# Patient Record
Sex: FEMALE | Race: WHITE | NOT HISPANIC OR LATINO | URBAN - METROPOLITAN AREA
[De-identification: names, ages, dates, MRNs, and addresses within clinical notes are randomized per-mention and may not be internally consistent; named-entity substitution may affect disease eponyms.]

---

## 2017-10-30 ENCOUNTER — EMERGENCY (EMERGENCY)
Facility: HOSPITAL | Age: 27
LOS: 1 days | Discharge: HOME | End: 2017-10-30
Attending: EMERGENCY MEDICINE
Payer: COMMERCIAL

## 2017-10-30 VITALS
SYSTOLIC BLOOD PRESSURE: 126 MMHG | WEIGHT: 179.9 LBS | RESPIRATION RATE: 18 BRPM | HEIGHT: 68 IN | DIASTOLIC BLOOD PRESSURE: 78 MMHG | OXYGEN SATURATION: 99 % | TEMPERATURE: 98 F | HEART RATE: 93 BPM

## 2017-10-30 PROCEDURE — 73590 X-RAY EXAM OF LOWER LEG: CPT | Mod: 26,RT

## 2017-10-30 PROCEDURE — 99283 EMERGENCY DEPT VISIT LOW MDM: CPT

## 2017-10-30 PROCEDURE — 99284 EMERGENCY DEPT VISIT MOD MDM: CPT | Mod: 25

## 2017-10-30 PROCEDURE — 73564 X-RAY EXAM KNEE 4 OR MORE: CPT

## 2017-10-30 PROCEDURE — 73590 X-RAY EXAM OF LOWER LEG: CPT

## 2017-10-30 PROCEDURE — 73564 X-RAY EXAM KNEE 4 OR MORE: CPT | Mod: 26,RT

## 2017-10-30 RX ORDER — IBUPROFEN 200 MG
600 TABLET ORAL ONCE
Qty: 0 | Refills: 0 | Status: COMPLETED | OUTPATIENT
Start: 2017-10-30 | End: 2017-10-30

## 2017-10-30 RX ADMIN — Medication 600 MILLIGRAM(S): at 14:37

## 2017-10-30 NOTE — ED STATDOCS - PROGRESS NOTE DETAILS
Pt is a 28 y/o female c/o of right knee pain for the past week. Agreed with HPI/ROS/PE/Orders from intake  General: Well appearing, in no distress, sitting comfortably Cardio: Regular rate and rhythm, S1/S2, no murmurs Resp: Clear to auscultation b/l, vesicular breath sounds, no wheezes, rales or rhonchi MSK: Tenderness over right knee (medial), crepitus with flexion, FROM in all extremities, able to straight leg raise, neurovascularly intact, muscle strength 5/5, ambulating without difficulty   Follow up with plan from intake X-ray of right knee: No evidence of fracture, MRI suggested for patient to follow up with, will give patient referral for ortho to follow up, pt is explained results and discharge instructions, pt verbalizes understanding results and discharge instructions

## 2017-10-30 NOTE — ED STATDOCS - OBJECTIVE STATEMENT
28 y/o F presents to ED c/o R knee pain x1 week. Pt states the pain has been worsening over the last week and she hears a "cracking" sound. She notes she is no longer able to bear weight on the RLE which prompted her visit to the ED today. She reports she has not seen any provider for her current sx. Denies recent trauma/injury, numbness, tingling, weakness or any other complaints at this time.

## 2017-10-30 NOTE — ED STATDOCS - MUSCULOSKELETAL, MLM
L knee: FROM of the ankle, knee and hip. No TTP of tibia, patella and femur. Full extension of the knee against gravity. No ligamentous laxity. R knee: Medial joint line tenderness. TTP of the proximal tibia. Positive crepitus over the R patellar with flexion. No spinal TTP. L knee: FROM of the ankle, knee and hip. No TTP of tibia, patella and femur. Full extension of the knee against gravity. No ligamentous laxity. R knee: Medial joint line tenderness. TTP of the proximal tibia. No ligamentous laxity. Positive crepitus over the R patellar with flexion. No spinal TTP.

## 2017-10-30 NOTE — ED STATDOCS - ATTENDING CONTRIBUTION TO CARE
I, Diana Raphael, performed the initial face to face bedside interview with this patient regarding history of present illness, review of symptoms and relevant past medical, social and family history.  I completed an independent physical examination.  I was the initial provider who evaluated this patient. I have signed out the follow up of any pending tests (i.e. labs, radiological studies) to the ACP.  I have communicated the patient’s plan of care and disposition with the ACP.  The history, relevant review of systems, past medical and surgical history, medical decision making, and physical examination was documented by the scribe in my presence and I attest to the accuracy of the documentation.

## 2017-10-30 NOTE — ED STATDOCS - CARDIAC, MLM
Heart is regular. No murmurs, rubs or gallops. Heart is regular. No murmurs, rubs or gallops. 2+ DP pulses to RLE

## 2017-10-30 NOTE — ED ADULT TRIAGE NOTE - CHIEF COMPLAINT QUOTE
patient arrived ambulatory to ED, awake, alert, and oriented times 3 ,breathing unlabored.  Patient states " my right knee has been cracking for 1 month"  Patient states today pain and " cracking" have been worse.

## 2017-11-01 ENCOUNTER — APPOINTMENT (OUTPATIENT)
Dept: ORTHOPEDIC SURGERY | Facility: CLINIC | Age: 27
End: 2017-11-01
Payer: MEDICAID

## 2017-11-01 VITALS
DIASTOLIC BLOOD PRESSURE: 84 MMHG | BODY MASS INDEX: 27.28 KG/M2 | WEIGHT: 180 LBS | HEIGHT: 68 IN | SYSTOLIC BLOOD PRESSURE: 129 MMHG | HEART RATE: 120 BPM

## 2017-11-01 DIAGNOSIS — Z78.9 OTHER SPECIFIED HEALTH STATUS: ICD-10-CM

## 2017-11-01 PROCEDURE — 99203 OFFICE O/P NEW LOW 30 MIN: CPT

## 2017-11-04 ENCOUNTER — FORM ENCOUNTER (OUTPATIENT)
Age: 27
End: 2017-11-04

## 2017-11-05 ENCOUNTER — APPOINTMENT (OUTPATIENT)
Dept: MRI IMAGING | Facility: CLINIC | Age: 27
End: 2017-11-05
Payer: MEDICAID

## 2017-11-05 ENCOUNTER — OUTPATIENT (OUTPATIENT)
Dept: OUTPATIENT SERVICES | Facility: HOSPITAL | Age: 27
LOS: 1 days | End: 2017-11-05
Payer: MEDICAID

## 2017-11-05 DIAGNOSIS — M23.91 UNSPECIFIED INTERNAL DERANGEMENT OF RIGHT KNEE: ICD-10-CM

## 2017-11-05 PROCEDURE — 73721 MRI JNT OF LWR EXTRE W/O DYE: CPT

## 2017-11-05 PROCEDURE — 73721 MRI JNT OF LWR EXTRE W/O DYE: CPT | Mod: 26,RT

## 2017-11-09 ENCOUNTER — APPOINTMENT (OUTPATIENT)
Dept: ORTHOPEDIC SURGERY | Facility: CLINIC | Age: 27
End: 2017-11-09
Payer: MEDICAID

## 2017-11-09 VITALS
HEIGHT: 68 IN | DIASTOLIC BLOOD PRESSURE: 71 MMHG | SYSTOLIC BLOOD PRESSURE: 113 MMHG | BODY MASS INDEX: 27.28 KG/M2 | HEART RATE: 84 BPM | WEIGHT: 180 LBS

## 2017-11-09 PROCEDURE — 99213 OFFICE O/P EST LOW 20 MIN: CPT

## 2018-02-01 ENCOUNTER — OUTPATIENT (OUTPATIENT)
Dept: OUTPATIENT SERVICES | Facility: HOSPITAL | Age: 28
LOS: 1 days | End: 2018-02-01
Payer: MEDICAID

## 2018-02-01 PROCEDURE — G9001: CPT

## 2018-02-21 ENCOUNTER — EMERGENCY (EMERGENCY)
Facility: HOSPITAL | Age: 28
LOS: 1 days | Discharge: HOME | End: 2018-02-21
Attending: EMERGENCY MEDICINE | Admitting: EMERGENCY MEDICINE
Payer: COMMERCIAL

## 2018-02-21 VITALS — WEIGHT: 179.9 LBS | HEIGHT: 68 IN

## 2018-02-21 VITALS
HEART RATE: 100 BPM | RESPIRATION RATE: 20 BRPM | DIASTOLIC BLOOD PRESSURE: 66 MMHG | TEMPERATURE: 99 F | SYSTOLIC BLOOD PRESSURE: 132 MMHG | OXYGEN SATURATION: 99 %

## 2018-02-21 PROCEDURE — 99283 EMERGENCY DEPT VISIT LOW MDM: CPT

## 2018-02-21 NOTE — ED STATDOCS - ATTENDING CONTRIBUTION TO CARE
seen with acp  c/o aches pains weakness postive fever  PE is unremarkble  will start on tamiflu  Agree with acps assessment hx and physical

## 2018-02-21 NOTE — ED STATDOCS - PHYSICAL EXAMINATION
PE: General: NAD. Eyes: PERRLA, EOM intact.  CV: RRR, no m/r/g. Lungs: CTA b/l, no use of accessory muscles, no wheezes, rales, rhonchi. Skin: warm, dry, no rashes. Psych: normal mood/affect. Abdomen: Normal BS, soft, NT/ND. . Musculoskeletal: good strength b/l UE/LE, normal Watson.  Neuro: no focal deficits

## 2018-02-21 NOTE — ED STATDOCS - OBJECTIVE STATEMENT
27 y.o F with no PMH on no medications presents to ED complaining of fever, fatigue, muscle aches, cough x 1 day. Pt admits to being around sick contacts, younger sister with positive flu. Pt did not get flu vaccine for this year. Pt denies other medical illnesses.. denies headaches, SOB, chest pain, abdomina pain. Pt able to tolerate PO.

## 2018-02-21 NOTE — ED STATDOCS - MEDICAL DECISION MAKING DETAILS
Given symptoms, recent sick contacts, pt likely with flu. Pt tolerating PO. Will DC home with tamiflu. Supportive tx.

## 2018-02-21 NOTE — ED ADULT NURSE NOTE - OBJECTIVE STATEMENT
pt presents to ED with nasal congestion, body aches, cough and fever since yesterday. pt states she vomited one time. denies diarrhea. a&ox3. breahitng si even and unlabored

## 2018-02-23 DIAGNOSIS — R69 ILLNESS, UNSPECIFIED: ICD-10-CM

## 2018-07-23 ENCOUNTER — EMERGENCY (EMERGENCY)
Facility: HOSPITAL | Age: 28
LOS: 1 days | Discharge: HOME | End: 2018-07-23
Attending: EMERGENCY MEDICINE
Payer: SELF-PAY

## 2018-07-23 VITALS
RESPIRATION RATE: 18 BRPM | DIASTOLIC BLOOD PRESSURE: 79 MMHG | HEIGHT: 67 IN | HEART RATE: 83 BPM | WEIGHT: 179.9 LBS | OXYGEN SATURATION: 99 % | SYSTOLIC BLOOD PRESSURE: 125 MMHG | TEMPERATURE: 99 F

## 2018-07-23 LAB
ALBUMIN SERPL ELPH-MCNC: 3.3 G/DL — SIGNIFICANT CHANGE UP (ref 3.3–5.2)
ALP SERPL-CCNC: 81 U/L — SIGNIFICANT CHANGE UP (ref 40–120)
ALT FLD-CCNC: 21 U/L — SIGNIFICANT CHANGE UP
ANION GAP SERPL CALC-SCNC: 12 MMOL/L — SIGNIFICANT CHANGE UP (ref 5–17)
APPEARANCE UR: CLEAR — SIGNIFICANT CHANGE UP
APTT BLD: 30.3 SEC — SIGNIFICANT CHANGE UP (ref 27.5–37.4)
AST SERPL-CCNC: 16 U/L — SIGNIFICANT CHANGE UP
BACTERIA # UR AUTO: ABNORMAL
BASOPHILS # BLD AUTO: 0 K/UL — SIGNIFICANT CHANGE UP (ref 0–0.2)
BASOPHILS NFR BLD AUTO: 0.2 % — SIGNIFICANT CHANGE UP (ref 0–2)
BILIRUB SERPL-MCNC: 0.3 MG/DL — LOW (ref 0.4–2)
BILIRUB UR-MCNC: NEGATIVE — SIGNIFICANT CHANGE UP
BLD GP AB SCN SERPL QL: SIGNIFICANT CHANGE UP
BUN SERPL-MCNC: 10 MG/DL — SIGNIFICANT CHANGE UP (ref 8–20)
CALCIUM SERPL-MCNC: 9.4 MG/DL — SIGNIFICANT CHANGE UP (ref 8.6–10.2)
CHLORIDE SERPL-SCNC: 104 MMOL/L — SIGNIFICANT CHANGE UP (ref 98–107)
CO2 SERPL-SCNC: 24 MMOL/L — SIGNIFICANT CHANGE UP (ref 22–29)
COLOR SPEC: YELLOW — SIGNIFICANT CHANGE UP
CREAT SERPL-MCNC: 0.25 MG/DL — LOW (ref 0.5–1.3)
DIFF PNL FLD: ABNORMAL
EOSINOPHIL # BLD AUTO: 0.2 K/UL — SIGNIFICANT CHANGE UP (ref 0–0.5)
EOSINOPHIL NFR BLD AUTO: 3.9 % — SIGNIFICANT CHANGE UP (ref 0–6)
EPI CELLS # UR: SIGNIFICANT CHANGE UP
GLUCOSE SERPL-MCNC: 91 MG/DL — SIGNIFICANT CHANGE UP (ref 70–115)
GLUCOSE UR QL: NEGATIVE MG/DL — SIGNIFICANT CHANGE UP
HCG UR QL: NEGATIVE — SIGNIFICANT CHANGE UP
HCT VFR BLD CALC: 35.9 % — LOW (ref 37–47)
HGB BLD-MCNC: 11.7 G/DL — LOW (ref 12–16)
INR BLD: 1.09 RATIO — SIGNIFICANT CHANGE UP (ref 0.88–1.16)
KETONES UR-MCNC: NEGATIVE — SIGNIFICANT CHANGE UP
LEUKOCYTE ESTERASE UR-ACNC: NEGATIVE — SIGNIFICANT CHANGE UP
LYMPHOCYTES # BLD AUTO: 2.6 K/UL — SIGNIFICANT CHANGE UP (ref 1–4.8)
LYMPHOCYTES # BLD AUTO: 45 % — SIGNIFICANT CHANGE UP (ref 20–55)
MCHC RBC-ENTMCNC: 26.7 PG — LOW (ref 27–31)
MCHC RBC-ENTMCNC: 32.6 G/DL — SIGNIFICANT CHANGE UP (ref 32–36)
MCV RBC AUTO: 81.8 FL — SIGNIFICANT CHANGE UP (ref 81–99)
MONOCYTES # BLD AUTO: 0.5 K/UL — SIGNIFICANT CHANGE UP (ref 0–0.8)
MONOCYTES NFR BLD AUTO: 9.5 % — SIGNIFICANT CHANGE UP (ref 3–10)
NEUTROPHILS # BLD AUTO: 2.3 K/UL — SIGNIFICANT CHANGE UP (ref 1.8–8)
NEUTROPHILS NFR BLD AUTO: 41.2 % — SIGNIFICANT CHANGE UP (ref 37–73)
NITRITE UR-MCNC: NEGATIVE — SIGNIFICANT CHANGE UP
PH UR: 5 — SIGNIFICANT CHANGE UP (ref 5–8)
PLATELET # BLD AUTO: 237 K/UL — SIGNIFICANT CHANGE UP (ref 150–400)
POTASSIUM SERPL-MCNC: 4.3 MMOL/L — SIGNIFICANT CHANGE UP (ref 3.5–5.3)
POTASSIUM SERPL-SCNC: 4.3 MMOL/L — SIGNIFICANT CHANGE UP (ref 3.5–5.3)
PROT SERPL-MCNC: 6.6 G/DL — SIGNIFICANT CHANGE UP (ref 6.6–8.7)
PROT UR-MCNC: NEGATIVE MG/DL — SIGNIFICANT CHANGE UP
PROTHROM AB SERPL-ACNC: 12 SEC — SIGNIFICANT CHANGE UP (ref 9.8–12.7)
RBC # BLD: 4.39 M/UL — LOW (ref 4.4–5.2)
RBC # FLD: 14.4 % — SIGNIFICANT CHANGE UP (ref 11–15.6)
RBC CASTS # UR COMP ASSIST: ABNORMAL /HPF (ref 0–4)
SODIUM SERPL-SCNC: 140 MMOL/L — SIGNIFICANT CHANGE UP (ref 135–145)
SP GR SPEC: 1.01 — SIGNIFICANT CHANGE UP (ref 1.01–1.02)
TYPE + AB SCN PNL BLD: SIGNIFICANT CHANGE UP
UROBILINOGEN FLD QL: NEGATIVE MG/DL — SIGNIFICANT CHANGE UP
WBC # BLD: 5.7 K/UL — SIGNIFICANT CHANGE UP (ref 4.8–10.8)
WBC # FLD AUTO: 5.7 K/UL — SIGNIFICANT CHANGE UP (ref 4.8–10.8)
WBC UR QL: SIGNIFICANT CHANGE UP

## 2018-07-23 PROCEDURE — 80053 COMPREHEN METABOLIC PANEL: CPT

## 2018-07-23 PROCEDURE — 76856 US EXAM PELVIC COMPLETE: CPT

## 2018-07-23 PROCEDURE — 84702 CHORIONIC GONADOTROPIN TEST: CPT

## 2018-07-23 PROCEDURE — 76830 TRANSVAGINAL US NON-OB: CPT

## 2018-07-23 PROCEDURE — 85027 COMPLETE CBC AUTOMATED: CPT

## 2018-07-23 PROCEDURE — 85610 PROTHROMBIN TIME: CPT

## 2018-07-23 PROCEDURE — 76830 TRANSVAGINAL US NON-OB: CPT | Mod: 26

## 2018-07-23 PROCEDURE — 86850 RBC ANTIBODY SCREEN: CPT

## 2018-07-23 PROCEDURE — 81001 URINALYSIS AUTO W/SCOPE: CPT

## 2018-07-23 PROCEDURE — 99284 EMERGENCY DEPT VISIT MOD MDM: CPT

## 2018-07-23 PROCEDURE — 36415 COLL VENOUS BLD VENIPUNCTURE: CPT

## 2018-07-23 PROCEDURE — 86900 BLOOD TYPING SEROLOGIC ABO: CPT

## 2018-07-23 PROCEDURE — 85730 THROMBOPLASTIN TIME PARTIAL: CPT

## 2018-07-23 PROCEDURE — 87086 URINE CULTURE/COLONY COUNT: CPT

## 2018-07-23 PROCEDURE — 86901 BLOOD TYPING SEROLOGIC RH(D): CPT

## 2018-07-23 PROCEDURE — 81025 URINE PREGNANCY TEST: CPT

## 2018-07-23 PROCEDURE — 76856 US EXAM PELVIC COMPLETE: CPT | Mod: 26

## 2018-07-23 PROCEDURE — 99284 EMERGENCY DEPT VISIT MOD MDM: CPT | Mod: 25

## 2018-07-23 NOTE — ED STATDOCS - OBJECTIVE STATEMENT
This is a 27 year old female with c/o vaginal bleeding during pregnancy x 1 day.  She notes h/o 4 times being pregnant.  1 Living, 2 miscarriages medically done, and 1 .  She notes took home pregnancy test friday and was positive.  She notes bleeding intensified today.  She notes has not had any prenatal care thus far, due to lack of insurance, in past used [facility].

## 2018-07-23 NOTE — ED STATDOCS - CARE PLAN
Principal Discharge DX:	Threatened  Principal Discharge DX:	DUB (dysfunctional uterine bleeding) Principal Discharge DX:	DUB (dysfunctional uterine bleeding)  Secondary Diagnosis:	Ovarian cyst

## 2018-07-23 NOTE — ED ADULT TRIAGE NOTE - CHIEF COMPLAINT QUOTE
"I took a pregnancy test and it was positive now I am bleeding and I have lower abdominal cramping. " Pt states LMP was 6/14/2018

## 2018-07-23 NOTE — ED STATDOCS - PROGRESS NOTE DETAILS
labs and UA reviewed, patient is not pregnant, pending US, patient reports h/o ovarian cyst patient wanted to leave prior to obtaining US results, US shows cyst, needs f/u with GYN, LM with home phone number labs and UA reviewed, patient is not pregnant, pending US, patient reports h/o ovarian cyst, patient deferred pelvic exam spoke to patient given results of US to f/u with GYN, patient understands and agrees to proceed

## 2018-07-23 NOTE — ED ADULT NURSE NOTE - OBJECTIVE STATEMENT
Patient arrived to ED with c/o vaginal bleeding, cramping to lower abdomen.  Patient states she is about 1 month pregnant.

## 2018-07-23 NOTE — ED STATDOCS - ATTENDING CONTRIBUTION TO CARE
case d/w acp:  postive hpt with light vaginal bleeding since yesterday which has increased in intensity.  mild lower abd cramps reported.  nontoxic appaeirn,with no abd tendnerss reported on exam.  labs and sono resutls reviewed; suggested of dysfunctional uterine bleeding.

## 2018-07-24 LAB
CULTURE RESULTS: NO GROWTH — SIGNIFICANT CHANGE UP
SPECIMEN SOURCE: SIGNIFICANT CHANGE UP

## 2018-09-01 ENCOUNTER — OUTPATIENT (OUTPATIENT)
Dept: OUTPATIENT SERVICES | Facility: HOSPITAL | Age: 28
LOS: 1 days | End: 2018-09-01
Payer: MEDICAID

## 2018-09-01 PROCEDURE — G9001: CPT

## 2018-09-09 ENCOUNTER — EMERGENCY (EMERGENCY)
Facility: HOSPITAL | Age: 28
LOS: 1 days | Discharge: HOME | End: 2018-09-09
Attending: EMERGENCY MEDICINE
Payer: MEDICAID

## 2018-09-09 VITALS — HEIGHT: 68 IN | WEIGHT: 190.04 LBS

## 2018-09-09 VITALS — HEART RATE: 103 BPM | TEMPERATURE: 100 F | OXYGEN SATURATION: 99 % | RESPIRATION RATE: 18 BRPM

## 2018-09-09 LAB
APPEARANCE UR: CLEAR — SIGNIFICANT CHANGE UP
BILIRUB UR-MCNC: NEGATIVE — SIGNIFICANT CHANGE UP
COLOR SPEC: YELLOW — SIGNIFICANT CHANGE UP
DIFF PNL FLD: ABNORMAL
EPI CELLS # UR: SIGNIFICANT CHANGE UP
GLUCOSE UR QL: NEGATIVE MG/DL — SIGNIFICANT CHANGE UP
HCG UR QL: NEGATIVE — SIGNIFICANT CHANGE UP
KETONES UR-MCNC: NEGATIVE — SIGNIFICANT CHANGE UP
LEUKOCYTE ESTERASE UR-ACNC: ABNORMAL
NITRITE UR-MCNC: NEGATIVE — SIGNIFICANT CHANGE UP
PH UR: 6 — SIGNIFICANT CHANGE UP (ref 5–8)
PROT UR-MCNC: 30 MG/DL
RBC CASTS # UR COMP ASSIST: SIGNIFICANT CHANGE UP /HPF (ref 0–4)
SP GR SPEC: 1.02 — SIGNIFICANT CHANGE UP (ref 1.01–1.02)
UROBILINOGEN FLD QL: 1 MG/DL
WBC UR QL: SIGNIFICANT CHANGE UP

## 2018-09-09 PROCEDURE — 81025 URINE PREGNANCY TEST: CPT

## 2018-09-09 PROCEDURE — 99283 EMERGENCY DEPT VISIT LOW MDM: CPT | Mod: 25

## 2018-09-09 PROCEDURE — 96372 THER/PROPH/DIAG INJ SC/IM: CPT

## 2018-09-09 PROCEDURE — 99283 EMERGENCY DEPT VISIT LOW MDM: CPT

## 2018-09-09 PROCEDURE — 87070 CULTURE OTHR SPECIMN AEROBIC: CPT

## 2018-09-09 PROCEDURE — 81001 URINALYSIS AUTO W/SCOPE: CPT

## 2018-09-09 RX ORDER — AMOXICILLIN 250 MG/5ML
1 SUSPENSION, RECONSTITUTED, ORAL (ML) ORAL
Qty: 30 | Refills: 0 | OUTPATIENT
Start: 2018-09-09 | End: 2018-09-18

## 2018-09-09 RX ORDER — KETOROLAC TROMETHAMINE 30 MG/ML
60 SYRINGE (ML) INJECTION ONCE
Qty: 0 | Refills: 0 | Status: DISCONTINUED | OUTPATIENT
Start: 2018-09-09 | End: 2018-09-09

## 2018-09-09 RX ORDER — AMOXICILLIN 250 MG/5ML
500 SUSPENSION, RECONSTITUTED, ORAL (ML) ORAL ONCE
Qty: 0 | Refills: 0 | Status: COMPLETED | OUTPATIENT
Start: 2018-09-09 | End: 2018-09-09

## 2018-09-09 RX ORDER — ACETAMINOPHEN 500 MG
650 TABLET ORAL ONCE
Qty: 0 | Refills: 0 | Status: COMPLETED | OUTPATIENT
Start: 2018-09-09 | End: 2018-09-09

## 2018-09-09 RX ADMIN — Medication 650 MILLIGRAM(S): at 18:01

## 2018-09-09 RX ADMIN — Medication 500 MILLIGRAM(S): at 20:18

## 2018-09-09 RX ADMIN — Medication 60 MILLIGRAM(S): at 20:18

## 2018-09-09 NOTE — ED STATDOCS - OBJECTIVE STATEMENT
This patient is a 29 y/o F presenting to the ED with fever (105), congestion, body aches, and sore throat which onset 3 days ago. Pt notes her son was also sick; he had a fever and abdominal pain. Pt has been taking alternating Tylenol and Motrin as her son did but has had minimal relief. She took Ampicillin today at 1600 but says she was not prescribed it. Pt also says she has pain with swallowing at times. She traveled to [place] recently but does not recall having any tick bites. She is a smoker and denies cough or ear pain. This patient is a 29 y/o woman presenting to the ED with fever (105), congestion, body aches, and sore throat which onset 3 days ago. Pt notes her son was also sick with fever and abdominal pain. Pt has been taking alternating Tylenol and Motrin with minimal relief. She took Ampicillin today at 1600 but it was a left over pill from another family members prescription.  She denies recent travel, tick bites, abdominal pain, vomiting, dysuria, and hematuria.

## 2018-09-09 NOTE — ED ADULT NURSE NOTE - NSIMPLEMENTINTERV_GEN_ALL_ED
Implemented All Universal Safety Interventions:  [place] to call system. Call bell, personal items and telephone within reach. Instruct patient to call for assistance. Room bathroom lighting operational. Non-slip footwear when patient is off stretcher. Physically safe environment: no spills, clutter or unnecessary equipment. Stretcher in lowest position, wheels locked, appropriate side rails in place.

## 2018-09-09 NOTE — ED ADULT TRIAGE NOTE - CHIEF COMPLAINT QUOTE
Patient is awake and oriented times 3, complains of a fever and sore throat, patient states that her son has similar symptoms

## 2018-09-09 NOTE — ED STATDOCS - MEDICAL DECISION MAKING DETAILS
27 y/o F presents with sore throat and nasal congestion - erythematous tonsils on exam - reporting high temp: Will treat for strep pharyngitis. Pt encouraged to stop smoking and stay hydrate. Will instruct pt to take Motrin & Tylenol for pain and f/u with PMD.

## 2018-09-09 NOTE — ED STATDOCS - ENMT, MLM
Effusion to right ear, erythematous. Swollen and erythematous tonsils, no exudates. Post nasal drip on exam

## 2018-09-11 ENCOUNTER — INPATIENT (INPATIENT)
Facility: HOSPITAL | Age: 28
LOS: 1 days | Discharge: HOME | DRG: 75 | End: 2018-09-13
Attending: HOSPITALIST | Admitting: HOSPITALIST
Payer: MEDICAID

## 2018-09-11 VITALS — HEIGHT: 68 IN | WEIGHT: 190.04 LBS

## 2018-09-11 LAB
ALBUMIN SERPL ELPH-MCNC: 3.4 G/DL — SIGNIFICANT CHANGE UP (ref 3.3–5.2)
ALP SERPL-CCNC: 96 U/L — SIGNIFICANT CHANGE UP (ref 40–120)
ALT FLD-CCNC: 32 U/L — SIGNIFICANT CHANGE UP
AMPHET UR-MCNC: NEGATIVE — SIGNIFICANT CHANGE UP
ANION GAP SERPL CALC-SCNC: 14 MMOL/L — SIGNIFICANT CHANGE UP (ref 5–17)
APPEARANCE CSF: CLEAR — SIGNIFICANT CHANGE UP
APPEARANCE UR: CLEAR — SIGNIFICANT CHANGE UP
APTT BLD: 30.5 SEC — SIGNIFICANT CHANGE UP (ref 27.5–37.4)
AST SERPL-CCNC: 33 U/L — HIGH
BACTERIA # UR AUTO: ABNORMAL
BARBITURATES UR SCN-MCNC: NEGATIVE — SIGNIFICANT CHANGE UP
BENZODIAZ UR-MCNC: NEGATIVE — SIGNIFICANT CHANGE UP
BILIRUB SERPL-MCNC: 0.2 MG/DL — LOW (ref 0.4–2)
BILIRUB UR-MCNC: NEGATIVE — SIGNIFICANT CHANGE UP
BLD GP AB SCN SERPL QL: SIGNIFICANT CHANGE UP
BUN SERPL-MCNC: 9 MG/DL — SIGNIFICANT CHANGE UP (ref 8–20)
CALCIUM SERPL-MCNC: 9 MG/DL — SIGNIFICANT CHANGE UP (ref 8.6–10.2)
CHLORIDE SERPL-SCNC: 103 MMOL/L — SIGNIFICANT CHANGE UP (ref 98–107)
CO2 SERPL-SCNC: 21 MMOL/L — LOW (ref 22–29)
COCAINE METAB.OTHER UR-MCNC: NEGATIVE — SIGNIFICANT CHANGE UP
COLOR CSF: SIGNIFICANT CHANGE UP
COLOR SPEC: YELLOW — SIGNIFICANT CHANGE UP
CREAT SERPL-MCNC: 0.28 MG/DL — LOW (ref 0.5–1.3)
CULTURE RESULTS: SIGNIFICANT CHANGE UP
DIFF PNL FLD: ABNORMAL
EOSINOPHIL # BLD AUTO: 0 K/UL — SIGNIFICANT CHANGE UP (ref 0–0.5)
EOSINOPHIL NFR BLD AUTO: 0.1 % — SIGNIFICANT CHANGE UP (ref 0–6)
EPI CELLS # UR: SIGNIFICANT CHANGE UP
GLUCOSE CSF-MCNC: 52 MG/DL — SIGNIFICANT CHANGE UP (ref 40–70)
GLUCOSE SERPL-MCNC: 93 MG/DL — SIGNIFICANT CHANGE UP (ref 70–115)
GLUCOSE UR QL: NEGATIVE MG/DL — SIGNIFICANT CHANGE UP
GRAM STN FLD: SIGNIFICANT CHANGE UP
HCG UR QL: NEGATIVE — SIGNIFICANT CHANGE UP
HCT VFR BLD CALC: 36.6 % — LOW (ref 37–47)
HETEROPH AB TITR SER AGGL: NEGATIVE — SIGNIFICANT CHANGE UP
HGB BLD-MCNC: 12.2 G/DL — SIGNIFICANT CHANGE UP (ref 12–16)
INR BLD: 1.22 RATIO — HIGH (ref 0.88–1.16)
KETONES UR-MCNC: ABNORMAL
LEUKOCYTE ESTERASE UR-ACNC: NEGATIVE — SIGNIFICANT CHANGE UP
LYMPHOCYTES # BLD AUTO: 1.6 K/UL — SIGNIFICANT CHANGE UP (ref 1–4.8)
LYMPHOCYTES # BLD AUTO: 18.5 % — LOW (ref 20–55)
LYMPHOCYTES # CSF: 6 % — LOW (ref 40–80)
MCHC RBC-ENTMCNC: 26.8 PG — LOW (ref 27–31)
MCHC RBC-ENTMCNC: 33.3 G/DL — SIGNIFICANT CHANGE UP (ref 32–36)
MCV RBC AUTO: 80.3 FL — LOW (ref 81–99)
METHADONE UR-MCNC: NEGATIVE — SIGNIFICANT CHANGE UP
MONOCYTES # BLD AUTO: 0.4 K/UL — SIGNIFICANT CHANGE UP (ref 0–0.8)
MONOCYTES NFR BLD AUTO: 4.3 % — SIGNIFICANT CHANGE UP (ref 3–10)
MONOS+MACROS NFR CSF: 4 % — SIGNIFICANT CHANGE UP
NEUTROPHILS # BLD AUTO: 6.6 K/UL — SIGNIFICANT CHANGE UP (ref 1.8–8)
NEUTROPHILS # CSF: 90 % — SIGNIFICANT CHANGE UP
NEUTROPHILS NFR BLD AUTO: 77 % — HIGH (ref 37–73)
NITRITE UR-MCNC: NEGATIVE — SIGNIFICANT CHANGE UP
NRBC NFR CSF: 484 /UL — HIGH (ref 0–5)
OPIATES UR-MCNC: NEGATIVE — SIGNIFICANT CHANGE UP
PCP SPEC-MCNC: SIGNIFICANT CHANGE UP
PCP UR-MCNC: NEGATIVE — SIGNIFICANT CHANGE UP
PH UR: 5 — SIGNIFICANT CHANGE UP (ref 5–8)
PLATELET # BLD AUTO: 204 K/UL — SIGNIFICANT CHANGE UP (ref 150–400)
POTASSIUM SERPL-MCNC: 4 MMOL/L — SIGNIFICANT CHANGE UP (ref 3.5–5.3)
POTASSIUM SERPL-SCNC: 4 MMOL/L — SIGNIFICANT CHANGE UP (ref 3.5–5.3)
PROT CSF-MCNC: 30 MG/DL — SIGNIFICANT CHANGE UP (ref 15–45)
PROT SERPL-MCNC: 7 G/DL — SIGNIFICANT CHANGE UP (ref 6.6–8.7)
PROT UR-MCNC: NEGATIVE MG/DL — SIGNIFICANT CHANGE UP
PROTHROM AB SERPL-ACNC: 13.5 SEC — HIGH (ref 9.8–12.7)
RBC # BLD: 4.56 M/UL — SIGNIFICANT CHANGE UP (ref 4.4–5.2)
RBC # CSF: 4 /CMM — HIGH (ref 0–1)
RBC # FLD: 12.7 % — SIGNIFICANT CHANGE UP (ref 11–15.6)
RBC CASTS # UR COMP ASSIST: SIGNIFICANT CHANGE UP /HPF (ref 0–4)
SODIUM SERPL-SCNC: 138 MMOL/L — SIGNIFICANT CHANGE UP (ref 135–145)
SP GR SPEC: 1.01 — SIGNIFICANT CHANGE UP (ref 1.01–1.02)
SPECIMEN SOURCE: SIGNIFICANT CHANGE UP
SPECIMEN SOURCE: SIGNIFICANT CHANGE UP
THC UR QL: POSITIVE
TUBE TYPE: SIGNIFICANT CHANGE UP
TYPE + AB SCN PNL BLD: SIGNIFICANT CHANGE UP
UROBILINOGEN FLD QL: NEGATIVE MG/DL — SIGNIFICANT CHANGE UP
WBC # BLD: 8.5 K/UL — SIGNIFICANT CHANGE UP (ref 4.8–10.8)
WBC # FLD AUTO: 8.5 K/UL — SIGNIFICANT CHANGE UP (ref 4.8–10.8)
WBC UR QL: SIGNIFICANT CHANGE UP

## 2018-09-11 PROCEDURE — 99285 EMERGENCY DEPT VISIT HI MDM: CPT | Mod: 25

## 2018-09-11 PROCEDURE — 62270 DX LMBR SPI PNXR: CPT

## 2018-09-11 PROCEDURE — 71045 X-RAY EXAM CHEST 1 VIEW: CPT | Mod: 26

## 2018-09-11 PROCEDURE — 70450 CT HEAD/BRAIN W/O DYE: CPT | Mod: 26

## 2018-09-11 PROCEDURE — 74177 CT ABD & PELVIS W/CONTRAST: CPT | Mod: 26

## 2018-09-11 RX ORDER — ACYCLOVIR SODIUM 500 MG
850 VIAL (EA) INTRAVENOUS EVERY 8 HOURS
Qty: 0 | Refills: 0 | Status: DISCONTINUED | OUTPATIENT
Start: 2018-09-11 | End: 2018-09-12

## 2018-09-11 RX ORDER — KETOROLAC TROMETHAMINE 30 MG/ML
30 SYRINGE (ML) INJECTION ONCE
Qty: 0 | Refills: 0 | Status: DISCONTINUED | OUTPATIENT
Start: 2018-09-11 | End: 2018-09-11

## 2018-09-11 RX ORDER — ACETAMINOPHEN 500 MG
1000 TABLET ORAL ONCE
Qty: 0 | Refills: 0 | Status: COMPLETED | OUTPATIENT
Start: 2018-09-11 | End: 2018-09-11

## 2018-09-11 RX ORDER — METOCLOPRAMIDE HCL 10 MG
10 TABLET ORAL ONCE
Qty: 0 | Refills: 0 | Status: COMPLETED | OUTPATIENT
Start: 2018-09-11 | End: 2018-09-11

## 2018-09-11 RX ORDER — SODIUM CHLORIDE 9 MG/ML
1000 INJECTION INTRAMUSCULAR; INTRAVENOUS; SUBCUTANEOUS ONCE
Qty: 0 | Refills: 0 | Status: COMPLETED | OUTPATIENT
Start: 2018-09-11 | End: 2018-09-11

## 2018-09-11 RX ADMIN — Medication 1000 MILLIGRAM(S): at 16:55

## 2018-09-11 RX ADMIN — Medication 10 MILLIGRAM(S): at 16:27

## 2018-09-11 RX ADMIN — SODIUM CHLORIDE 1000 MILLILITER(S): 9 INJECTION INTRAMUSCULAR; INTRAVENOUS; SUBCUTANEOUS at 16:27

## 2018-09-11 RX ADMIN — Medication 2 TABLET(S): at 22:23

## 2018-09-11 RX ADMIN — Medication 10 MILLIGRAM(S): at 14:49

## 2018-09-11 RX ADMIN — Medication 267 MILLIGRAM(S): at 23:43

## 2018-09-11 RX ADMIN — SODIUM CHLORIDE 1000 MILLILITER(S): 9 INJECTION INTRAMUSCULAR; INTRAVENOUS; SUBCUTANEOUS at 23:36

## 2018-09-11 RX ADMIN — Medication 2 TABLET(S): at 23:16

## 2018-09-11 RX ADMIN — SODIUM CHLORIDE 1000 MILLILITER(S): 9 INJECTION INTRAMUSCULAR; INTRAVENOUS; SUBCUTANEOUS at 22:23

## 2018-09-11 RX ADMIN — SODIUM CHLORIDE 1000 MILLILITER(S): 9 INJECTION INTRAMUSCULAR; INTRAVENOUS; SUBCUTANEOUS at 14:49

## 2018-09-11 RX ADMIN — Medication 30 MILLIGRAM(S): at 23:40

## 2018-09-11 RX ADMIN — Medication 400 MILLIGRAM(S): at 16:38

## 2018-09-11 NOTE — ED ADULT TRIAGE NOTE - CHIEF COMPLAINT QUOTE
"I have rash all over my body and I have a headache, I was diagnosed with pharyngitis. " Pt A & Ox. 4

## 2018-09-11 NOTE — ED STATDOCS - OBJECTIVE STATEMENT
Patient is a 28 year old F with a PMHx of anxiety and depression who presents to the ED complaining of headache x4-5 days and rash that started today.  States that she is having sensitivity to light and notes that the rash is on the chest and stomach.  Notes she has had an on/off fever for the past few days.  Denies any recent travel, or other medical complaints at this time.

## 2018-09-11 NOTE — ED STATDOCS - ATTENDING CONTRIBUTION TO CARE
I, Antolin Becker, performed the initial face to face bedside interview with this patient regarding history of present illness, review of symptoms and relevant past medical, social and family history.  I completed an independent physical examination.  I was the initial provider who evaluated this patient. I have signed out the follow up of any pending tests (i.e. labs, radiological studies) to the ACP.  I have communicated the patient’s plan of care and disposition with the ACP.

## 2018-09-11 NOTE — ED STATDOCS - CARE PLAN
Principal Discharge DX:	Headache  Secondary Diagnosis:	Rash  Secondary Diagnosis:	Fever Principal Discharge DX:	Viral meningitis  Secondary Diagnosis:	Rash  Secondary Diagnosis:	Fever

## 2018-09-12 DIAGNOSIS — Z71.89 OTHER SPECIFIED COUNSELING: ICD-10-CM

## 2018-09-12 DIAGNOSIS — A87.9 VIRAL MENINGITIS, UNSPECIFIED: ICD-10-CM

## 2018-09-12 LAB
ANION GAP SERPL CALC-SCNC: 11 MMOL/L — SIGNIFICANT CHANGE UP (ref 5–17)
ANISOCYTOSIS BLD QL: SLIGHT — SIGNIFICANT CHANGE UP
BASOPHILS # BLD AUTO: 0 K/UL — SIGNIFICANT CHANGE UP (ref 0–0.2)
BUN SERPL-MCNC: 8 MG/DL — SIGNIFICANT CHANGE UP (ref 8–20)
CALCIUM SERPL-MCNC: 8.6 MG/DL — SIGNIFICANT CHANGE UP (ref 8.6–10.2)
CHLORIDE SERPL-SCNC: 107 MMOL/L — SIGNIFICANT CHANGE UP (ref 98–107)
CO2 SERPL-SCNC: 21 MMOL/L — LOW (ref 22–29)
CREAT SERPL-MCNC: 0.23 MG/DL — LOW (ref 0.5–1.3)
CSF PCR RESULT: DETECTED
CULTURE RESULTS: NO GROWTH — SIGNIFICANT CHANGE UP
EOSINOPHIL # BLD AUTO: 0 K/UL — SIGNIFICANT CHANGE UP (ref 0–0.5)
EOSINOPHIL NFR BLD AUTO: 1 % — SIGNIFICANT CHANGE UP (ref 0–5)
EV RNA CSF QL NAA+PROBE: DETECTED
GLUCOSE SERPL-MCNC: 86 MG/DL — SIGNIFICANT CHANGE UP (ref 70–115)
HCT VFR BLD CALC: 32.1 % — LOW (ref 37–47)
HGB BLD-MCNC: 10.5 G/DL — LOW (ref 12–16)
HIV 1 & 2 AB SERPL IA.RAPID: SIGNIFICANT CHANGE UP
LACTATE BLDV-MCNC: 0.7 MMOL/L — SIGNIFICANT CHANGE UP (ref 0.5–2)
LYMPHOCYTES # BLD AUTO: 3 K/UL — SIGNIFICANT CHANGE UP (ref 1–4.8)
LYMPHOCYTES # BLD AUTO: 57 % — HIGH (ref 20–55)
MAGNESIUM SERPL-MCNC: 2 MG/DL — SIGNIFICANT CHANGE UP (ref 1.6–2.6)
MCHC RBC-ENTMCNC: 26.4 PG — LOW (ref 27–31)
MCHC RBC-ENTMCNC: 32.7 G/DL — SIGNIFICANT CHANGE UP (ref 32–36)
MCV RBC AUTO: 80.7 FL — LOW (ref 81–99)
MICROCYTES BLD QL: SLIGHT — SIGNIFICANT CHANGE UP
MONOCYTES # BLD AUTO: 0.5 K/UL — SIGNIFICANT CHANGE UP (ref 0–0.8)
MONOCYTES NFR BLD AUTO: 10 % — SIGNIFICANT CHANGE UP (ref 3–10)
NEUTROPHILS # BLD AUTO: 1.5 K/UL — LOW (ref 1.8–8)
NEUTROPHILS NFR BLD AUTO: 30 % — LOW (ref 37–73)
NEUTS BAND # BLD: 1 % — SIGNIFICANT CHANGE UP (ref 0–8)
PHOSPHATE SERPL-MCNC: 4.2 MG/DL — SIGNIFICANT CHANGE UP (ref 2.4–4.7)
PLAT MORPH BLD: NORMAL — SIGNIFICANT CHANGE UP
PLATELET # BLD AUTO: 151 K/UL — SIGNIFICANT CHANGE UP (ref 150–400)
POIKILOCYTOSIS BLD QL AUTO: SLIGHT — SIGNIFICANT CHANGE UP
POLYCHROMASIA BLD QL SMEAR: SLIGHT — SIGNIFICANT CHANGE UP
POTASSIUM SERPL-MCNC: 3.7 MMOL/L — SIGNIFICANT CHANGE UP (ref 3.5–5.3)
POTASSIUM SERPL-SCNC: 3.7 MMOL/L — SIGNIFICANT CHANGE UP (ref 3.5–5.3)
RBC # BLD: 3.98 M/UL — LOW (ref 4.4–5.2)
RBC # FLD: 12.6 % — SIGNIFICANT CHANGE UP (ref 11–15.6)
RBC BLD AUTO: ABNORMAL
SODIUM SERPL-SCNC: 139 MMOL/L — SIGNIFICANT CHANGE UP (ref 135–145)
SPECIMEN SOURCE: SIGNIFICANT CHANGE UP
T3 SERPL-MCNC: 238 NG/DL — HIGH (ref 80–200)
T4 AB SER-ACNC: 12.3 UG/DL — HIGH (ref 4.5–12)
T4 AB SER-ACNC: 12.6 UG/DL — HIGH (ref 4.5–12)
TSH SERPL-MCNC: <0.1 UIU/ML — LOW (ref 0.27–4.2)
TSH SERPL-MCNC: <0.1 UIU/ML — LOW (ref 0.27–4.2)
VARIANT LYMPHS # BLD: 1 % — SIGNIFICANT CHANGE UP (ref 0–6)
WBC # BLD: 5 K/UL — SIGNIFICANT CHANGE UP (ref 4.8–10.8)
WBC # FLD AUTO: 5 K/UL — SIGNIFICANT CHANGE UP (ref 4.8–10.8)

## 2018-09-12 PROCEDURE — 99223 1ST HOSP IP/OBS HIGH 75: CPT

## 2018-09-12 PROCEDURE — 76536 US EXAM OF HEAD AND NECK: CPT | Mod: 26

## 2018-09-12 PROCEDURE — 12345: CPT | Mod: NC,GC

## 2018-09-12 RX ORDER — KETOROLAC TROMETHAMINE 30 MG/ML
30 SYRINGE (ML) INJECTION EVERY 8 HOURS
Qty: 0 | Refills: 0 | Status: DISCONTINUED | OUTPATIENT
Start: 2018-09-12 | End: 2018-09-13

## 2018-09-12 RX ORDER — CEFTRIAXONE 500 MG/1
2 INJECTION, POWDER, FOR SOLUTION INTRAMUSCULAR; INTRAVENOUS EVERY 12 HOURS
Qty: 0 | Refills: 0 | Status: DISCONTINUED | OUTPATIENT
Start: 2018-09-12 | End: 2018-09-12

## 2018-09-12 RX ORDER — ACETAMINOPHEN 500 MG
650 TABLET ORAL EVERY 6 HOURS
Qty: 0 | Refills: 0 | Status: DISCONTINUED | OUTPATIENT
Start: 2018-09-12 | End: 2018-09-13

## 2018-09-12 RX ORDER — SODIUM CHLORIDE 9 MG/ML
1000 INJECTION, SOLUTION INTRAVENOUS
Qty: 0 | Refills: 0 | Status: DISCONTINUED | OUTPATIENT
Start: 2018-09-12 | End: 2018-09-13

## 2018-09-12 RX ORDER — ACETAMINOPHEN 500 MG
650 TABLET ORAL EVERY 6 HOURS
Qty: 0 | Refills: 0 | Status: DISCONTINUED | OUTPATIENT
Start: 2018-09-12 | End: 2018-09-12

## 2018-09-12 RX ORDER — CEFTRIAXONE 500 MG/1
2 INJECTION, POWDER, FOR SOLUTION INTRAMUSCULAR; INTRAVENOUS ONCE
Qty: 0 | Refills: 0 | Status: COMPLETED | OUTPATIENT
Start: 2018-09-12 | End: 2018-09-12

## 2018-09-12 RX ORDER — ONDANSETRON 8 MG/1
4 TABLET, FILM COATED ORAL EVERY 8 HOURS
Qty: 0 | Refills: 0 | Status: DISCONTINUED | OUTPATIENT
Start: 2018-09-12 | End: 2018-09-13

## 2018-09-12 RX ORDER — CEFTRIAXONE 500 MG/1
INJECTION, POWDER, FOR SOLUTION INTRAMUSCULAR; INTRAVENOUS
Qty: 0 | Refills: 0 | Status: DISCONTINUED | OUTPATIENT
Start: 2018-09-12 | End: 2018-09-12

## 2018-09-12 RX ORDER — SACCHAROMYCES BOULARDII 250 MG
250 POWDER IN PACKET (EA) ORAL
Qty: 0 | Refills: 0 | Status: DISCONTINUED | OUTPATIENT
Start: 2018-09-12 | End: 2018-09-13

## 2018-09-12 RX ADMIN — CEFTRIAXONE 100 GRAM(S): 500 INJECTION, POWDER, FOR SOLUTION INTRAMUSCULAR; INTRAVENOUS at 02:23

## 2018-09-12 RX ADMIN — Medication 500 MILLIGRAM(S): at 04:25

## 2018-09-12 RX ADMIN — SODIUM CHLORIDE 125 MILLILITER(S): 9 INJECTION, SOLUTION INTRAVENOUS at 08:37

## 2018-09-12 RX ADMIN — Medication 30 MILLIGRAM(S): at 00:00

## 2018-09-12 RX ADMIN — Medication 500 MILLIGRAM(S): at 21:01

## 2018-09-12 RX ADMIN — Medication 650 MILLIGRAM(S): at 03:35

## 2018-09-12 RX ADMIN — SODIUM CHLORIDE 125 MILLILITER(S): 9 INJECTION, SOLUTION INTRAVENOUS at 02:24

## 2018-09-12 RX ADMIN — Medication 250 MILLIGRAM(S): at 06:21

## 2018-09-12 RX ADMIN — Medication 250 MILLIGRAM(S): at 20:16

## 2018-09-12 RX ADMIN — Medication 650 MILLIGRAM(S): at 21:01

## 2018-09-12 RX ADMIN — Medication 650 MILLIGRAM(S): at 04:25

## 2018-09-12 RX ADMIN — Medication 500 MILLIGRAM(S): at 03:35

## 2018-09-12 NOTE — H&P ADULT - NSHPSOCIALHISTORY_GEN_ALL_CORE
Current everyday smoker, 1ppd x 3 days.  Occasional alcohol use.   Occasional marijuana use.   Works at a [employer].

## 2018-09-12 NOTE — H&P ADULT - HISTORY OF PRESENT ILLNESS
27 y/o female with hx of anxiety and depression (currently stable and off meds), presents with headaches and fevers since friday. Pt's son was recently diagnosed with viral pharyngitis, and shortly after that pt developed sore throat, cough with brown phlegm, and fevers (max temp at home 105 F). Assoc with severe retroorbital headaches, photophobia and neck stiffness that worsened over the past couple of days. She has been taking alternating tylenol and motrin with some relief noted. No recent travel or tick bites. She also reports mild nausea and diarrhea with 6-8 watery BMs today ([place] type 7). Pt was given amoxicillin 2 days ago for pharyngitis, and last night she developed a mild non-pruritic macular rash on her upper torso, that has since resolved. She states that she has taken amoxicillin on several occasions in the past without any adverse reactions.

## 2018-09-12 NOTE — CONSULT NOTE ADULT - SUBJECTIVE AND OBJECTIVE BOX
[facility] ROGER GUAMAN MD FACP   GERARDO ATWOOD MD  Diplomates American Board of Internal Medicine and Infecctious Diseases  [phone]  [id number] JOEL [id number]      HPI:  29 y/o female with hx of anxiety and depression (currently stable and off meds), presents with headaches and fevers since friday. Pt's son was recently diagnosed with viral pharyngitis, and shortly after that pt developed sore throat, cough with brown phlegm, and fevers (max temp at home 105 F). Assoc with severe retroorbital headaches, photophobia and neck stiffness that worsened over the past couple of days. She has been taking alternating tylenol and motrin with some relief noted. No recent travel or tick bites. She also reports mild nausea and diarrhea with 6-8 watery BMs today ([place] type 7). Pt was given amoxicillin 2 days ago for pharyngitis,   developed a mild non-pruritic macular rash on her upper torso, that has since resolved.   PT WITH LUMBAR PUNCTURE DONE  CSF PCR WITH ENTEROVIRUS  PT FEELING BETTER THIS AM  ASKED TO EVALUATE FROM ID STANDPOINT       PAST MEDICAL & SURGICAL HISTORY:  Depressed  Anxiety  No significant past surgical history      ANTIBIOTICS       Allergies    No Known Allergies    Intolerances        SOCIAL HISTORY:       FAMILY HX   FAMILY HISTORY:  No pertinent family history in first degree relatives      Vital Signs Last 24 Hrs  T(C): 36.7 (12 Sep 2018 08:26), Max: 38.7 (11 Sep 2018 22:37)  T(F): 98.1 (12 Sep 2018 08:26), Max: 101.6 (11 Sep 2018 22:37)  HR: 88 (12 Sep 2018 08:01) (76 - 98)  BP: 133/62 (12 Sep 2018 08:01) (122/71 - 135/73)  BP(mean): --  RR: 16 (12 Sep 2018 08:01) (16 - 22)  SpO2: 98% (12 Sep 2018 08:01) (97% - 100%)  Drug Dosing Weight  Height (cm): 172.72 (11 Sep 2018 12:52)  Weight (kg): 86.2 (11 Sep 2018 12:52)  BMI (kg/m2): 28.9 (11 Sep 2018 12:52)  BSA (m2): 2 (11 Sep 2018 12:52)      REVIEW OF SYSTEMS:    CONSTITUTIONAL:  As per HPI.    HEENT:  Eyes:  WNL  . ENT:  No earache, sore throat or runny nose.    CARDIOVASCULAR:  No pressure, squeezing, strangling, tightness, heaviness or aching about the chest, neck, axilla or epigastrium.    RESPIRATORY:  No cough, shortness of breath, PND or orthopnea.    GASTROINTESTINAL:  No nausea, vomiting or diarrhea.    GENITOURINARY:  No dysuria, frequency or urgency.    MUSCULOSKELETAL:  As per HPI.    SKIN:  No change in skin, hair or nails.    NEUROLOGIC:  No paresthesias, fasciculations, seizures or weakness.                  PHYSICAL EXAMINATION:    GENERAL: The patient is a well-developed, well-nourished ___ IN NAD     VITAL SIGNS: T(C): 36.7 (18 @ 08:26), Max: 38.7 (18 @ 22:37)  HR: 88 (18 @ 08:01) (76 - 98)  BP: 133/62 (18 @ 08:01) (122/71 - 135/73)  RR: 16 (18 @ 08:01) (16 - 22)  SpO2: 98% (18 @ 08:01) (97% - 100%)  Wt(kg): --    HEENT: Head is normocephalic and atraumatic.  ANICTERIC  NECK: Supple. No carotid bruits.  No lymphadenopathy or thyromegaly.    LUNGS: COARSE BREATH SOUNDS    HEART: Regular rate and rhythm without murmur.    ABDOMEN: Soft, nontender, and nondistended.  Positive bowel sounds.  No hepatosplenomegaly was noted. NO REBOUND NO GUARDING    EXTREMITIES: NO EDEMA NO ERYTHEMA    NEUROLOGIC: NON FOCAL      SKIN: No ulceration or induration present. NO RASH           Culture Results:   No growth to date  Culture in progress ( @ 19:20)  Culture Results:   Moderate Corynebacterium species ( @ 19:32)       URINE CX          LABS:                        10.5   5.0   )-----------( 151      ( 12 Sep 2018 08:40 )             32.1         139  |  107  |  8.0  ----------------------------<  86  3.7   |  21.0<L>  |  0.23<L>    Ca    8.6      12 Sep 2018 08:40  Phos  4.2       Mg     2.0         TPro  7.0  /  Alb  3.4  /  TBili  0.2<L>  /  DBili  x   /  AST  33<H>  /  ALT  32  /  AlkPhos  96      PT/INR - ( 11 Sep 2018 16:42 )   PT: 13.5 sec;   INR: 1.22 ratio         PTT - ( 11 Sep 2018 16:42 )  PTT:30.5 sec  Urinalysis Basic - ( 11 Sep 2018 16:33 )    Color: Yellow / Appearance: Clear / S.015 / pH: x  Gluc: x / Ketone: Trace  / Bili: Negative / Urobili: Negative mg/dL   Blood: x / Protein: Negative mg/dL / Nitrite: Negative   Leuk Esterase: Negative / RBC: 0-2 /HPF / WBC 0-2   Sq Epi: x / Non Sq Epi: Occasional / Bacteria: Occasional        RADIOLOGY & ADDITIONAL STUDIES:      ASSESSMENT/PLAN    29 y/o female with hx of anxiety and depression (currently stable and off meds), presents with headaches and fevers since friday. Pt's son was recently diagnosed with viral pharyngitis, and shortly after that pt developed sore throat, cough with brown phlegm, and fevers (max temp at home 105 F). Assoc with severe retroorbital headaches, photophobia and neck stiffness that worsened over the past couple of days. She has been taking alternating tylenol and motrin with some relief noted. No recent travel or tick bites. She also reports mild nausea and diarrhea with 6-8 watery BMs today ([place] type 7). Pt was given amoxicillin 2 days ago for pharyngitis,   developed a mild non-pruritic macular rash on her upper torso, that has since resolved.   PT WITH LUMBAR PUNCTURE DONE  CSF PCR WITH ENTEROVIRUS  PT FEELING BETTER THIS AM  WILL D/C ROCEPHIN  D/C ISOLATION  WILL FOLLOW UP  BLOOD CX SO FAR NEGATIVE                GERARDO DUMAS MD

## 2018-09-12 NOTE — CHART NOTE - NSCHARTNOTEFT_GEN_A_CORE
Post midnight admission brief note. Patient was seen and examined by overnight hospitalist this am. HPI reviewed. Labs, vitals noted. I have personally seen and examined this patient today.     Vs noted   Exam:   Gen: young woman in nad  HEENT: eomi, perrla, thyromegaly noted   CVS: S1S2nl, no m/r/g RRR   Lungs: clear   Abd: soft, nt, bs +  Ext: no c/c/e   Skin: grossly intact     Labs noted     29 y/o female with hx of anxiety and depression (currently stable and off meds), presents with headaches and fevers, admitted for meningitis, possible early bacterial vs viral etiology.  Admitted for viral meningitis. Thought to be more so viral per ID   Will monitor overnight   d/c abx   panels noted   Due to age and screening yearly - will do hiv and hep c   For thyroid - will do US and f/up T3  Her tsh is low and T4 is only mildly elevated     Plan d/w patient and mother at bedside.

## 2018-09-12 NOTE — H&P ADULT - ASSESSMENT
29 y/o female with hx of anxiety and depression (currently stable and off meds), presents with headaches and fevers, admitted for viral meningitis and rule out bacterial etiology. 29 y/o female with hx of anxiety and depression (currently stable and off meds), presents with headaches and fevers, admitted for viral meningitis and rule out bacterial etiology.     Viral meningitis  -likely viral etiology  -empiric treatment with Rocephin for bacterial etiology until definitively ruled out  -f/u CSF culture  -droplet precautions      Acute gastroenteritis  -likely viral syndrome, +sick contact  -IV fluids    Marijuana use      Preventive measure  -encouraged ambulation 27 y/o female with hx of anxiety and depression (currently stable and off meds), presents with headaches and fevers, admitted for viral meningitis and rule out bacterial etiology.     Viral meningitis  -likely viral etiology  -empiric treatment with Rocephin for bacterial etiology until definitively ruled out  -f/u CSF culture  -droplet precautions      Acute gastroenteritis  -likely viral syndrome, +sick contact  -IV fluids    Marijuana use  -urine tox + for THC  -counseling provided    Preventive measure  -encouraged ambulation 29 y/o female with hx of anxiety and depression (currently stable and off meds), presents with headaches and fevers, admitted for viral meningitis and rule out bacterial etiology.     Viral meningitis  -likely viral etiology, CSF pcr positive for enterovirus  -noted to have neutrophilic dominance on csf studies  -empiric tx with Rocephin for bacterial meningitis until definitively ruled out  -s/p acyclovir x 1 dose, now discontinued as hsv pcr negative  -CT head negative  -infectious mono screen negative  -no leukocytosis, lactate wnl  -f/u CSF and blood cultures  -f/u tick-borne panel, RMSF, lyme and babesia studies  -droplet precautions  -ID consult    Acute gastroenteritis  -likely viral syndrome, +sick contacts  -IV fluids  -zofran prn nausea  -CT abd showing mild hepatosplenomegaly, but noted to have neg infectious mono screen  -consider c.diff studies if no improvement    Marijuana use  -urine tox + for THC  -counseling provided    Preventive measure  -encouraged ambulation 29 y/o female with hx of anxiety and depression (currently stable and off meds), presents with headaches and fevers, admitted for meningitis, likely viral etiology.    Viral meningitis  -likely viral etiology, CSF pcr positive for enterovirus  -normal glucose and protein, however noted to have neutrophilic dominance on csf studies  -empiric tx with Rocephin for bacterial meningitis until definitively ruled out  -s/p acyclovir x 1 dose, now discontinued as hsv pcr negative  -CT head negative  -infectious mono screen negative  -no leukocytosis, lactate wnl  -f/u CSF and blood cultures  -f/u tick-borne panel, RMSF, lyme and babesia studies  -droplet precautions  -ID consult    Acute gastroenteritis  -likely viral syndrome, +sick contacts  -diarrhea bristol scale 7  -IV fluids  -zofran prn nausea  -CT abd showing mild hepatosplenomegaly, but noted to have neg infectious mono screen  -consider c.diff studies if no improvement    Marijuana use  -urine tox + for THC  -counseling provided    Preventive measure  -encouraged ambulation 27 y/o female with hx of anxiety and depression (currently stable and off meds), presents with headaches and fevers, admitted for meningitis, possible early bacterial vs viral etiology.    Meningitis  -possible early bacterial vs viral etiology  -CSF pcr positive for enterovirus, but may also have superimposed bacterial infxn  -normal glucose and protein, however noted to have neutrophilic dominance on csf studies  -empiric tx with Rocephin for bacterial meningitis until definitively ruled out  -s/p acyclovir x 1 dose, now discontinued as hsv pcr negative  -CT head negative  -infectious mono screen negative  -no leukocytosis, lactate wnl  -f/u CSF and blood cultures  -f/u tick-borne panel, RMSF, lyme and babesia studies  -droplet precautions  -ID consult    Acute gastroenteritis  -likely viral syndrome, +sick contacts  -diarrhea bristol scale 7  -IV fluids  -zofran prn nausea  -clear liquids diet, advance as tolerated  -CT abd showing mild hepatosplenomegaly, but noted to have neg infectious mono screen  -consider c.diff studies if no improvement    Marijuana use  -urine tox + for THC  -counseling provided    Preventive measure  -encouraged ambulation

## 2018-09-12 NOTE — H&P ADULT - NSHPPHYSICALEXAM_GEN_ALL_CORE
Vital Signs Last 24 Hrs  T(C): 37.2 (12 Sep 2018 02:15), Max: 38.7 (11 Sep 2018 22:37)  T(F): 99 (12 Sep 2018 02:15), Max: 101.6 (11 Sep 2018 22:37)  HR: 76 (12 Sep 2018 02:15) (76 - 98)  BP: 135/73 (12 Sep 2018 02:15) (122/71 - 135/73)  RR: 18 (12 Sep 2018 02:15) (17 - 22)  SpO2: 99% (12 Sep 2018 02:15) (97% - 100%)    General: NAD, resting comfortably in bed  HEENT: NC/AT, PERRLA 2-3mm b/l, EOMI, throat mildly erythematous, tonsils enlarged, no exudates, MMM  Neck: supple, +right cervical LN, +neck stiffness reported with flexion  Resp: CTA bilaterally, no crackles or wheezes  Cardio: S1S2+, RRR, no murmurs  Abdomen: soft, BS+ normoactive, non-distended, non-tender  Vascular: no peripheral edema, DP pulses 2+ b/l  MSK: FROM in all extremities  Neuro: AAOx3, CN 2-12 grossly intact. Sensation grossly intact in all extremities, 5/5 strength in all extremities, negative Kernig's and Brudzinki's test  Skin: no rash

## 2018-09-13 ENCOUNTER — TRANSCRIPTION ENCOUNTER (OUTPATIENT)
Age: 28
End: 2018-09-13

## 2018-09-13 VITALS
DIASTOLIC BLOOD PRESSURE: 61 MMHG | HEART RATE: 61 BPM | OXYGEN SATURATION: 99 % | SYSTOLIC BLOOD PRESSURE: 119 MMHG | TEMPERATURE: 98 F | RESPIRATION RATE: 16 BRPM

## 2018-09-13 LAB
A PHAGOCYTOPH IGG TITR SER IF: SIGNIFICANT CHANGE UP TITER
B BURGDOR AB SER QL IA: NEGATIVE — SIGNIFICANT CHANGE UP
B MICROTI DNA BLD QL NAA+PROBE: NEGATIVE — SIGNIFICANT CHANGE UP
B MICROTI IGG TITR SER: SIGNIFICANT CHANGE UP
B MICROTI IGG TITR SER: SIGNIFICANT CHANGE UP TITER
B MICROTI IGM TITR SER: SIGNIFICANT CHANGE UP
BABESIA AB SER-ACNC: SIGNIFICANT CHANGE UP
BABESIA DNA SPEC QL NAA+PROBE: NEGATIVE — SIGNIFICANT CHANGE UP
BABESIA DNA SPEC QL NAA+PROBE: NEGATIVE — SIGNIFICANT CHANGE UP
E CHAFFEENSIS IGG TITR SER IF: SIGNIFICANT CHANGE UP TITER
EOSINOPHIL # BLD AUTO: 0.1 K/UL — SIGNIFICANT CHANGE UP (ref 0–0.5)
EOSINOPHIL NFR BLD AUTO: 1.9 % — SIGNIFICANT CHANGE UP (ref 0–6)
HCT VFR BLD CALC: 32 % — LOW (ref 37–47)
HCV AB S/CO SERPL IA: 0.17 S/CO — SIGNIFICANT CHANGE UP
HCV AB SERPL-IMP: SIGNIFICANT CHANGE UP
HGB BLD-MCNC: 10.7 G/DL — LOW (ref 12–16)
LYMPHOCYTES # BLD AUTO: 2.4 K/UL — SIGNIFICANT CHANGE UP (ref 1–4.8)
LYMPHOCYTES # BLD AUTO: 57.9 % — HIGH (ref 20–55)
MCHC RBC-ENTMCNC: 26.8 PG — LOW (ref 27–31)
MCHC RBC-ENTMCNC: 33.4 G/DL — SIGNIFICANT CHANGE UP (ref 32–36)
MCV RBC AUTO: 80 FL — LOW (ref 81–99)
MONOCYTES # BLD AUTO: 0.2 K/UL — SIGNIFICANT CHANGE UP (ref 0–0.8)
MONOCYTES NFR BLD AUTO: 5.9 % — SIGNIFICANT CHANGE UP (ref 3–10)
NEUTROPHILS # BLD AUTO: 1.4 K/UL — LOW (ref 1.8–8)
NEUTROPHILS NFR BLD AUTO: 34.3 % — LOW (ref 37–73)
PLATELET # BLD AUTO: 135 K/UL — LOW (ref 150–400)
RBC # BLD: 4 M/UL — LOW (ref 4.4–5.2)
RBC # FLD: 12.3 % — SIGNIFICANT CHANGE UP (ref 11–15.6)
T4 FREE SERPL-MCNC: 4.2 NG/DL — HIGH (ref 0.9–1.8)
THYROGLOB AB FLD IA-ACNC: 159 IU/ML — HIGH (ref 0–40)
THYROGLOB AB SERPL-ACNC: 159 IU/ML — HIGH (ref 0–40)
THYROGLOB SERPL-MCNC: 110 NG/ML — HIGH (ref 1.6–59.9)
THYROPEROXIDASE AB SERPL-ACNC: 3381 IU/ML — HIGH (ref 0–34.9)
WBC # BLD: 4.2 K/UL — LOW (ref 4.8–10.8)
WBC # FLD AUTO: 4.2 K/UL — LOW (ref 4.8–10.8)

## 2018-09-13 PROCEDURE — 36415 COLL VENOUS BLD VENIPUNCTURE: CPT

## 2018-09-13 PROCEDURE — 85730 THROMBOPLASTIN TIME PARTIAL: CPT

## 2018-09-13 PROCEDURE — 96365 THER/PROPH/DIAG IV INF INIT: CPT | Mod: XU

## 2018-09-13 PROCEDURE — 96375 TX/PRO/DX INJ NEW DRUG ADDON: CPT | Mod: XU

## 2018-09-13 PROCEDURE — 86850 RBC ANTIBODY SCREEN: CPT

## 2018-09-13 PROCEDURE — 83605 ASSAY OF LACTIC ACID: CPT

## 2018-09-13 PROCEDURE — 96376 TX/PRO/DX INJ SAME DRUG ADON: CPT | Mod: XU

## 2018-09-13 PROCEDURE — 85027 COMPLETE CBC AUTOMATED: CPT

## 2018-09-13 PROCEDURE — 84439 ASSAY OF FREE THYROXINE: CPT

## 2018-09-13 PROCEDURE — 84432 ASSAY OF THYROGLOBULIN: CPT

## 2018-09-13 PROCEDURE — 62270 DX LMBR SPI PNXR: CPT

## 2018-09-13 PROCEDURE — 81025 URINE PREGNANCY TEST: CPT

## 2018-09-13 PROCEDURE — 84157 ASSAY OF PROTEIN OTHER: CPT

## 2018-09-13 PROCEDURE — 99223 1ST HOSP IP/OBS HIGH 75: CPT

## 2018-09-13 PROCEDURE — 84436 ASSAY OF TOTAL THYROXINE: CPT

## 2018-09-13 PROCEDURE — 80048 BASIC METABOLIC PNL TOTAL CA: CPT

## 2018-09-13 PROCEDURE — 99232 SBSQ HOSP IP/OBS MODERATE 35: CPT

## 2018-09-13 PROCEDURE — 86308 HETEROPHILE ANTIBODY SCREEN: CPT

## 2018-09-13 PROCEDURE — 87483 CNS DNA AMP PROBE TYPE 12-25: CPT

## 2018-09-13 PROCEDURE — 86753 PROTOZOA ANTIBODY NOS: CPT

## 2018-09-13 PROCEDURE — 86901 BLOOD TYPING SEROLOGIC RH(D): CPT

## 2018-09-13 PROCEDURE — 84443 ASSAY THYROID STIM HORMONE: CPT

## 2018-09-13 PROCEDURE — 99239 HOSP IP/OBS DSCHRG MGMT >30: CPT

## 2018-09-13 PROCEDURE — 82945 GLUCOSE OTHER FLUID: CPT

## 2018-09-13 PROCEDURE — 76536 US EXAM OF HEAD AND NECK: CPT

## 2018-09-13 PROCEDURE — 87086 URINE CULTURE/COLONY COUNT: CPT

## 2018-09-13 PROCEDURE — 84445 ASSAY OF TSI GLOBULIN: CPT

## 2018-09-13 PROCEDURE — 71045 X-RAY EXAM CHEST 1 VIEW: CPT

## 2018-09-13 PROCEDURE — 80053 COMPREHEN METABOLIC PANEL: CPT

## 2018-09-13 PROCEDURE — 86757 RICKETTSIA ANTIBODY: CPT

## 2018-09-13 PROCEDURE — 87040 BLOOD CULTURE FOR BACTERIA: CPT

## 2018-09-13 PROCEDURE — 86800 THYROGLOBULIN ANTIBODY: CPT

## 2018-09-13 PROCEDURE — 86376 MICROSOMAL ANTIBODY EACH: CPT

## 2018-09-13 PROCEDURE — 87476 LYME DIS DNA AMP PROBE: CPT

## 2018-09-13 PROCEDURE — 85610 PROTHROMBIN TIME: CPT

## 2018-09-13 PROCEDURE — 81001 URINALYSIS AUTO W/SCOPE: CPT

## 2018-09-13 PROCEDURE — 80307 DRUG TEST PRSMV CHEM ANLYZR: CPT

## 2018-09-13 PROCEDURE — 86900 BLOOD TYPING SEROLOGIC ABO: CPT

## 2018-09-13 PROCEDURE — 96361 HYDRATE IV INFUSION ADD-ON: CPT

## 2018-09-13 PROCEDURE — 99285 EMERGENCY DEPT VISIT HI MDM: CPT | Mod: 25

## 2018-09-13 PROCEDURE — 84100 ASSAY OF PHOSPHORUS: CPT

## 2018-09-13 PROCEDURE — 84480 ASSAY TRIIODOTHYRONINE (T3): CPT

## 2018-09-13 PROCEDURE — 86666 EHRLICHIA ANTIBODY: CPT

## 2018-09-13 PROCEDURE — 86703 HIV-1/HIV-2 1 RESULT ANTBDY: CPT

## 2018-09-13 PROCEDURE — 74177 CT ABD & PELVIS W/CONTRAST: CPT

## 2018-09-13 PROCEDURE — 83735 ASSAY OF MAGNESIUM: CPT

## 2018-09-13 PROCEDURE — 86803 HEPATITIS C AB TEST: CPT

## 2018-09-13 PROCEDURE — 87070 CULTURE OTHR SPECIMN AEROBIC: CPT

## 2018-09-13 PROCEDURE — 89051 BODY FLUID CELL COUNT: CPT

## 2018-09-13 PROCEDURE — 70450 CT HEAD/BRAIN W/O DYE: CPT

## 2018-09-13 PROCEDURE — 87205 SMEAR GRAM STAIN: CPT

## 2018-09-13 RX ORDER — METHIMAZOLE 10 MG/1
1 TABLET ORAL
Qty: 30 | Refills: 0 | OUTPATIENT
Start: 2018-09-13 | End: 2018-10-12

## 2018-09-13 RX ORDER — ACETAMINOPHEN 500 MG
2 TABLET ORAL
Qty: 56 | Refills: 0 | OUTPATIENT
Start: 2018-09-13 | End: 2018-09-19

## 2018-09-13 RX ORDER — PROPRANOLOL HCL 160 MG
10 CAPSULE, EXTENDED RELEASE 24HR ORAL EVERY 8 HOURS
Qty: 0 | Refills: 0 | Status: DISCONTINUED | OUTPATIENT
Start: 2018-09-13 | End: 2018-09-13

## 2018-09-13 RX ADMIN — Medication 250 MILLIGRAM(S): at 06:05

## 2018-09-13 RX ADMIN — Medication 30 MILLIGRAM(S): at 00:10

## 2018-09-13 RX ADMIN — Medication 30 MILLIGRAM(S): at 14:05

## 2018-09-13 RX ADMIN — Medication 650 MILLIGRAM(S): at 03:00

## 2018-09-13 RX ADMIN — Medication 30 MILLIGRAM(S): at 10:51

## 2018-09-13 NOTE — DISCHARGE NOTE ADULT - ADDITIONAL INSTRUCTIONS
- Please follow up with Endocrinologist in 1-2 weeks.  - Please follow up with your primary care doctor within a week. - Please follow up with Endocrinologist in 1-2 weeks (Dr Puri)  - Please follow up with your primary care doctor within a week (if you do not have one you can go to either Dr SIMON Narayan or Dr Ely at the [facility] clinic)

## 2018-09-13 NOTE — DISCHARGE NOTE ADULT - MEDICATION SUMMARY - MEDICATIONS TO STOP TAKING
I will STOP taking the medications listed below when I get home from the hospital:    amoxicillin 500 mg oral tablet  -- 1 tab(s) by mouth every 8 hours   -- Finish all this medication unless otherwise directed by prescriber.

## 2018-09-13 NOTE — PROGRESS NOTE ADULT - ASSESSMENT
29 y/o female with hx of anxiety and depression (currently stable and off meds), presents with headaches and fevers since friday. Pt's son was recently diagnosed with viral pharyngitis, and shortly after that pt developed sore throat, cough with brown phlegm, and fevers (max temp at home 105 F). Assoc with severe retroorbital headaches, photophobia and neck stiffness that worsened over the past couple of days. She has been taking alternating tylenol and motrin with some relief noted. No recent travel or tick bites. She also reports mild nausea and diarrhea with 6-8 watery BMs today ([place] type 7). Pt was given amoxicillin 2 days ago for pharyngitis,   developed a mild non-pruritic macular rash on her upper torso, that has since resolved.   PT WITH LUMBAR PUNCTURE DONE  CSF PCR WITH ENTEROVIRUS  PT FEELING BETTER    BLOOD CX SO FAR NEGATIVE  OBSERVE OFF ABX WILL FOLLOW UP AS NEEDED  PLEASE CALL IF QUESTIONS

## 2018-09-13 NOTE — CONSULT NOTE ADULT - SUBJECTIVE AND OBJECTIVE BOX
HPI:  27 y/o female with hx of anxiety and depression (currently stable and off meds), presents with headaches and fevers since friday. Pt's son was recently diagnosed with viral pharyngitis, and shortly after that pt developed sore throat, cough with brown phlegm, and fevers (max temp at home 105 F). Assoc with severe retroorbital headaches, photophobia and neck stiffness that worsened over the past couple of days. She has been taking alternating tylenol and motrin with some relief noted. No recent travel or tick bites. She also reports mild nausea and diarrhea with 6-8 watery BMs today ([place] type 7). Pt was given amoxicillin 2 days ago for pharyngitis, and last night she developed a mild non-pruritic macular rash on her upper torso, that has since resolved. She states that she has taken amoxicillin on several occasions in the past without any adverse reactions.Pt found to be hyperthyroid as well with goiter  Pt reports to be feelign better   HAs noted goiter over the past several months         PAST MEDICAL & SURGICAL HISTORY:  Depressed  Anxiety  No significant past surgical history      FAMILY HISTORY:    Mom Hypothyroid   Brother  Duchenne Muscular dystrophy   son healthy     Soc Hx [place] expsure to chem or XRT  + smoker 1 ppd x 2-3 year   REVIEW OF SYSTEMS:    Constitutional: No fever, no chills, no change in weight.    Eyes: eye swelling is better no  blurry vision, no redness, no loss of vision.    Neck: No neck pain, no change in voice sore throat is better .    Lungs: occaisonal shortness of breath, no wheezing, no cough pt not sure if she has asthma     CV: No chest pain, no palpitations, no pain with walking.    GI: No nausea, no vomiting, no constipation, no diarrhea, no abdominal pain    : No urinary frequency, no blood in urine, no urinary burning, no difficulty voiding.    Musculoskeletal: No muscle pain, no joint pain, no swelling.    Skin: No rash now has subsided  no infections.    Neurologic: No headaches, no weakness, no burning or pain in feet, no tremor.    Endocrine: Sometimes hea tintoerance     Psych: No depression, no anxiety, no trouble concentrating    MEDICATIONS  (STANDING):  methimazole 10 milliGRAM(s) Oral daily    MEDICATIONS  (PRN):  acetaminophen   Tablet .. 650 milliGRAM(s) Oral every 6 hours PRN Temp greater or equal to 38C (100.4F), Mild Pain (1 - 3)  ketorolac   Injectable 30 milliGRAM(s) IV Push every 8 hours PRN Severe Pain (7 - 10)  naproxen 500 milliGRAM(s) Oral two times a day PRN Moderate Pain (4 - 6)  ondansetron Injectable 4 milliGRAM(s) IV Push every 8 hours PRN Nausea and/or Vomiting      Allergies    No Known Allergies    Intolerances      PHYSICAL EXAM:    Vital Signs Last 24 Hrs  T(C): 36.7 (13 Sep 2018 07:32), Max: 36.9 (13 Sep 2018 03:18)  T(F): 98 (13 Sep 2018 07:32), Max: 98.5 (13 Sep 2018 03:18)  HR: 61 (13 Sep 2018 07:32) (61 - 97)  BP: 119/61 (13 Sep 2018 07:32) (119/61 - 127/78)  BP(mean): --  RR: 16 (13 Sep 2018 07:32) (16 - 18)  SpO2: 99% (13 Sep 2018 07:32) (99% - 100%)    General appearance: Well developed, well nourished.    Eyes NO LR muscle hypertrophy  on left  ? on right - mild     Neck: Trachea midline. Bilateral thyroid enlargement rigth greater than left  firmgland  - mild thyrid bruit    Lungs: Normal respiratory excursion. Lungs clear.    CV: Regular cardiac rhythm. No murmur. Carotid and pedal pulses intact.    Musculoskeletal: No cyanosis, clubbing, or edema. No pedal lesions.    Skin: Warm and moist. No rash. No acanthosis.    Neuro: Cranial nerves intact. Normal motor and sensory function. DTR's normal  mild tremor.              LABS:                        10.7   4.2   )-----------( 135      ( 13 Sep 2018 05:21 )             32.0     09-12    139  |  107  |  8.0  ----------------------------<  86  3.7   |  21.0<L>  |  0.23<L>    Ca    8.6      12 Sep 2018 08:40  Phos  4.2     09-12  Mg     2.0     09-12          TSH <0.1  T4, Serum: 12.6 ug/dL ( @ 23:03)  T4, Serum: 12.3 ug/dL ( @ 08:40)          RADIOLOGY & ADDITIONAL STUDIES:    Thyroid Stimulating Hormone, Serum (18 @ 23:03)    Thyroid Stimulating Hormone, Serum: <0.10 uIU/mL    < from: US Thyroid + Parathyroid (18 @ 19:12) >   EXAM:  US THYROID PARATHYROID                          PROCEDURE DATE:  2018          INTERPRETATION:  Ultrasound of the thyroid gland         CLINICAL INFORMATION:       Hyperthyroidism, goiter.    TECHNIQUE:  Thyroid sonography was performed.    FINDINGS:   No comparison studies are available for review.    The thyroid gland is diffusely enlarged with heterogeneous increased   vascular parenchyma consistent with hyperthyroidism.    The right lobe measures 7.1 cm length by 3.0 cm AP by 4.1 cm transverse.    The left lobe measures 6.8 cm length by 2.6 cm AP by 2.3 cm transverse.      The thyroid isthmus measures 0.98 cm.  isthmus is enlarged heterogeneous   and vascular    No mass identified..    No parathyroid bed mass is seen.      IMPRESSION: Diffusely enlarged heterogeneous hypervascular thyroid gland   consistent with hyperthyroidism.  JAMIE SÁNCHEZ M.D., ATTENDING RADIOLOGIST  This document has been electronically signed. Sep 12 2018  9:07PM        <

## 2018-09-13 NOTE — DISCHARGE NOTE ADULT - CARE PROVIDER_API CALL
Kajal Alvarez); [facility] at [address],  [place] [address]  Phone: [phone]  Fax: [phone]    Tasha Parks), Endocrinology[facility]abDiabetes  [address], [place] [id number]  Phone: [phone]  Fax: [phone] Kajal Alvarez); [facility] at [address],  [place] [address]  Phone: [phone]  Fax: [phone]    Elizabeth Hernandes), Endocrinology[facility]abDiabetes; Internal Medicine  [address] [place] [address]  Phone: [phone]  Fax: [phone]    Juan Pablo Narayan), Internal Medicine  [address] [place] [address]  Phone: [phone]  Fax: [phone]

## 2018-09-13 NOTE — DISCHARGE NOTE ADULT - HOSPITAL COURSE
Patient is a 28 years old female with PMHx of anxiety and depression who presented with approximately 5 days of headache and fever (up to 105F measured at home) with associated severe retroorbital headaches, photophobia and neck stiffness that worsened during the 2 days prior to presentation in addition to mild nausea and watery diarrhea with 6-8 bowel movements on the day of admission. She self medicated with Acetaminophen and Ibuprofen with some relief. Patient was prescribed amoxicillin 2 days prior for pharyngitis and she developed a mild non-pruritic rash on her upper torso that at the time of her visit to the ED had already resolved. She reported one sick contact (son) who had viral pharyngitis after which she developed sore throat, cough with brown sputum and fever.  Upon examination, patient was found febrile, with neck stiffness, but with no meningeal signs.  A lumbar puncture was performed by the ED with findings as described below. She was initially started on empiric therapy with Acyclovir and Ceftriaxone. Patient was evaluated by ID consultant that recommended observing patient off antibiotics as the presentation was likely viral in etiology.  Of note, patient was also found to have a mild diffuse enlargement of her thyroid gland without palpable nodules with laboratory findings as detailed below and she was started on Methimazole. Patient and family were instructed to follow up closely with endocrinology as outpatient and they verbalized understanding.    All electrolyte abnormalities were monitored carefully and repleted as necessary during this hospitalization. At the time of discharge patient was hemodynamically stable and amenable to all terms of discharge. The patient has received verbal instructions from myself regarding discharge plans.     Length of Discharge: 45MIN

## 2018-09-13 NOTE — PROGRESS NOTE ADULT - SUBJECTIVE AND OBJECTIVE BOX
[facility]  INFECTIOUS DISEASES AND INTERNAL MEDICINE at [place]  =======================================================  Des Duncan MD  Diplomates American Board of Internal Medicine and Infectious Diseases  =======================================================    REBECCA AVITIA [id number]    Follow up: viral meningitis     Allergies:  No Known Allergies      Medications:  acetaminophen   Tablet .. 650 milliGRAM(s) Oral every 6 hours PRN  ketorolac   Injectable 30 milliGRAM(s) IV Push every 8 hours PRN  methimazole 10 milliGRAM(s) Oral <User Schedule>  naproxen 500 milliGRAM(s) Oral two times a day PRN  ondansetron Injectable 4 milliGRAM(s) IV Push every 8 hours PRN  saccharomyces boulardii 250 milliGRAM(s) Oral two times a day    SOCIAL       FAMILY   FAMILY HISTORY:  No pertinent family history in first degree relatives    REVIEW OF SYSTEMS:  CONSTITUTIONAL:  No Fever or chills  HEENT:   No diplopia or blurred vision.  No earache, sore throat or runny nose.  CARDIOVASCULAR:  No pressure, squeezing, strangling, tightness, heaviness or aching about the chest, neck, axilla or epigastrium.  RESPIRATORY:  No cough, shortness of breath, PND or orthopnea.  GASTROINTESTINAL:  No nausea, vomiting or diarrhea.  GENITOURINARY:  No dysuria, frequency or urgency. No Blood in urine  MUSCULOSKELETAL:   AS PER HPI  SKIN:  No change in skin, hair or nails.  NEUROLOGIC:  No paresthesias, fasciculations, seizures or weakness.  PSYCHIATRIC:  No disorder of thought or mood.  ENDOCRINE:  No heat or cold intolerance, polyuria or polydipsia.  HEMATOLOGICAL:  No easy bruising or bleeding.            Physical Exam:  ICU Vital Signs Last 24 Hrs  T(C): 36.7 (13 Sep 2018 07:32), Max: 37.6 (12 Sep 2018 18:27)  T(F): 98 (13 Sep 2018 07:32), Max: 99.7 (12 Sep 2018 18:27)  HR: 61 (13 Sep 2018 07:32) (61 - 97)  BP: 119/61 (13 Sep 2018 07:32) (119/61 - 148/80)  BP(mean): --  ABP: --  ABP(mean): --  RR: 16 (13 Sep 2018 07:32) (16 - 18)  SpO2: 99% (13 Sep 2018 07:32) (99% - 100%)    GEN: NAD, pleasant  HEENT: normocephalic and atraumatic. EOMI. MARIA T.    NECK: Supple. No carotid bruits.  No lymphadenopathy or thyromegaly.  LUNGS: Clear to auscultation.  HEART: Regular rate and rhythm without murmur.  ABDOMEN: Soft, nontender, and nondistended.  Positive bowel sounds.    : No CVA tenderness  EXTREMITIES: Without any cyanosis, clubbing, rash, lesions or edema.  MSK: no joint swelling  NEUROLOGIC: Cranial nerves II through XII are grossly intact.  PSYCHIATRIC: Appropriate affect .  SKIN: No ulceration or induration present.        Labs:      139  |  107  |  8.0  ----------------------------<  86  3.7   |  21.0<L>  |  0.23<L>    Ca    8.6      12 Sep 2018 08:40  Phos  4.2       Mg     2.0         TPro  7.0  /  Alb  3.4  /  TBili  0.2<L>  /  DBili  x   /  AST  33<H>  /  ALT  32  /  AlkPhos  96  -11                          10.7   4.2   )-----------( 135      ( 13 Sep 2018 05:21 )             32.0       PT/INR - ( 11 Sep 2018 16:42 )   PT: 13.5 sec;   INR: 1.22 ratio         PTT - ( 11 Sep 2018 16:42 )  PTT:30.5 sec  Urinalysis Basic - ( 11 Sep 2018 16:33 )    Color: Yellow / Appearance: Clear / S.015 / pH: x  Gluc: x / Ketone: Trace  / Bili: Negative / Urobili: Negative mg/dL   Blood: x / Protein: Negative mg/dL / Nitrite: Negative   Leuk Esterase: Negative / RBC: 0-2 /HPF / WBC 0-2   Sq Epi: x / Non Sq Epi: Occasional / Bacteria: Occasional      LIVER FUNCTIONS - ( 11 Sep 2018 16:03 )  Alb: 3.4 g/dL / Pro: 7.0 g/dL / ALK PHOS: 96 U/L / ALT: 32 U/L / AST: 33 U/L / GGT: x               CAPILLARY BLOOD GLUCOSE            RECENT CULTURES:   @ 19:20 .CSF CSF     No growth at 1 day.  Culture in progress    Moderate White blood cells  No organisms seen       @ 16:33 .Urine Clean Catch (Midstream)     No growth         @ 19:32 Skin throat     Moderate Corynebacterium species

## 2018-09-13 NOTE — PROGRESS NOTE ADULT - ATTENDING COMMENTS
Note addended where needed. Plan discussed with patient and grandmother. D/c planning today by 5-6pm despite whether endo comes or not.

## 2018-09-13 NOTE — DISCHARGE NOTE ADULT - OTHER SIGNIFICANT FINDINGS
10.7   4.2   )-----------( 135      ( 13 Sep 2018 05:21 )             32.0   09-12    139  |  107  |  8.0  ----------------------------<  86  3.7   |  21.0<L>  |  0.23<L>    Ca    8.6      12 Sep 2018 08:40  Phos  4.2     09-12  Mg     2.0     09-12    TPro  7.0  /  Alb  3.4  /  TBili  0.2<L>  /  DBili  x   /  AST  33<H>  /  ALT  32  /  AlkPhos  96  09-11    Thyroid Stimulating Hormone, Serum: <0.10 uIU/mL (09.12.18 @ 23:03)  T4, Serum: 12.6 ug/dL (09.12.18 @ 23:03)  Triiodothyronine, Total (T3 Total): 238 ng/dL (09.12.18 @ 23:04)    < from: US Thyroid + Parathyroid (09.12.18 @ 19:12) >  FINDINGS:  No comparison studies are available for review.    The thyroid gland is diffusely enlarged with heterogeneous increased vascular parenchyma consistent with hyperthyroidism.    The right lobe measures 7.1 cm length by 3.0 cm AP by 4.1 cm transverse.    The left lobe measures 6.8 cm length by 2.6 cm AP by 2.3 cm transverse.     The thyroid isthmus measures 0.98 cm.  Isthmus is enlarged heterogeneous and vascular  No mass identified..  No parathyroid bed mass is seen.    IMPRESSION: Diffusely enlarged heterogeneous hypervascular thyroid gland consistent with hyperthyroidism.  < end of copied text >    CSF PCR Panel (09.11.18 @ 21:29)    CSF PCR Result: Detected    Enterovirus: Detected    Culture - CSF with Gram Stain . (09.11.18 @ 19:20)    Gram Stain:   Moderate White blood cells  No organisms seen    Specimen Source: CSF    Culture Results: No growth at 1 day.Culture in progress    Protein, CSF: 30 mg/dL (09.11.18 @ 19:19)  Glucose, CSF: 52 mg/dL (09.11.18 @ 19:19)  Cerebrospinal Fluid Cell Count-1 (09.11.18 @ 19:19)    Total Nucleated Cell Count, CSF: 484 /uL    CSF Color: No Color    CSF Appearance: Clear    CSF Lymphocytes: 6 %    CSF Monocytes/Macrophages: 4 %    CSF Segmented Neutrophils: 90 %

## 2018-09-13 NOTE — DISCHARGE NOTE ADULT - PROVIDER TOKENS
FREE:[LAST:[Kim],FIRST:[Kajal ROYAL); Family Medicine],PHONE:[phone]],FAX:[phone]],ADDRESS:[[facility] [place] [address]]],TOKEN:'[id number]' FREE:[LAST:[Kim],FIRST:[Kajal ROYAL); Family Medicine],PHONE:[phone]],FAX:[phone]],ADDRESS:[[facility] [place] [address]]],TOKEN:'[id number]:'[id number]'

## 2018-09-13 NOTE — PROGRESS NOTE ADULT - ASSESSMENT
27 y/o female with hx of anxiety and depression (currently stable and off meds), presents with headaches and fevers, admitted for meningitis, possible early bacterial vs viral etiology.    Meningitis  -Likely viral etiology with CSF pcr positive for enterovirus.  -normal glucose and protein, however noted to have neutrophilic dominance on csf studies  -s/p acyclovir x 1 dose, now discontinued as hsv pcr negative  -CT head negative  -infectious mono screen negative  -no leukocytosis, lactate wnl  -f/u CSF and blood cultures  -f/u tick-borne panel, RMSF, lyme and babesia studies  -ID recommendations appreciated.    Hyperthyroidism  -Thyroid studies resulted as above.  -Patient started on methimazole 10mg BID, will hold from beta blockers at the moment.  -Patient and mother advised to set up outpatient follow up with endocrinology  -Follow up Thyroid stimulating antibody (Graves' disease) and Thyroglobulin antibody (Autoimmune thyroid disorder at large) results.    Acute gastroenteritis  -likely viral syndrome, +sick contacts  -Tolerating PO intake, has not needed Zofran, will discontinue IV fluids.  -CT abd showing mild hepatosplenomegaly, but noted to have neg infectious mono screen    Marijuana use  -urine tox + for THC  -counseling provided    Preventive measure  -encouraged ambulation 27 y/o female with hx of anxiety and depression (currently stable and off meds), presents with headaches and fevers, admitted for meningitis, possible early bacterial vs viral etiology. Incidentally found to have an enlarged thyroid with tfts reflective of hyperthyroidism     1. Meningitis with pancytopenia  -Likely viral etiology with CSF pcr positive for enterovirus  -normal glucose and protein, however noted to have neutrophilic dominance on csf studies  -s/p acyclovir x 1 dose, now discontinued as hsv pcr negative  -CT head negative  -infectious mono screen negative  -f/u CSF and blood cultures pending and negative thus far   -f/u tick-borne panel, RMSF, lyme and babesia studies  -ID recommendations appreciated and stated off isolation and likely viral   -HIV negative    2.Hyperthyroidism - graves vs thyroiditis (however non tender) with no tachycardia noted   -Thyroid studies resulted as above. 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  -for d/c will make tapazole daily as fairly stable otherwise   -Patient and mother advised to set up outpatient follow up with endocrinology  -Follow up Thyroid stimulating antibody (Graves' disease) and Thyroglobulin antibody (Autoimmune thyroid disorder at large) results.  -Endo called, f/up as OP as well    3.Acute gastroenteritis  -likely viral syndrome, +sick contacts  -Tolerating PO intake, has not needed Zofran, will discontinue IV fluids.  -CT abd showing mild hepatosplenomegaly, but noted to have neg infectious mono screen    4.Marijuana use  -urine tox + for THC  -counseling provided    5.Preventive measure  -encouraged ambulation    6. Dispo - d/c to home today. Please see discharge papers for details. D/w patient and grandmother

## 2018-09-13 NOTE — DISCHARGE NOTE ADULT - PLAN OF CARE
Resolution Viral meningitis requires only symptomatic treatment of your fever and headache with Acetaminophen and/or Ibuprofen.  However, you need to seek immediate medical care if you don't notice improvement or if your experience worsening of your symptoms.  If you develop intractable nausea/vomiting, seizures, continue having high fevers or you or your family notice worsening mental status (such as confusion, or lethargy) You have hyperthyroidism. For this condition, it is important to continue taking medication as prescribed. Be sure to have your Endocronologist repeat blood work to check your Thyroid function to make sure that your current dose does not need to be changed to reach the best possible level for you.   Call 911 if you have the following symptoms that can be caused by dangerously high levels of thyroid hormone:  - Fever: Typically anything over 100.5F  - Nervousness and/or confusion  - Fainting/Unconsciousness. You have a known diagnosis or Depression and/ or Anxiety. If you are on medication for this condition, it is very important that you continue to take the medication, EVEN if you feel well! These medications are a very delicate balance and should be taken EXACTLY as prescribed, never more or less than what has been advised to you. If you ever feel overwhelmed like your symptoms are becoming too much to handle, or if you feel like hurting yourself or others, PLEASE contact your health care provider IMMEDIATELY or come directly to the ER. If you accidentally overdose on your medications, come immediately to the ER. Do not take your medications with alcohol or mix with illegal or narcotic medications. We started you on tapazole once daily   Please follow up levels in 4 weeks of your thyroid   You have hyperthyroidism. For this condition, it is important to continue taking medication as prescribed. Be sure to have your Endocrinologist repeat blood work to check your Thyroid function to make sure that your current dose does not need to be changed to reach the best possible level for you.   Call 911 if you have the following symptoms that can be caused by dangerously high levels of thyroid hormone:  - Fever: Typically anything over 100.5F  - Nervousness and/or confusion  - Fainting/Unconsciousness.

## 2018-09-13 NOTE — DISCHARGE NOTE ADULT - MEDICATION SUMMARY - MEDICATIONS TO TAKE
I will START or STAY ON the medications listed below when I get home from the hospital:    acetaminophen 325 mg oral tablet  -- 2 tab(s) by mouth every 6 hours, As Needed -for fever   -- This product contains acetaminophen.  Do not use  with any other product containing acetaminophen to prevent possible liver damage.    -- Indication: For Fever    naproxen 500 mg oral tablet  -- 1 tab(s) by mouth 2 times a day, As Needed -for moderate pain   -- Check with your doctor before becoming pregnant.  May cause drowsiness or dizziness.  Obtain medical advice before taking any non-prescription drugs as some may affect the action of this medication.  Take with food or milk.    -- Indication: For Headache/Pain    methIMAzole 10 mg oral tablet  -- 1 tab(s) by mouth once a day  -- Indication: For Hyperthyroidism

## 2018-09-13 NOTE — DISCHARGE NOTE ADULT - CARE PLAN
Principal Discharge DX:	Viral meningitis  Goal:	Resolution  Assessment and plan of treatment:	Viral meningitis requires only symptomatic treatment of your fever and headache with Acetaminophen and/or Ibuprofen.  However, you need to seek immediate medical care if you don't notice improvement or if your experience worsening of your symptoms.  If you develop intractable nausea/vomiting, seizures, continue having high fevers or you or your family notice worsening mental status (such as confusion, or lethargy)  Secondary Diagnosis:	Hyperthyroidism  Assessment and plan of treatment:	You have hyperthyroidism. For this condition, it is important to continue taking medication as prescribed. Be sure to have your Endocronologist repeat blood work to check your Thyroid function to make sure that your current dose does not need to be changed to reach the best possible level for you.   Call 911 if you have the following symptoms that can be caused by dangerously high levels of thyroid hormone:  - Fever: Typically anything over 100.5F  - Nervousness and/or confusion  - Fainting/Unconsciousness.  Secondary Diagnosis:	Anxiety with depression  Assessment and plan of treatment:	You have a known diagnosis or Depression and/ or Anxiety. If you are on medication for this condition, it is very important that you continue to take the medication, EVEN if you feel well! These medications are a very delicate balance and should be taken EXACTLY as prescribed, never more or less than what has been advised to you. If you ever feel overwhelmed like your symptoms are becoming too much to handle, or if you feel like hurting yourself or others, PLEASE contact your health care provider IMMEDIATELY or come directly to the ER. If you accidentally overdose on your medications, come immediately to the ER. Do not take your medications with alcohol or mix with illegal or narcotic medications. Principal Discharge DX:	Viral meningitis  Goal:	Resolution  Assessment and plan of treatment:	Viral meningitis requires only symptomatic treatment of your fever and headache with Acetaminophen and/or Ibuprofen.  However, you need to seek immediate medical care if you don't notice improvement or if your experience worsening of your symptoms.  If you develop intractable nausea/vomiting, seizures, continue having high fevers or you or your family notice worsening mental status (such as confusion, or lethargy)  Secondary Diagnosis:	Hyperthyroidism  Assessment and plan of treatment:	We started you on tapazole once daily   Please follow up levels in 4 weeks of your thyroid   You have hyperthyroidism. For this condition, it is important to continue taking medication as prescribed. Be sure to have your Endocrinologist repeat blood work to check your Thyroid function to make sure that your current dose does not need to be changed to reach the best possible level for you.   Call 911 if you have the following symptoms that can be caused by dangerously high levels of thyroid hormone:  - Fever: Typically anything over 100.5F  - Nervousness and/or confusion  - Fainting/Unconsciousness.  Secondary Diagnosis:	Anxiety with depression  Assessment and plan of treatment:	You have a known diagnosis or Depression and/ or Anxiety. If you are on medication for this condition, it is very important that you continue to take the medication, EVEN if you feel well! These medications are a very delicate balance and should be taken EXACTLY as prescribed, never more or less than what has been advised to you. If you ever feel overwhelmed like your symptoms are becoming too much to handle, or if you feel like hurting yourself or others, PLEASE contact your health care provider IMMEDIATELY or come directly to the ER. If you accidentally overdose on your medications, come immediately to the ER. Do not take your medications with alcohol or mix with illegal or narcotic medications.

## 2018-09-13 NOTE — CONSULT NOTE ADULT - ASSESSMENT
Graves disease likely -await thryoid AB  for TSI  Pt will continue MMI 10 mg qd  dw pt- not likely pregnant - had 2 neg HCG test   but due for period tomorrow   If does not get  period- will repeat and let me know   as would have to change to PTU during first trimester  -     dw pt importance of planned pregnancy  as need to have thyroid levels controlled   Pt not plannign preganancy now and has been using protection   dw pt etiology of Graves- autoimmune in nature, effects on eyes,   Will cont MMI for now  10 mg qd   Sonogram cw GD -   Smoking- advised pt to stop smoking as this can worsen eye condition in pt with GD- pt motivated to quit smoking

## 2018-09-13 NOTE — ED ADULT NURSE REASSESSMENT NOTE - NS ED NURSE REASSESS COMMENT FT1
Droplet precaution initiated at this time due to bacterial Meningitis not being ruled out, pending culture.
Report given at change of shift to receiving RN Ashley Lombardi and pt endorsed to same for follow up and continuity of care.
Report received from outgoing RN's Awad and Acosta at change of shift and assumed care of pt at that time. Pt received on stretcher in no acute distress, awake, alert and oriented x 4, vss, no complaints, await bed placement. Will continue to monitor.
assumed care of pt  pt resting comfortable  walking around room A2  family at bedside   DROPLET ISO conts.   bedside commode in room,   IVF Plasmalyte infusing as ordered via 20g RAC site clean/dry  vitals WNL    afebrile no complaints presently
droplet ISO discontinued as ordered  pt mom aware   pt sitting up eating drinking   'I feel the fever coming back"   99.7 orally will cont to monitor
medicated per md's order,+ fever - md aware,  mono spot drawn and sent, much emotional support required, questions asked and answered by family. IV NS infusing without incident. pt emelina it well.
pt and mom aware US toward the end of shift    linens and gown changed at times 'I was just sweating through'   no documented temps....IVF cont pt emelina po food/fliuds
pt resting in bed with eyes closed. no pain no distress. AM medications given.
pt sleeping at times family at bedside  Dr Tran at bedside   viral meningitis     pt comfortable, needs anticipated  IVF cont via pump RAC
pt to [facility]
report received from RN, pt resting in supine position on stretcher in a dark room  s/p lp by md, continues to c/o " everything hurts, I don't feel good" + headache and generalized body aches,
isolation d/c. pt explained plan of care. pt provided with comfort care. pt afebrile at this time. will continue to monitor.

## 2018-09-13 NOTE — DISCHARGE NOTE ADULT - PATIENT PORTAL LINK FT
You can access the [facility] Patient Portal, offered by [facility], by registering with the following website: [email or web address]

## 2018-09-13 NOTE — PROGRESS NOTE ADULT - SUBJECTIVE AND OBJECTIVE BOX
Patient is a 28y old  Female who presents with a chief complaint of headaches and fevers (12 Sep 2018 09:52)    INTERVAL/OVERNIGHT EVENTS  Patient denies any complaints at the moment.  She is re-interviewed and she reports insomnia up to 4 days prior to presentation to the ED, decreased concentration and forgetfulness.  She reports that she is sometimes short of breath and in the past she had to quit sports because she would get short of breath with physical activity.    ROS: No chest pain, palpitations, shortness of breath, nausea/vomiting, diarrhea, abdominal pain or other symptoms.    Vital Signs Last 24 Hrs  T(C): 36.7 (13 Sep 2018 07:32), Max: 37.6 (12 Sep 2018 18:27)  T(F): 98 (13 Sep 2018 07:32), Max: 99.7 (12 Sep 2018 18:27)  HR: 61 (13 Sep 2018 07:32) (61 - 97)  BP: 119/61 (13 Sep 2018 07:32) (119/61 - 148/80)  BP(mean): --  RR: 16 (13 Sep 2018 07:32) (16 - 18)  SpO2: 99% (13 Sep 2018 07:32) (99% - 100%)    General: NAD, resting comfortably in bed  HEENT: Normocephalic, atraumatic, PERRLA 2-3mm b/l, EOMI, moist mucous membranes.  Neck: no cervical lymphadenopathy, mildly diffuse enlarged thyroid without palpable nodules.  Respiratory: Normal respiratory rate and effort, CTA bilaterally, no crackles or wheezes  Cardio: Soft S1S2+, RRR, no murmurs  GI: abdomen is soft, BS+ normoactive, non-distended, non-tender  MSK: FROM in all extremities  Neuro: AAOx3, CN 2-12 grossly intact. Sensation grossly intact in all extremities, 5/5 strength in all extremities.  Skin: no rash  Psych: Normal speech and affect, good eye contact    Thyroid Stimulating Hormone, Serum: <0.10 uIU/mL (09.12.18 @ 23:03)  T4, Serum: 12.6 ug/dL (09.12.18 @ 23:03)  Triiodothyronine, Total (T3 Total): 238 ng/dL (09.12.18 @ 23:04)    < from: US Thyroid + Parathyroid (09.12.18 @ 19:12) >  FINDINGS:  No comparison studies are available for review.  The thyroid gland is diffusely enlarged with heterogeneous increased vascular parenchyma consistent with hyperthyroidism.    The right lobe measures 7.1 cm length by 3.0 cm AP by 4.1 cm transverse.    The left lobe measures 6.8 cm length by 2.6 cm AP by 2.3 cm transverse.     The thyroid isthmus measures 0.98 cm. Isthmus is enlarged heterogeneous and vascular  No mass identified..  No parathyroid bed mass is seen.    IMPRESSION: Diffusely enlarged heterogeneous hypervascular thyroid gland consistent with hyperthyroidism.  < end of copied text >    MEDICATIONS  (STANDING):  methimazole 10 milliGRAM(s) Oral <User Schedule>  multiple electrolytes Injection Type 1 1000 milliLiter(s) (125 mL/Hr) IV Continuous <Continuous>  saccharomyces boulardii 250 milliGRAM(s) Oral two times a day    MEDICATIONS  (PRN):  acetaminophen   Tablet .. 650 milliGRAM(s) Oral every 6 hours PRN Temp greater or equal to 38C (100.4F), Mild Pain (1 - 3)  ketorolac   Injectable 30 milliGRAM(s) IV Push every 8 hours PRN Severe Pain (7 - 10)  naproxen 500 milliGRAM(s) Oral two times a day PRN Moderate Pain (4 - 6)  ondansetron Injectable 4 milliGRAM(s) IV Push every 8 hours PRN Nausea and/or Vomiting Patient is a 28y old  Female who presents with a chief complaint of headaches and fevers (12 Sep 2018 09:52)    INTERVAL/OVERNIGHT EVENTS  Patient denies any complaints at the moment.  She is re-interviewed and she reports insomnia up to 4 days prior to presentation to the ED, decreased concentration and forgetfulness.  She reports that she is sometimes short of breath and in the past she had to quit sports because she would get short of breath with physical activity.  She states fatigue today and mild headache     ROS: No chest pain, palpitations, shortness of breath, nausea/vomiting, diarrhea, abdominal pain or other symptoms.   All other ros negative     Vital Signs Last 24 Hrs  T(C): 36.7 (13 Sep 2018 07:32), Max: 37.6 (12 Sep 2018 18:27)  T(F): 98 (13 Sep 2018 07:32), Max: 99.7 (12 Sep 2018 18:27)  HR: 61 (13 Sep 2018 07:32) (61 - 97)  BP: 119/61 (13 Sep 2018 07:32) (119/61 - 148/80)  RR: 16 (13 Sep 2018 07:32) (16 - 18)  SpO2: 99% (13 Sep 2018 07:32) (99% - 100%)    General: NAD, resting comfortably in bed  HEENT: Normocephalic, atraumatic, PERRLA 2-3mm b/l, EOMI, moist mucous membranes.  Neck: no cervical lymphadenopathy, mildly diffuse enlarged thyroid without palpable nodules.  Respiratory: Normal respiratory rate and effort, CTA bilaterally, no crackles or wheezes  Cardio: Soft S1S2+, RRR, no murmurs  GI: abdomen is soft, BS+ normoactive, non-distended, non-tender  MSK: FROM in all extremities  Neuro: AAOx3, CN 2-12 grossly intact. Sensation grossly intact in all extremities, 5/5 strength in all extremities.  Skin: no rash  Psych: Normal speech and affect, good eye contact    Thyroid Stimulating Hormone, Serum: <0.10 uIU/mL (09.12.18 @ 23:03)  T4, Serum: 12.6 ug/dL (09.12.18 @ 23:03)  Triiodothyronine, Total (T3 Total): 238 ng/dL (09.12.18 @ 23:04)      LABS:                         10.7   4.2   )-----------( 135      ( 13 Sep 2018 05:21 )             32.0   09-12    139  |  107  |  8.0  ----------------------------<  86  3.7   |  21.0<L>  |  0.23<L>    Ca    8.6      12 Sep 2018 08:40  Phos  4.2     09-12  Mg     2.0     09-12    TPro  7.0  /  Alb  3.4  /  TBili  0.2<L>  /  DBili  x   /  AST  33<H>  /  ALT  32  /  AlkPhos  96  09-11    < from: US Thyroid + Parathyroid (09.12.18 @ 19:12) >  FINDINGS:  No comparison studies are available for review.  The thyroid gland is diffusely enlarged with heterogeneous increased vascular parenchyma consistent with hyperthyroidism.    The right lobe measures 7.1 cm length by 3.0 cm AP by 4.1 cm transverse.    The left lobe measures 6.8 cm length by 2.6 cm AP by 2.3 cm transverse.     The thyroid isthmus measures 0.98 cm. Isthmus is enlarged heterogeneous and vascular  No mass identified..  No parathyroid bed mass is seen.    IMPRESSION: Diffusely enlarged heterogeneous hypervascular thyroid gland consistent with hyperthyroidism.  < end of copied text >    MEDICATIONS  (STANDING):  methimazole 10 milliGRAM(s) Oral <User Schedule>  multiple electrolytes Injection Type 1 1000 milliLiter(s) (125 mL/Hr) IV Continuous <Continuous>  saccharomyces boulardii 250 milliGRAM(s) Oral two times a day    MEDICATIONS  (PRN):  acetaminophen   Tablet .. 650 milliGRAM(s) Oral every 6 hours PRN Temp greater or equal to 38C (100.4F), Mild Pain (1 - 3)  ketorolac   Injectable 30 milliGRAM(s) IV Push every 8 hours PRN Severe Pain (7 - 10)  naproxen 500 milliGRAM(s) Oral two times a day PRN Moderate Pain (4 - 6)  ondansetron Injectable 4 milliGRAM(s) IV Push every 8 hours PRN Nausea and/or Vomiting

## 2018-09-14 LAB
R RICKETTSI AB SER-ACNC: NEGATIVE — SIGNIFICANT CHANGE UP
R RICKETTSI IGM SER-ACNC: 0.41 INDEX — SIGNIFICANT CHANGE UP (ref 0–0.89)
RICK SF IGG TITR SER IF: NEGATIVE — SIGNIFICANT CHANGE UP
RICK SF IGM TITR SER IF: 0.41 INDEX — SIGNIFICANT CHANGE UP (ref 0–0.89)
TSI ACT/NOR SER: 1.98 IU/L — HIGH (ref 0–0.55)

## 2018-09-15 LAB
CULTURE RESULTS: SIGNIFICANT CHANGE UP
SPECIMEN SOURCE: SIGNIFICANT CHANGE UP

## 2018-09-17 LAB
B BURGDOR DNA SPEC QL NAA+PROBE: NEGATIVE — SIGNIFICANT CHANGE UP
CULTURE RESULTS: SIGNIFICANT CHANGE UP
CULTURE RESULTS: SIGNIFICANT CHANGE UP
SPECIMEN SOURCE: SIGNIFICANT CHANGE UP
SPECIMEN SOURCE: SIGNIFICANT CHANGE UP

## 2018-10-02 ENCOUNTER — RX CHANGE (OUTPATIENT)
Age: 28
End: 2018-10-02

## 2018-10-22 ENCOUNTER — LABORATORY RESULT (OUTPATIENT)
Age: 28
End: 2018-10-22

## 2018-10-22 ENCOUNTER — APPOINTMENT (OUTPATIENT)
Dept: ENDOCRINOLOGY | Facility: CLINIC | Age: 28
End: 2018-10-22
Payer: MEDICAID

## 2018-10-22 VITALS
HEART RATE: 80 BPM | WEIGHT: 190 LBS | HEIGHT: 68 IN | BODY MASS INDEX: 28.79 KG/M2 | SYSTOLIC BLOOD PRESSURE: 110 MMHG | DIASTOLIC BLOOD PRESSURE: 70 MMHG

## 2018-10-22 DIAGNOSIS — F17.200 NICOTINE DEPENDENCE, UNSPECIFIED, UNCOMPLICATED: ICD-10-CM

## 2018-10-22 DIAGNOSIS — Z83.49 FAMILY HISTORY OF OTHER ENDOCRINE, NUTRITIONAL AND METABOLIC DISEASES: ICD-10-CM

## 2018-10-22 PROCEDURE — 99214 OFFICE O/P EST MOD 30 MIN: CPT

## 2018-10-26 ENCOUNTER — OTHER (OUTPATIENT)
Age: 28
End: 2018-10-26

## 2018-10-29 LAB
BASOPHILS # BLD AUTO: 0.01 K/UL
BASOPHILS NFR BLD AUTO: 0.1 %
EOSINOPHIL # BLD AUTO: 0.2 K/UL
EOSINOPHIL NFR BLD AUTO: 2.6 %
HCT VFR BLD CALC: 41.3 %
HGB BLD-MCNC: 14 G/DL
IMM GRANULOCYTES NFR BLD AUTO: 0.1 %
LYMPHOCYTES # BLD AUTO: 2.83 K/UL
LYMPHOCYTES NFR BLD AUTO: 36.6 %
MAN DIFF?: NORMAL
MCHC RBC-ENTMCNC: 28.2 PG
MCHC RBC-ENTMCNC: 33.9 GM/DL
MCV RBC AUTO: 83.3 FL
MONOCYTES # BLD AUTO: 0.46 K/UL
MONOCYTES NFR BLD AUTO: 5.9 %
NEUTROPHILS # BLD AUTO: 4.23 K/UL
NEUTROPHILS NFR BLD AUTO: 54.7 %
PLATELET # BLD AUTO: 289 K/UL
RBC # BLD: 4.96 M/UL
RBC # FLD: 13.7 %
WBC # FLD AUTO: 7.74 K/UL

## 2018-11-19 ENCOUNTER — APPOINTMENT (OUTPATIENT)
Dept: ENDOCRINOLOGY | Facility: CLINIC | Age: 28
End: 2018-11-19
Payer: MEDICAID

## 2018-11-19 VITALS
HEIGHT: 68 IN | WEIGHT: 189 LBS | SYSTOLIC BLOOD PRESSURE: 120 MMHG | DIASTOLIC BLOOD PRESSURE: 74 MMHG | BODY MASS INDEX: 28.64 KG/M2 | HEART RATE: 100 BPM

## 2018-11-19 PROCEDURE — 99214 OFFICE O/P EST MOD 30 MIN: CPT

## 2018-11-20 ENCOUNTER — RESULT REVIEW (OUTPATIENT)
Age: 28
End: 2018-11-20

## 2018-11-20 LAB
T3 SERPL-MCNC: 251 NG/DL
T3FREE SERPL-MCNC: 9.15 PG/ML
T4 FREE SERPL-MCNC: 3.2 NG/DL
T4 SERPL-MCNC: 11.9 UG/DL
TSH SERPL-ACNC: <0.01 UIU/ML

## 2018-11-27 ENCOUNTER — RESULT REVIEW (OUTPATIENT)
Age: 28
End: 2018-11-27

## 2018-12-02 ENCOUNTER — MOBILE ON CALL (OUTPATIENT)
Age: 28
End: 2018-12-02

## 2018-12-03 ENCOUNTER — CLINICAL ADVICE (OUTPATIENT)
Age: 28
End: 2018-12-03

## 2018-12-10 ENCOUNTER — LABORATORY RESULT (OUTPATIENT)
Age: 28
End: 2018-12-10

## 2018-12-11 ENCOUNTER — RESULT REVIEW (OUTPATIENT)
Age: 28
End: 2018-12-11

## 2018-12-12 ENCOUNTER — OTHER (OUTPATIENT)
Age: 28
End: 2018-12-12

## 2018-12-17 ENCOUNTER — APPOINTMENT (OUTPATIENT)
Dept: ENDOCRINOLOGY | Facility: CLINIC | Age: 28
End: 2018-12-17
Payer: MEDICAID

## 2018-12-17 VITALS
BODY MASS INDEX: 28.79 KG/M2 | HEART RATE: 98 BPM | HEIGHT: 68 IN | DIASTOLIC BLOOD PRESSURE: 80 MMHG | WEIGHT: 190 LBS | SYSTOLIC BLOOD PRESSURE: 130 MMHG

## 2018-12-17 PROCEDURE — 99213 OFFICE O/P EST LOW 20 MIN: CPT

## 2019-01-01 NOTE — ED ADULT NURSE NOTE - ISOLATION TYPE:
-- Message is from the [facility]--    Reason for Call: Patients mother called in returning missed call please contact.    Caller Information       Type Contact Phone    2019 02:22 PM Phone (Incoming) SARAHI COHN (Mother) [phone] (M)          Alternative phone number: na    Turnaround time given to caller:   \"This message will be sent to [state Provider's name]. The clinical team will fulfill your request as soon as they review your message.\"     None

## 2019-01-07 ENCOUNTER — APPOINTMENT (OUTPATIENT)
Dept: ENDOCRINOLOGY | Facility: CLINIC | Age: 29
End: 2019-01-07
Payer: MEDICAID

## 2019-01-07 VITALS
WEIGHT: 190 LBS | HEART RATE: 90 BPM | BODY MASS INDEX: 28.79 KG/M2 | DIASTOLIC BLOOD PRESSURE: 76 MMHG | HEIGHT: 68 IN | SYSTOLIC BLOOD PRESSURE: 140 MMHG

## 2019-01-07 PROCEDURE — 99214 OFFICE O/P EST MOD 30 MIN: CPT

## 2019-01-07 NOTE — DATA REVIEWED
[FreeTextEntry1] : Thyroid ultrasound 9/12/2018\par Right lobe: 7.1 x 3.0 x 4.1 cm\par Left lobe: 6.8 x 2.6 x 2.3 cm\par Isthmus 0.98 cm\par Impression: Diffusely enlarged heterogenous hypervascular thyroid gland consistent with hyperthyroidism

## 2019-01-07 NOTE — HISTORY OF PRESENT ILLNESS
[FreeTextEntry1] : Hospitalized in September at [facility] for meningitis. On day of discharge, noted to have goiter with suppressed TSH and + TSI.\par Patient reports neck had been large for years, but she just thought it was her normal neck.\par \par Current smoker- 1/4 to 1/2 ppd. Cut back from >1 ppd prior to hospitalization. Still trying to quit.\par No history of exposure to radiation.\par Always lived on [address]par Mother with hypothyroid- patient thinks mother may have been treated with CORDERO in the past.\par \par Current regimen:\par Methimazole 20 mg in AM and 10 mg in PM\par Propranolol 10 mg BID prn, but has not needed\par Denies dysphagia, anterior neck pain. Reports hoarseness/raspy voice has improved.\par States she saw opthalmology and was told everything is ok with her eyes.\par LMP one week ago.\par MMI was held in the past due to urticaria and rash. Rash initially resolved, but then recurred while off methimazole so therapy was restarted. Rash only appears while at work and never at night or on her days off. May be allergic to something at the [employer] where she works?\par Now with cystic acne, right side of the nose and below the eye. Reports lesion drains green pus. Also with cystic acne above left eyebrow. See dermatology today.

## 2019-01-07 NOTE — PAST MEDICAL HISTORY
[Definite ___ (Date)] : the last menstrual period was [unfilled] [Total Preg ___] : G[unfilled] [Live Births ___] : P[unfilled]

## 2019-01-07 NOTE — ASSESSMENT
[FreeTextEntry1] : 28 year old female with Grave's disease. \par -Continue Methimazole 20 mg in AM and 10 mg in PM, as rash is likely unrelated to medication.\par -Continue propranolol 10 mg BID. Advised she take dose now given tachycardia, tremor, and elevated BP.\par -Check TSH, free and total T4 and T3 now.\par -Reinforced importance of smoking cessation in setting of Grave's disease and risk of Grave's orbitopathy.\par -Reinforced importance of avoiding pregnancy while on methimazole.\par -Will need definitive treatment in the future. Patient prefers surgery (small children at home), but may need SSKI prior to procedure given vascularity of gland.\par -Will check free/total testosterone given cystic acne.

## 2019-01-07 NOTE — REVIEW OF SYSTEMS
[Fatigue] : fatigue [Blurry Vision] : blurred vision [Palpitations] : palpitations [Muscle Cramps] : muscle cramps [Acne] : acne [Hair Loss] : hair loss [Tremors] : tremors [Depression] : depression [Anxiety] : anxiety [Stress] : stress [Cold Intolerance] : cold intolerant [Recent Weight Gain (___ Lbs)] : no recent weight gain [Recent Weight Loss (___ Lbs)] : no recent weight loss [Eye Pain] : no eye pain [Dysphagia] : no dysphagia [Neck Pain] : no neck pain [Nasal Congestion] : no nasal congestion [Chest Pain] : no chest pain [Constipation] : no constipation [Irregular Menses] : regular menses [Muscle Weakness] : no muscle weakness [Heat Intolerance] : heat tolerant [FreeTextEntry3] : difficulty seeing at night [FreeTextEntry9] : cramps in B/L LE, right worse than left [de-identified] : urticaria on arms and back when at work; a little more hair loss than normal [de-identified] : was on medication in the past, but stopped when pregnant and never resumed

## 2019-01-07 NOTE — PHYSICAL EXAM
[Alert] : alert [No Acute Distress] : no acute distress [Well Nourished] : well nourished [Well Developed] : well developed [Normal Sclera/Conjunctiva] : normal sclera/conjunctiva [EOMI] : extra ocular movement intact [No Proptosis] : no proptosis [Normal Oropharynx] : the oropharynx was normal [No Thyroid Nodules] : there were no palpable thyroid nodules [No Respiratory Distress] : no respiratory distress [No Accessory Muscle Use] : no accessory muscle use [Clear to Auscultation] : lungs were clear to auscultation bilaterally [Normal Rate] : heart rate was normal  [Normal S1, S2] : normal S1 and S2 [Regular Rhythm] : with a regular rhythm [No Edema] : there was no peripheral edema [No Stigmata of Cushings Syndrome] : no stigmata of cushings syndrome [Normal Gait] : normal gait [Normal Reflexes] : deep tendon reflexes were 2+ and symmetric [Oriented x3] : oriented to person, place, and time [Normal Insight/Judgement] : insight and judgment were intact [Normal Affect] : the affect was normal [Normal Mood] : the mood was normal [de-identified] : Thyroid enlarged 2-3x normal size, no palpable nodules, no tenderness.  [de-identified] : scratches to B/L forearms [de-identified] : mild tremor

## 2019-01-09 LAB
ALBUMIN SERPL ELPH-MCNC: 4 G/DL
ALP BLD-CCNC: 96 U/L
ALT SERPL-CCNC: 11 U/L
ANION GAP SERPL CALC-SCNC: 10 MMOL/L
AST SERPL-CCNC: 16 U/L
BASOPHILS # BLD AUTO: 0.02 K/UL
BASOPHILS NFR BLD AUTO: 0.2 %
BILIRUB SERPL-MCNC: 0.4 MG/DL
BUN SERPL-MCNC: 7 MG/DL
CALCIUM SERPL-MCNC: 9.4 MG/DL
CHLORIDE SERPL-SCNC: 102 MMOL/L
CO2 SERPL-SCNC: 26 MMOL/L
CREAT SERPL-MCNC: 0.51 MG/DL
EOSINOPHIL # BLD AUTO: 0.23 K/UL
EOSINOPHIL NFR BLD AUTO: 2.6 %
GLUCOSE SERPL-MCNC: 89 MG/DL
HCT VFR BLD CALC: 40.5 %
HGB BLD-MCNC: 13.2 G/DL
IMM GRANULOCYTES NFR BLD AUTO: 0.3 %
LYMPHOCYTES # BLD AUTO: 2.53 K/UL
LYMPHOCYTES NFR BLD AUTO: 28.5 %
MAN DIFF?: NORMAL
MCHC RBC-ENTMCNC: 27.3 PG
MCHC RBC-ENTMCNC: 32.6 GM/DL
MCV RBC AUTO: 83.7 FL
MONOCYTES # BLD AUTO: 0.68 K/UL
MONOCYTES NFR BLD AUTO: 7.7 %
NEUTROPHILS # BLD AUTO: 5.38 K/UL
NEUTROPHILS NFR BLD AUTO: 60.7 %
PLATELET # BLD AUTO: 336 K/UL
POTASSIUM SERPL-SCNC: 4.4 MMOL/L
PROT SERPL-MCNC: 7.3 G/DL
RBC # BLD: 4.84 M/UL
RBC # FLD: 13.2 %
SODIUM SERPL-SCNC: 138 MMOL/L
T3 SERPL-MCNC: 172 NG/DL
T3FREE SERPL-MCNC: 4.81 PG/ML
T4 FREE SERPL-MCNC: 1.9 NG/DL
T4 SERPL-MCNC: 8.9 UG/DL
TSH SERPL-ACNC: <0.01 UIU/ML
WBC # FLD AUTO: 8.87 K/UL

## 2019-01-10 ENCOUNTER — RESULT REVIEW (OUTPATIENT)
Age: 29
End: 2019-01-10

## 2019-01-10 LAB
TESTOST BND SERPL-MCNC: 2 PG/ML
TESTOST SERPL-MCNC: 31.4 NG/DL

## 2019-01-15 ENCOUNTER — RECORD ABSTRACTING (OUTPATIENT)
Age: 29
End: 2019-01-15

## 2019-01-23 ENCOUNTER — APPOINTMENT (OUTPATIENT)
Dept: ENDOCRINOLOGY | Facility: CLINIC | Age: 29
End: 2019-01-23
Payer: MEDICAID

## 2019-01-23 VITALS
SYSTOLIC BLOOD PRESSURE: 122 MMHG | BODY MASS INDEX: 28.34 KG/M2 | HEART RATE: 110 BPM | HEIGHT: 68 IN | WEIGHT: 187 LBS | DIASTOLIC BLOOD PRESSURE: 70 MMHG

## 2019-01-23 PROCEDURE — 99214 OFFICE O/P EST MOD 30 MIN: CPT

## 2019-01-23 NOTE — REVIEW OF SYSTEMS
[Fatigue] : fatigue [Blurry Vision] : blurred vision [Nasal Congestion] : nasal congestion [Palpitations] : palpitations [Negative] : Heme/Lymph [FreeTextEntry3] : some blurry vision  has reduced smoking to a few cigarettes per day - working ion decreaing  [FreeTextEntry5] : pt odes report palpitaiton -- has only used propranolo 2 x sine inhospital  was told to use prn  [FreeTextEntry8] : has period now

## 2019-01-23 NOTE — ASSESSMENT
[FreeTextEntry1] : Graves disaese -improivng \par cont MMI 20 mgin Am and 15 mg in PM\par check labs in 2 weeks \par cosnidering surgery vs [facility]- will favor surgery as pt is smoker an d osme ophtalmopathy \par seeingophtalmology q2 month \par but need to have [facility]bale TFT \par \par   URI - will go to [facility] to get flu testing=- mild sore throat\par \par Palp - resume propranolo 10 mg bid  get EKG at [facility] in [facility]ionic \par \par \par

## 2019-01-23 NOTE — PHYSICAL EXAM
[Alert] : alert [No Acute Distress] : no acute distress [Well Nourished] : well nourished [Well Developed] : well developed [Normal Sclera/Conjunctiva] : normal sclera/conjunctiva [EOMI] : extra ocular movement intact [No Proptosis] : no proptosis [Normal Oropharynx] : the oropharynx was normal [No Respiratory Distress] : no respiratory distress [No Accessory Muscle Use] : no accessory muscle use [Clear to Auscultation] : lungs were clear to auscultation bilaterally [Normal Rate] : heart rate was normal  [Normal S1, S2] : normal S1 and S2 [Regular Rhythm] : with a regular rhythm [No Edema] : there was no peripheral edema [Post Cervical Nodes] : posterior cervical nodes [Anterior Cervical Nodes] : anterior cervical nodes [Normal] : normal and non tender [Spine Straight] : spine straight [No Stigmata of Cushings Syndrome] : no stigmata of cushings syndrome [No Rash] : no rash [Acanthosis Nigricans] : no acanthosis nigricans [Normal Reflexes] : deep tendon reflexes were 2+ and symmetric [No Tremors] : no tremors [Oriented x3] : oriented to person, place, and time [de-identified] : Bilateral increased thyrod gland  right greater than left  [de-identified] : occasional skipped beat

## 2019-01-23 NOTE — PHYSICAL EXAM
[Alert] : alert [No Acute Distress] : no acute distress [Well Nourished] : well nourished [Well Developed] : well developed [Normal Sclera/Conjunctiva] : normal sclera/conjunctiva [EOMI] : extra ocular movement intact [No Proptosis] : no proptosis [Normal Oropharynx] : the oropharynx was normal [No Respiratory Distress] : no respiratory distress [No Accessory Muscle Use] : no accessory muscle use [Clear to Auscultation] : lungs were clear to auscultation bilaterally [Normal Rate] : heart rate was normal  [Normal S1, S2] : normal S1 and S2 [Regular Rhythm] : with a regular rhythm [No Edema] : there was no peripheral edema [Post Cervical Nodes] : posterior cervical nodes [Anterior Cervical Nodes] : anterior cervical nodes [Normal] : normal and non tender [Spine Straight] : spine straight [No Stigmata of Cushings Syndrome] : no stigmata of cushings syndrome [No Rash] : no rash [Acanthosis Nigricans] : no acanthosis nigricans [Normal Reflexes] : deep tendon reflexes were 2+ and symmetric [No Tremors] : no tremors [Oriented x3] : oriented to person, place, and time [de-identified] : Bilateral increased thyrod gland  right greater than left  [de-identified] : occasional skipped beat

## 2019-01-23 NOTE — HISTORY OF PRESENT ILLNESS
[FreeTextEntry1] : Pt here follow up hyperthryoidism due to Graves disease\par Pt has been doing overall ok \par \par some plapitiaon at times \par  only taken Proparanolol 2 x \par  tired bc sick with son- 104 temp x 2-3 sdays \par  went to ER earlier today \par pt with fatigue some URI symptoms herself /nasal congesiton\par  sister recently had flu \par  feel feverish  \par \par Pt temp 98.2  but took Ibuprofen about 1 hour ago for HA \par some  lower back discomfort \par has period now \par  not trying to get preganant \par  uses barrier method \par

## 2019-01-24 ENCOUNTER — TRANSCRIPTION ENCOUNTER (OUTPATIENT)
Age: 29
End: 2019-01-24

## 2019-01-28 ENCOUNTER — NON-APPOINTMENT (OUTPATIENT)
Age: 29
End: 2019-01-28

## 2019-01-28 ENCOUNTER — APPOINTMENT (OUTPATIENT)
Dept: INTERNAL MEDICINE | Facility: CLINIC | Age: 29
End: 2019-01-28
Payer: MEDICAID

## 2019-01-28 VITALS
HEIGHT: 68 IN | SYSTOLIC BLOOD PRESSURE: 125 MMHG | WEIGHT: 188 LBS | BODY MASS INDEX: 28.49 KG/M2 | DIASTOLIC BLOOD PRESSURE: 80 MMHG | HEART RATE: 89 BPM | RESPIRATION RATE: 13 BRPM

## 2019-01-28 DIAGNOSIS — Z87.09 PERSONAL HISTORY OF OTHER DISEASES OF THE RESPIRATORY SYSTEM: ICD-10-CM

## 2019-01-28 DIAGNOSIS — Z82.0 FAMILY HISTORY OF EPILEPSY AND OTHER DISEASES OF THE NERVOUS SYSTEM: ICD-10-CM

## 2019-01-28 DIAGNOSIS — Z86.19 PERSONAL HISTORY OF OTHER INFECTIOUS AND PARASITIC DISEASES: ICD-10-CM

## 2019-01-28 DIAGNOSIS — Z86.61 PERSONAL HISTORY OF INFECTIONS OF THE CENTRAL NERVOUS SYSTEM: ICD-10-CM

## 2019-01-28 DIAGNOSIS — Z82.49 FAMILY HISTORY OF ISCHEMIC HEART DISEASE AND OTHER DISEASES OF THE CIRCULATORY SYSTEM: ICD-10-CM

## 2019-01-28 DIAGNOSIS — Z86.59 PERSONAL HISTORY OF OTHER MENTAL AND BEHAVIORAL DISORDERS: ICD-10-CM

## 2019-01-28 PROCEDURE — 36415 COLL VENOUS BLD VENIPUNCTURE: CPT

## 2019-01-28 PROCEDURE — 99406 BEHAV CHNG SMOKING 3-10 MIN: CPT

## 2019-01-28 PROCEDURE — 99385 PREV VISIT NEW AGE 18-39: CPT | Mod: 25

## 2019-01-28 RX ORDER — NAPROXEN 500 MG/1
500 TABLET ORAL
Qty: 14 | Refills: 0 | Status: DISCONTINUED | COMMUNITY
Start: 2018-09-13

## 2019-01-28 RX ORDER — ACETAMINOPHEN 325 MG/1
325 TABLET ORAL
Qty: 56 | Refills: 0 | Status: DISCONTINUED | COMMUNITY
Start: 2018-09-13

## 2019-01-28 NOTE — HEALTH RISK ASSESSMENT
[Fair] :  ~his/her~ mood as fair [] : Yes [With Significant Other] : lives with significant other [With Family] : lives with family [# of Members in Household ___] :  household currently consist of [unfilled] member(s) [Employed] : employed [College] : College [Sexually Active] : sexually active [Feels Safe at Home] : Feels safe at home [Fully functional (bathing, dressing, toileting, transferring, walking, feeding)] : Fully functional (bathing, dressing, toileting, transferring, walking, feeding) [Fully functional (using the telephone, shopping, preparing meals, housekeeping, doing laundry, using] : Fully functional and needs no help or supervision to perform IADLs (using the telephone, shopping, preparing meals, housekeeping, doing laundry, using transportation, managing medications and managing finances) [Smoke Detector] : smoke detector [Carbon Monoxide Detector] : carbon monoxide detector [Seat Belt] :  uses seat belt [Sunscreen] : uses sunscreen [Discussed at today's visit] : Advance Directives Discussed at today's visit [de-identified] : occ [Reports changes in hearing] : Reports no changes in hearing [Reports changes in vision] : Reports no changes in vision [Reports changes in dental health] : Reports no changes in dental health

## 2019-01-28 NOTE — COUNSELING
[Healthy eating counseling provided] : healthy eating [Smoking cessation counseling provided] : smoking cessation [Low Fat Diet] : Low fat diet [Decrease Portions] : Decrease food portions [Walking] : Walking [None] : None [Needs reinforcement, provided] : Patient needs reinforcement on understanding lifestyle changes and  the steps needed to achieve self management goals and reinforcement was provided [Tobacco Use Cessation Intermediate Greater Than 3 Minutes Up to 10 Minutes] : Tobacco Use Cessation Intermediate Greater Than 3 Minutes Up to 10 Minutes

## 2019-01-28 NOTE — REVIEW OF SYSTEMS
[Fatigue] : fatigue [Recent Change In Weight] : ~T recent weight change [Palpitations] : palpitations [Negative] : Heme/Lymph [Chest Pain] : no chest pain [Lower Ext Edema] : no lower extremity edema [de-identified] : Acne

## 2019-01-28 NOTE — PHYSICAL EXAM

## 2019-01-28 NOTE — ASSESSMENT
[FreeTextEntry1] : 28-year-old female relatively recently diagnosed with hyperthyroidism currently on treatment with blood work showing improvement.\par Patient is to followup with endocrinology as scheduled.\par \par Patient is a smoker at one pack a day and discussed the need for her to quit and mows discussed.\par These included tapering her own cigarettes or using nicotine replacement.\par All should be done with setting a day.\par \par Patient needs lifestyle changes with diet, exercise and weight loss.\par \par Tdap due but patient currently declines\par Influenza vaccine discussed but the patient declines feeling she doesn't want to do it right now\par \par Followup with specialists as scheduled\par \par Followup yearly and as
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
## 2019-01-29 ENCOUNTER — RESULT REVIEW (OUTPATIENT)
Age: 29
End: 2019-01-29

## 2019-01-29 LAB
APPEARANCE: CLEAR
BACTERIA: NEGATIVE
BILIRUBIN URINE: NEGATIVE
BLOOD URINE: NEGATIVE
CHOLEST SERPL-MCNC: 149 MG/DL
CHOLEST/HDLC SERPL: 2.9 RATIO
COLOR: YELLOW
GLUCOSE QUALITATIVE U: NEGATIVE MG/DL
HDLC SERPL-MCNC: 52 MG/DL
HYALINE CASTS: 5 /LPF
KETONES URINE: NEGATIVE
LDLC SERPL CALC-MCNC: 80 MG/DL
LEUKOCYTE ESTERASE URINE: NEGATIVE
MAGNESIUM SERPL-MCNC: 1.9 MG/DL
MICROSCOPIC-UA: NORMAL
NITRITE URINE: NEGATIVE
PH URINE: 5.5
PROTEIN URINE: NEGATIVE MG/DL
RED BLOOD CELLS URINE: 2 /HPF
SPECIFIC GRAVITY URINE: 1.02
SQUAMOUS EPITHELIAL CELLS: 3 /HPF
TRIGL SERPL-MCNC: 84 MG/DL
UROBILINOGEN URINE: NEGATIVE MG/DL
WHITE BLOOD CELLS URINE: 0 /HPF

## 2019-02-04 ENCOUNTER — APPOINTMENT (OUTPATIENT)
Dept: FAMILY MEDICINE | Facility: CLINIC | Age: 29
End: 2019-02-04

## 2019-02-07 ENCOUNTER — APPOINTMENT (OUTPATIENT)
Dept: FAMILY MEDICINE | Facility: CLINIC | Age: 29
End: 2019-02-07

## 2019-02-13 ENCOUNTER — EMERGENCY (EMERGENCY)
Facility: HOSPITAL | Age: 29
LOS: 1 days | Discharge: HOME | End: 2019-02-13
Attending: STUDENT IN AN ORGANIZED HEALTH CARE EDUCATION/TRAINING PROGRAM
Payer: COMMERCIAL

## 2019-02-13 VITALS
RESPIRATION RATE: 18 BRPM | HEART RATE: 85 BPM | DIASTOLIC BLOOD PRESSURE: 90 MMHG | OXYGEN SATURATION: 100 % | WEIGHT: 188.05 LBS | TEMPERATURE: 98 F | SYSTOLIC BLOOD PRESSURE: 148 MMHG | HEIGHT: 68 IN

## 2019-02-13 LAB
ALBUMIN SERPL ELPH-MCNC: 3.9 G/DL — SIGNIFICANT CHANGE UP (ref 3.3–5.2)
ALP SERPL-CCNC: 107 U/L — SIGNIFICANT CHANGE UP (ref 40–120)
ALT FLD-CCNC: 11 U/L — SIGNIFICANT CHANGE UP
ANION GAP SERPL CALC-SCNC: 10 MMOL/L — SIGNIFICANT CHANGE UP (ref 5–17)
AST SERPL-CCNC: 12 U/L — SIGNIFICANT CHANGE UP
BASOPHILS # BLD AUTO: 0 K/UL — SIGNIFICANT CHANGE UP (ref 0–0.2)
BASOPHILS NFR BLD AUTO: 0.3 % — SIGNIFICANT CHANGE UP (ref 0–2)
BILIRUB SERPL-MCNC: 0.2 MG/DL — LOW (ref 0.4–2)
BUN SERPL-MCNC: 12 MG/DL — SIGNIFICANT CHANGE UP (ref 8–20)
CALCIUM SERPL-MCNC: 8.6 MG/DL — SIGNIFICANT CHANGE UP (ref 8.6–10.2)
CHLORIDE SERPL-SCNC: 105 MMOL/L — SIGNIFICANT CHANGE UP (ref 98–107)
CO2 SERPL-SCNC: 25 MMOL/L — SIGNIFICANT CHANGE UP (ref 22–29)
CREAT SERPL-MCNC: 0.53 MG/DL — SIGNIFICANT CHANGE UP (ref 0.5–1.3)
EOSINOPHIL # BLD AUTO: 0.3 K/UL — SIGNIFICANT CHANGE UP (ref 0–0.5)
EOSINOPHIL NFR BLD AUTO: 4.2 % — SIGNIFICANT CHANGE UP (ref 0–6)
GLUCOSE SERPL-MCNC: 99 MG/DL — SIGNIFICANT CHANGE UP (ref 70–115)
HCT VFR BLD CALC: 41.2 % — SIGNIFICANT CHANGE UP (ref 37–47)
HGB BLD-MCNC: 13.7 G/DL — SIGNIFICANT CHANGE UP (ref 12–16)
LYMPHOCYTES # BLD AUTO: 2.2 K/UL — SIGNIFICANT CHANGE UP (ref 1–4.8)
LYMPHOCYTES # BLD AUTO: 34.6 % — SIGNIFICANT CHANGE UP (ref 20–55)
MCHC RBC-ENTMCNC: 28.3 PG — SIGNIFICANT CHANGE UP (ref 27–31)
MCHC RBC-ENTMCNC: 33.3 G/DL — SIGNIFICANT CHANGE UP (ref 32–36)
MCV RBC AUTO: 85.1 FL — SIGNIFICANT CHANGE UP (ref 81–99)
MONOCYTES # BLD AUTO: 0.4 K/UL — SIGNIFICANT CHANGE UP (ref 0–0.8)
MONOCYTES NFR BLD AUTO: 6.1 % — SIGNIFICANT CHANGE UP (ref 3–10)
NEUTROPHILS # BLD AUTO: 3.5 K/UL — SIGNIFICANT CHANGE UP (ref 1.8–8)
NEUTROPHILS NFR BLD AUTO: 54.6 % — SIGNIFICANT CHANGE UP (ref 37–73)
PLATELET # BLD AUTO: 295 K/UL — SIGNIFICANT CHANGE UP (ref 150–400)
POTASSIUM SERPL-MCNC: 4.8 MMOL/L — SIGNIFICANT CHANGE UP (ref 3.5–5.3)
POTASSIUM SERPL-SCNC: 4.8 MMOL/L — SIGNIFICANT CHANGE UP (ref 3.5–5.3)
PROT SERPL-MCNC: 7.1 G/DL — SIGNIFICANT CHANGE UP (ref 6.6–8.7)
RBC # BLD: 4.84 M/UL — SIGNIFICANT CHANGE UP (ref 4.4–5.2)
RBC # FLD: 14.3 % — SIGNIFICANT CHANGE UP (ref 11–15.6)
SODIUM SERPL-SCNC: 140 MMOL/L — SIGNIFICANT CHANGE UP (ref 135–145)
WBC # BLD: 6.4 K/UL — SIGNIFICANT CHANGE UP (ref 4.8–10.8)
WBC # FLD AUTO: 6.4 K/UL — SIGNIFICANT CHANGE UP (ref 4.8–10.8)

## 2019-02-13 PROCEDURE — 36415 COLL VENOUS BLD VENIPUNCTURE: CPT

## 2019-02-13 PROCEDURE — 85027 COMPLETE CBC AUTOMATED: CPT

## 2019-02-13 PROCEDURE — 80053 COMPREHEN METABOLIC PANEL: CPT

## 2019-02-13 PROCEDURE — 99284 EMERGENCY DEPT VISIT MOD MDM: CPT

## 2019-02-13 PROCEDURE — 99283 EMERGENCY DEPT VISIT LOW MDM: CPT

## 2019-02-13 RX ORDER — DIPHENHYDRAMINE HCL 50 MG
25 CAPSULE ORAL ONCE
Qty: 0 | Refills: 0 | Status: COMPLETED | OUTPATIENT
Start: 2019-02-13 | End: 2019-02-13

## 2019-02-13 RX ADMIN — Medication 25 MILLIGRAM(S): at 09:22

## 2019-02-13 NOTE — ED STATDOCS - NS ED ROS FT
No fever/chills, No photophobia/eye pain/changes in vision, No ear pain/sore throat/dysphagia, No chest pain/palpitations, no SOB/cough/wheeze/stridor, No abdominal pain, No N/V/D, no dysuria/frequency/discharge, No neck/back pain, no rash, no changes in neurological status/function. (+) allergic reaction

## 2019-02-13 NOTE — ED STATDOCS - PROGRESS NOTE DETAILS
NP NOTE:  HPI, ROS, PE of intake doctor reviewed.  Patient with outbreak of cystic acne on face, worsening over weekend with swelling under right eye.  No fevers.  On doxycycline, under the care of dermatologist.  Once labs resulted will call dermatology to get patient seen today. NP NOTE:  Labs reviewed, no evidence of systemic infection.  I made appointment with Dr. Miranda, Dr. Lindsay's partner at 1:15.

## 2019-02-13 NOTE — ED STATDOCS - OBJECTIVE STATEMENT
29 y/o F with PMHx of graves disease presents to the ED c/o eye swelling, onset this morning. pt reports acne for a month and seeing dermatologist; reports it may be a side effect of medications she is prescribed. Pt states it started with an abscess on her face. Denies SOB, difficulty swallowing, fever, change in urine. No further complaints at this time.

## 2019-02-13 NOTE — ED ADULT TRIAGE NOTE - CHIEF COMPLAINT QUOTE
pt c/o redness and swelling to face.  pt states she had injections a month ago at dermatologist office for acne.  swelling has been getting worse.  pt denies any difficulty breathing.  no angioedema noted.

## 2019-02-13 NOTE — ED STATDOCS - PHYSICAL EXAMINATION
Constitutional - well-developed; well nourished. Head - NCAT, significant outbreak of acne on the face. Mild swell inferior eye lids, otherwise normal. Airway patent. Eyes - PERRL. CV - RRR. no murmur. no edema. Pulm - CTAB. Abd - soft, nt. no rebound. no guarding. Neuro - A&Ox3. strength 5/5 x4. sensation intact x4. normal gait. Skin - No rash. MSK - normal ROM.

## 2019-02-25 ENCOUNTER — RX RENEWAL (OUTPATIENT)
Age: 29
End: 2019-02-25

## 2019-02-25 ENCOUNTER — MEDICATION RENEWAL (OUTPATIENT)
Age: 29
End: 2019-02-25

## 2019-03-05 ENCOUNTER — LABORATORY RESULT (OUTPATIENT)
Age: 29
End: 2019-03-05

## 2019-03-06 ENCOUNTER — APPOINTMENT (OUTPATIENT)
Dept: ENDOCRINOLOGY | Facility: CLINIC | Age: 29
End: 2019-03-06
Payer: MEDICAID

## 2019-03-06 VITALS
SYSTOLIC BLOOD PRESSURE: 140 MMHG | WEIGHT: 208 LBS | BODY MASS INDEX: 31.63 KG/M2 | DIASTOLIC BLOOD PRESSURE: 76 MMHG | HEIGHT: 68 IN | HEART RATE: 80 BPM

## 2019-03-06 PROCEDURE — 99214 OFFICE O/P EST MOD 30 MIN: CPT

## 2019-03-06 RX ORDER — DOXYCLYCLINE HYCLATE 150 MG/1
150 TABLET, COATED ORAL DAILY
Refills: 0 | Status: DISCONTINUED | COMMUNITY
Start: 2019-01-28 | End: 2019-03-06

## 2019-03-06 RX ORDER — DOXYCYCLINE HYCLATE 100 MG/1
100 CAPSULE ORAL
Qty: 60 | Refills: 0 | Status: DISCONTINUED | COMMUNITY
Start: 2019-01-07 | End: 2019-03-06

## 2019-03-13 PROBLEM — E05.90 THYROTOXICOSIS, UNSPECIFIED WITHOUT THYROTOXIC CRISIS OR STORM: Chronic | Status: ACTIVE | Noted: 2019-02-13

## 2019-03-13 PROBLEM — L70.9 ACNE, UNSPECIFIED: Chronic | Status: ACTIVE | Noted: 2019-02-13

## 2019-03-17 NOTE — ASSESSMENT
[FreeTextEntry1] : Graves disaese -improivng \par cont MMI take 1 tab bid 10 mg \par check labs in 2 weeks \par considerign surgery but will need tomake sure that TFT are ok \par \par seeingophtalmology q2 month \par \par low Vit D - add 50 K per week\par Palp -   better - try to taper propranolol over th e next 3-4 weeks  - willsee how she does \par  Acne- will dw derm about accutane  - may exacerbate autoimmune condition but pt with severe acne \par \par \par

## 2019-03-17 NOTE — PHYSICAL EXAM
[Alert] : alert [No Acute Distress] : no acute distress [Well Nourished] : well nourished [Well Developed] : well developed [Normal Sclera/Conjunctiva] : normal sclera/conjunctiva [EOMI] : extra ocular movement intact [No Proptosis] : no proptosis [No Respiratory Distress] : no respiratory distress [No Accessory Muscle Use] : no accessory muscle use [Clear to Auscultation] : lungs were clear to auscultation bilaterally [Normal Rate] : heart rate was normal  [Normal S1, S2] : normal S1 and S2 [Regular Rhythm] : with a regular rhythm [No Edema] : there was no peripheral edema [Post Cervical Nodes] : posterior cervical nodes [Anterior Cervical Nodes] : anterior cervical nodes [Normal] : normal and non tender [Spine Straight] : spine straight [No Stigmata of Cushings Syndrome] : no stigmata of cushings syndrome [Acne] : acne [Normal Reflexes] : deep tendon reflexes were 2+ and symmetric [No Tremors] : no tremors [Oriented x3] : oriented to person, place, and time [Acanthosis Nigricans] : no acanthosis nigricans [de-identified] : Bilateral increased thyrod gland  right greater than left  [de-identified] : occasional skipped beat  [de-identified] : severe pustular acne

## 2019-03-17 NOTE — REVIEW OF SYSTEMS
[Fatigue] : fatigue [Blurry Vision] : blurred vision [Nasal Congestion] : nasal congestion [Palpitations] : palpitations [Acne] : acne [Negative] : Heme/Lymph [FreeTextEntry3] : some blurry vision  has reduced smoking to a few cigarettes per day - working ion decreaing  [FreeTextEntry5] : pt report palpitaiton1-2 times per week  [FreeTextEntry8] : has period now  [de-identified] : severe acne

## 2019-03-17 NOTE — HISTORY OF PRESENT ILLNESS
[FreeTextEntry1] : Pt here follow up hyperthryoidism due to Graves disease\par Pt has been doing overall ok  was considerign accutane- acnes is severre and pustular \par \par  wants ot have surgery to remove thyroid gand \par wants to see Dr Webber \par \par  not trying to get preganant \par  uses barrier method \par gettingg plp 1-2 times per week

## 2019-04-02 ENCOUNTER — FORM ENCOUNTER (OUTPATIENT)
Age: 29
End: 2019-04-02

## 2019-04-03 ENCOUNTER — OUTPATIENT (OUTPATIENT)
Dept: OUTPATIENT SERVICES | Facility: HOSPITAL | Age: 29
LOS: 1 days | End: 2019-04-03
Payer: MEDICAID

## 2019-04-03 ENCOUNTER — LABORATORY RESULT (OUTPATIENT)
Age: 29
End: 2019-04-03

## 2019-04-03 ENCOUNTER — APPOINTMENT (OUTPATIENT)
Dept: ULTRASOUND IMAGING | Facility: CLINIC | Age: 29
End: 2019-04-03
Payer: MEDICAID

## 2019-04-03 ENCOUNTER — APPOINTMENT (OUTPATIENT)
Dept: ENDOCRINOLOGY | Facility: CLINIC | Age: 29
End: 2019-04-03
Payer: MEDICAID

## 2019-04-03 VITALS
HEART RATE: 72 BPM | SYSTOLIC BLOOD PRESSURE: 120 MMHG | HEIGHT: 68 IN | BODY MASS INDEX: 32.13 KG/M2 | DIASTOLIC BLOOD PRESSURE: 70 MMHG | WEIGHT: 212 LBS

## 2019-04-03 DIAGNOSIS — E05.90 THYROTOXICOSIS, UNSPECIFIED WITHOUT THYROTOXIC CRISIS OR STORM: ICD-10-CM

## 2019-04-03 PROCEDURE — 76536 US EXAM OF HEAD AND NECK: CPT

## 2019-04-03 PROCEDURE — 99214 OFFICE O/P EST MOD 30 MIN: CPT

## 2019-04-03 PROCEDURE — 76536 US EXAM OF HEAD AND NECK: CPT | Mod: 26

## 2019-04-03 RX ORDER — DAPSONE 50 MG/G
5 GEL TOPICAL
Refills: 0 | Status: DISCONTINUED | COMMUNITY
Start: 2019-01-28 | End: 2019-04-03

## 2019-04-03 NOTE — REVIEW OF SYSTEMS
[Fatigue] : fatigue [Blurry Vision] : blurred vision [Acne] : acne [Negative] : Heme/Lymph [FreeTextEntry3] : some blurry vision  has reduced smoking to a few cigarettes per day - working on decreasing  [FreeTextEntry5] : no palpitations [de-identified] : severe acne pustular

## 2019-04-03 NOTE — PHYSICAL EXAM
[Alert] : alert [No Acute Distress] : no acute distress [Well Nourished] : well nourished [Well Developed] : well developed [Normal Sclera/Conjunctiva] : normal sclera/conjunctiva [EOMI] : extra ocular movement intact [No Proptosis] : no proptosis [No Respiratory Distress] : no respiratory distress [No Accessory Muscle Use] : no accessory muscle use [Clear to Auscultation] : lungs were clear to auscultation bilaterally [Normal Rate] : heart rate was normal  [Normal S1, S2] : normal S1 and S2 [Regular Rhythm] : with a regular rhythm [No Edema] : there was no peripheral edema [Post Cervical Nodes] : posterior cervical nodes [Anterior Cervical Nodes] : anterior cervical nodes [Normal] : normal and non tender [Spine Straight] : spine straight [No Stigmata of Cushings Syndrome] : no stigmata of cushings syndrome [Acne] : acne [Acanthosis Nigricans] : no acanthosis nigricans [Normal Reflexes] : deep tendon reflexes were 2+ and symmetric [No Tremors] : no tremors [Oriented x3] : oriented to person, place, and time [de-identified] : Bilateral increased thyroid gland-  right greater than left  [de-identified] : occasional skipped beat  [de-identified] : severe pustular acne

## 2019-04-03 NOTE — HISTORY OF PRESENT ILLNESS
[FreeTextEntry1] : Pt here follow up hyperthryoidism due to Graves disease\par HAs been doing well on lower dose of propranolol- almost weaned off of  it\par \par To start Accutane- maybe next week\par  has to dw Derm again \par not trying to get pregnant- On OCP now \par to see Dr Webber tomorrow for surgical opinion on removal of thryoid \par has 3yo son at home

## 2019-04-04 ENCOUNTER — APPOINTMENT (OUTPATIENT)
Dept: SURGERY | Facility: CLINIC | Age: 29
End: 2019-04-04
Payer: MEDICAID

## 2019-04-04 VITALS
WEIGHT: 210 LBS | HEART RATE: 84 BPM | BODY MASS INDEX: 31.83 KG/M2 | HEIGHT: 68 IN | DIASTOLIC BLOOD PRESSURE: 100 MMHG | SYSTOLIC BLOOD PRESSURE: 146 MMHG

## 2019-04-04 PROCEDURE — 99204 OFFICE O/P NEW MOD 45 MIN: CPT

## 2019-04-05 ENCOUNTER — RX RENEWAL (OUTPATIENT)
Age: 29
End: 2019-04-05

## 2019-04-05 NOTE — ASSESSMENT
[FreeTextEntry1] : lengthy discussion regarding options for management including CORDERO vs total thyroidectomy. risks, benefits and alternatives discussed at length. wishes to proceed with surgery since has young child at home and does not want to be treated with CORDERO.  to be scheduled at [facility]. for LFTs and TFTs preop since pt is starting Accutane shortly

## 2019-04-05 NOTE — HISTORY OF PRESENT ILLNESS
[de-identified] : Pt  diagnosed with graves disease while in hospital for viral meningitis and now is hypothyroid. Pt denies palpitations dysphagia, hoarseness or RT exposure.  was started on methimazole at the time\par sonogram: diffusely enlarged Thyroid,  no nodules\par normal TFTs

## 2019-04-05 NOTE — PHYSICAL EXAM
[de-identified] : diffuse thyroid thickening with no discrete nodules [Laryngoscopy Performed] : laryngoscopy was performed, see procedure section for findings [Midline] : located in midline position [de-identified] : bilateral mildly enlarged, symmetrical tonsils [Normal] : orientation to person, place, and time: normal [FreeTextEntry1] : resting pulse 64 [de-identified] : indirect  laryngoscopy shows normal vocal cord mobility bilaterally with no lesions noted

## 2019-04-05 NOTE — CONSULT LETTER
[Dear  ___] : Dear  [unfilled], [Consult Letter:] : I had the pleasure of evaluating your patient, [unfilled]. [Please see my note below.] : Please see my note below. [Consult Closing:] : Thank you very much for allowing me to participate in the care of this patient.  If you have any questions, please do not hesitate to contact me. [Sincerely,] : Sincerely, [FreeTextEntry2] : Dr. Jason Aldana, Dr. Elizabeth Trejo [FreeTextEntry3] : Evan Webber MD, FACS\par System Director, Endocrine Surgery\par [facility]\par  [DrCameron  ___] : Dr. GAN

## 2019-04-05 NOTE — REASON FOR VISIT
[Initial Consultation] : an initial consultation for [Parent] : parent [FreeTextEntry2] : Graves disease

## 2019-04-25 ENCOUNTER — OUTPATIENT (OUTPATIENT)
Dept: OUTPATIENT SERVICES | Facility: HOSPITAL | Age: 29
LOS: 1 days | End: 2019-04-25

## 2019-04-25 VITALS
TEMPERATURE: 98 F | RESPIRATION RATE: 18 BRPM | HEIGHT: 68 IN | OXYGEN SATURATION: 99 % | DIASTOLIC BLOOD PRESSURE: 70 MMHG | HEART RATE: 83 BPM | SYSTOLIC BLOOD PRESSURE: 120 MMHG | WEIGHT: 218.04 LBS

## 2019-04-25 DIAGNOSIS — E05.90 THYROTOXICOSIS, UNSPECIFIED WITHOUT THYROTOXIC CRISIS OR STORM: ICD-10-CM

## 2019-04-25 LAB
ALBUMIN SERPL ELPH-MCNC: 4.2 G/DL — SIGNIFICANT CHANGE UP (ref 3.3–5)
ALP SERPL-CCNC: 99 U/L — SIGNIFICANT CHANGE UP (ref 40–120)
ALT FLD-CCNC: 13 U/L — SIGNIFICANT CHANGE UP (ref 4–33)
ANION GAP SERPL CALC-SCNC: 12 MMO/L — SIGNIFICANT CHANGE UP (ref 7–14)
AST SERPL-CCNC: 14 U/L — SIGNIFICANT CHANGE UP (ref 4–32)
BILIRUB SERPL-MCNC: < 0.2 MG/DL — LOW (ref 0.2–1.2)
BUN SERPL-MCNC: 18 MG/DL — SIGNIFICANT CHANGE UP (ref 7–23)
CALCIUM SERPL-MCNC: 9.2 MG/DL — SIGNIFICANT CHANGE UP (ref 8.4–10.5)
CHLORIDE SERPL-SCNC: 101 MMOL/L — SIGNIFICANT CHANGE UP (ref 98–107)
CO2 SERPL-SCNC: 24 MMOL/L — SIGNIFICANT CHANGE UP (ref 22–31)
CREAT SERPL-MCNC: 0.73 MG/DL — SIGNIFICANT CHANGE UP (ref 0.5–1.3)
GLUCOSE SERPL-MCNC: 80 MG/DL — SIGNIFICANT CHANGE UP (ref 70–99)
HCT VFR BLD CALC: 41 % — SIGNIFICANT CHANGE UP (ref 34.5–45)
HGB BLD-MCNC: 13.4 G/DL — SIGNIFICANT CHANGE UP (ref 11.5–15.5)
MCHC RBC-ENTMCNC: 29.4 PG — SIGNIFICANT CHANGE UP (ref 27–34)
MCHC RBC-ENTMCNC: 32.7 % — SIGNIFICANT CHANGE UP (ref 32–36)
MCV RBC AUTO: 89.9 FL — SIGNIFICANT CHANGE UP (ref 80–100)
NRBC # FLD: 0 K/UL — SIGNIFICANT CHANGE UP (ref 0–0)
PLATELET # BLD AUTO: 289 K/UL — SIGNIFICANT CHANGE UP (ref 150–400)
PMV BLD: 11.3 FL — SIGNIFICANT CHANGE UP (ref 7–13)
POTASSIUM SERPL-MCNC: 3.9 MMOL/L — SIGNIFICANT CHANGE UP (ref 3.5–5.3)
POTASSIUM SERPL-SCNC: 3.9 MMOL/L — SIGNIFICANT CHANGE UP (ref 3.5–5.3)
PROT SERPL-MCNC: 7.8 G/DL — SIGNIFICANT CHANGE UP (ref 6–8.3)
RBC # BLD: 4.56 M/UL — SIGNIFICANT CHANGE UP (ref 3.8–5.2)
RBC # FLD: 14 % — SIGNIFICANT CHANGE UP (ref 10.3–14.5)
SODIUM SERPL-SCNC: 137 MMOL/L — SIGNIFICANT CHANGE UP (ref 135–145)
T3 SERPL-MCNC: 113.8 NG/DL — SIGNIFICANT CHANGE UP (ref 80–200)
T4 FREE SERPL-MCNC: 0.97 NG/DL — SIGNIFICANT CHANGE UP (ref 0.9–1.8)
TSH SERPL-MCNC: 1.94 UIU/ML — SIGNIFICANT CHANGE UP (ref 0.27–4.2)
WBC # BLD: 11.46 K/UL — HIGH (ref 3.8–10.5)
WBC # FLD AUTO: 11.46 K/UL — HIGH (ref 3.8–10.5)

## 2019-04-25 RX ORDER — SODIUM CHLORIDE 9 MG/ML
3 INJECTION INTRAMUSCULAR; INTRAVENOUS; SUBCUTANEOUS EVERY 8 HOURS
Qty: 0 | Refills: 0 | Status: DISCONTINUED | OUTPATIENT
Start: 2019-05-08 | End: 2019-05-09

## 2019-04-25 RX ORDER — PROPRANOLOL HCL 160 MG
1 CAPSULE, EXTENDED RELEASE 24HR ORAL
Qty: 0 | Refills: 0 | COMMUNITY

## 2019-04-25 RX ORDER — SODIUM CHLORIDE 9 MG/ML
1000 INJECTION, SOLUTION INTRAVENOUS
Qty: 0 | Refills: 0 | Status: DISCONTINUED | OUTPATIENT
Start: 2019-05-08 | End: 2019-05-09

## 2019-04-25 RX ORDER — METHIMAZOLE 10 MG/1
0 TABLET ORAL
Qty: 0 | Refills: 0 | COMMUNITY

## 2019-04-25 NOTE — H&P PST ADULT - NSICDXPASTMEDICALHX_GEN_ALL_CORE_FT
PAST MEDICAL HISTORY:  Acne     Anxiety     Depressed     Hyperthyroidism     Meningitis, viral 09/2018

## 2019-04-25 NOTE — H&P PST ADULT - NEGATIVE MUSCULOSKELETAL SYMPTOMS
no back pain/no leg pain R/no joint swelling/no muscle cramps/no stiffness/no neck pain/no muscle weakness/no leg pain L

## 2019-04-25 NOTE — H&P PST ADULT - RS GEN PE MLT RESP DETAILS PC
no wheezes/good air movement/no rhonchi/clear to auscultation bilaterally/normal/airway patent/no rales/breath sounds equal/respirations non-labored/no chest wall tenderness/no intercostal retractions

## 2019-04-25 NOTE — H&P PST ADULT - NEGATIVE NEUROLOGICAL SYMPTOMS
no confusion/no difficulty walking/no loss of consciousness/no generalized seizures/no vertigo/no tremors/no loss of sensation/no headache/no weakness/no paresthesias

## 2019-04-25 NOTE — H&P PST ADULT - NSANTHOSAYNRD_GEN_A_CORE
No. ERNESTINE screening performed.  STOP BANG Legend: 0-2 = LOW Risk; 3-4 = INTERMEDIATE Risk; 5-8 = HIGH Risk

## 2019-04-25 NOTE — H&P PST ADULT - NSICDXFAMILYHX_GEN_ALL_CORE_FT
FAMILY HISTORY:  No pertinent family history in first degree relatives FAMILY HISTORY:  Family history of hypothyroidism, mother  FH: HTN (hypertension), father

## 2019-04-25 NOTE — H&P PST ADULT - NEGATIVE BREAST SYMPTOMS
no breast lump R/no nipple discharge R/no breast tenderness L/no breast tenderness R/no breast lump L/no nipple discharge L

## 2019-04-25 NOTE — H&P PST ADULT - ASSESSMENT
27 y/o female presents with pre op diagnosis: Thyrotoxicosis unspecified without thyrotoxic crisis or storm

## 2019-04-25 NOTE — H&P PST ADULT - NEGATIVE GENERAL SYMPTOMS
no anorexia/no weight loss/no polyphagia/no weight gain/no polydipsia/no fever/no chills/no sweating

## 2019-04-25 NOTE — H&P PST ADULT - HISTORY OF PRESENT ILLNESS
27 y/o female presents to PST 29 y/o  female with PMH: Hyperthyroidism presents to PST for pre op evaluation with pre op diagnosis: thyrotoxicosis unspecified without thyrotoxic crisis or storm in preparation for total thyroidectomy on 05/08/19

## 2019-04-25 NOTE — H&P PST ADULT - NEGATIVE CARDIOVASCULAR SYMPTOMS
no palpitations/no claudication/no dyspnea on exertion/no orthopnea/no chest pain/no peripheral edema/no paroxysmal nocturnal dyspnea

## 2019-04-25 NOTE — H&P PST ADULT - GASTROINTESTINAL DETAILS
no masses palpable/bowel sounds normal/soft/nontender/no distention bowel sounds normal/no rebound tenderness/no distention/no masses palpable/soft/nontender

## 2019-04-25 NOTE — H&P PST ADULT - NSICDXPROBLEM_GEN_ALL_CORE_FT
PROBLEM DIAGNOSES  Problem: Thyrotoxicosis, unspecified without thyrotoxic crisis or storm  Assessment and Plan: PROBLEM DIAGNOSES  Problem: Thyrotoxicosis, unspecified without thyrotoxic crisis or storm  Assessment and Plan: Scheduled for total thyroidectomy on 05/08/19. Pre op instructions, famotidine, chlorhexidine gluconate soap given and explained. Pt instructed to bring urine specimen on day of surgery, container given to pt who verbalized understanding.

## 2019-04-26 PROBLEM — A87.9 VIRAL MENINGITIS, UNSPECIFIED: Chronic | Status: ACTIVE | Noted: 2019-04-25

## 2019-05-07 ENCOUNTER — TRANSCRIPTION ENCOUNTER (OUTPATIENT)
Age: 29
End: 2019-05-07

## 2019-05-08 ENCOUNTER — INPATIENT (INPATIENT)
Facility: HOSPITAL | Age: 29
LOS: 0 days | Discharge: HOME | End: 2019-05-09
Attending: SPECIALIST | Admitting: SPECIALIST
Payer: MEDICAID

## 2019-05-08 ENCOUNTER — OTHER (OUTPATIENT)
Age: 29
End: 2019-05-08

## 2019-05-08 ENCOUNTER — APPOINTMENT (OUTPATIENT)
Dept: SURGERY | Facility: HOSPITAL | Age: 29
End: 2019-05-08

## 2019-05-08 ENCOUNTER — TRANSCRIPTION ENCOUNTER (OUTPATIENT)
Age: 29
End: 2019-05-08

## 2019-05-08 VITALS
HEART RATE: 62 BPM | SYSTOLIC BLOOD PRESSURE: 128 MMHG | DIASTOLIC BLOOD PRESSURE: 67 MMHG | TEMPERATURE: 98 F | RESPIRATION RATE: 16 BRPM | OXYGEN SATURATION: 100 %

## 2019-05-08 VITALS
HEART RATE: 62 BPM | RESPIRATION RATE: 16 BRPM | TEMPERATURE: 98 F | WEIGHT: 220.02 LBS | SYSTOLIC BLOOD PRESSURE: 128 MMHG | DIASTOLIC BLOOD PRESSURE: 67 MMHG | HEIGHT: 68 IN

## 2019-05-08 DIAGNOSIS — E05.90 THYROTOXICOSIS, UNSPECIFIED WITHOUT THYROTOXIC CRISIS OR STORM: ICD-10-CM

## 2019-05-08 LAB — HCG UR QL: NEGATIVE — SIGNIFICANT CHANGE UP

## 2019-05-08 RX ORDER — HYDROMORPHONE HYDROCHLORIDE 2 MG/ML
0.5 INJECTION INTRAMUSCULAR; INTRAVENOUS; SUBCUTANEOUS
Qty: 0 | Refills: 0 | Status: DISCONTINUED | OUTPATIENT
Start: 2019-05-08 | End: 2019-05-09

## 2019-05-08 RX ORDER — ONDANSETRON 8 MG/1
4 TABLET, FILM COATED ORAL ONCE
Qty: 0 | Refills: 0 | Status: DISCONTINUED | OUTPATIENT
Start: 2019-05-08 | End: 2019-05-09

## 2019-05-08 RX ORDER — CALCIUM CARBONATE 500(1250)
1 TABLET ORAL
Qty: 0 | Refills: 0 | DISCHARGE
Start: 2019-05-08

## 2019-05-08 RX ORDER — ERGOCALCIFEROL 1.25 MG/1
1 CAPSULE ORAL
Qty: 0 | Refills: 0 | COMMUNITY

## 2019-05-08 RX ORDER — ACETAMINOPHEN 500 MG
2 TABLET ORAL
Qty: 0 | Refills: 0 | COMMUNITY
Start: 2019-05-08

## 2019-05-08 RX ORDER — CALCIUM GLUCONATE 100 MG/ML
1 VIAL (ML) INTRAVENOUS ONCE
Qty: 0 | Refills: 0 | Status: COMPLETED | OUTPATIENT
Start: 2019-05-08 | End: 2019-05-08

## 2019-05-08 RX ORDER — MINOCYCLINE HYDROCHLORIDE 45 MG/1
1 TABLET, EXTENDED RELEASE ORAL
Qty: 0 | Refills: 0 | COMMUNITY

## 2019-05-08 RX ORDER — OXYCODONE AND ACETAMINOPHEN 5; 325 MG/1; MG/1
1 TABLET ORAL EVERY 4 HOURS
Qty: 0 | Refills: 0 | Status: DISCONTINUED | OUTPATIENT
Start: 2019-05-08 | End: 2019-05-09

## 2019-05-08 RX ORDER — CALCIUM CARBONATE 500(1250)
1 TABLET ORAL
Qty: 0 | Refills: 0 | Status: DISCONTINUED | OUTPATIENT
Start: 2019-05-08 | End: 2019-05-09

## 2019-05-08 RX ORDER — METHIMAZOLE 10 MG/1
1 TABLET ORAL
Qty: 0 | Refills: 0 | COMMUNITY

## 2019-05-08 RX ORDER — ACETAMINOPHEN 500 MG
650 TABLET ORAL EVERY 6 HOURS
Qty: 0 | Refills: 0 | Status: DISCONTINUED | OUTPATIENT
Start: 2019-05-08 | End: 2019-05-09

## 2019-05-08 RX ADMIN — Medication 1 TABLET(S): at 23:53

## 2019-05-08 RX ADMIN — HYDROMORPHONE HYDROCHLORIDE 0.5 MILLIGRAM(S): 2 INJECTION INTRAMUSCULAR; INTRAVENOUS; SUBCUTANEOUS at 18:11

## 2019-05-08 RX ADMIN — HYDROMORPHONE HYDROCHLORIDE 0.5 MILLIGRAM(S): 2 INJECTION INTRAMUSCULAR; INTRAVENOUS; SUBCUTANEOUS at 20:55

## 2019-05-08 RX ADMIN — Medication 1 TABLET(S): at 19:25

## 2019-05-08 RX ADMIN — HYDROMORPHONE HYDROCHLORIDE 0.5 MILLIGRAM(S): 2 INJECTION INTRAMUSCULAR; INTRAVENOUS; SUBCUTANEOUS at 20:20

## 2019-05-08 RX ADMIN — Medication 200 GRAM(S): at 19:25

## 2019-05-08 RX ADMIN — HYDROMORPHONE HYDROCHLORIDE 0.5 MILLIGRAM(S): 2 INJECTION INTRAMUSCULAR; INTRAVENOUS; SUBCUTANEOUS at 18:38

## 2019-05-08 RX ADMIN — SODIUM CHLORIDE 30 MILLILITER(S): 9 INJECTION, SOLUTION INTRAVENOUS at 13:39

## 2019-05-08 RX ADMIN — SODIUM CHLORIDE 3 MILLILITER(S): 9 INJECTION INTRAMUSCULAR; INTRAVENOUS; SUBCUTANEOUS at 22:00

## 2019-05-08 NOTE — DISCHARGE NOTE PROVIDER - CARE PROVIDER_API CALL
Evan Webber)  Plastic Surgery; Surgery  [address], [address]  [place] [address]  Phone: [phone]  Fax: [phone]  Follow Up Time:

## 2019-05-08 NOTE — PROGRESS NOTE ADULT - SUBJECTIVE AND OBJECTIVE BOX
Surgery POST-OP CHECK NOTE:    Procedure: Total thyroidectomy    Subjective: Resting comfortably in bed. Pain controlled. No N/V, CP, or SOB.    Vital Signs Last 24 Hrs  T(C): 36.7 (08 May 2019 18:00), Max: 36.7 (08 May 2019 18:00)  T(F): 98.1 (08 May 2019 18:00), Max: 98.1 (08 May 2019 18:00)  HR: 56 (08 May 2019 20:45) (56 - 70)  BP: 130/72 (08 May 2019 20:45) (121/58 - 138/73)  BP(mean): 85 (08 May 2019 20:45) (70 - 88)  RR: 17 (08 May 2019 20:45) (12 - 27)  SpO2: 93% (08 May 2019 20:45) (93% - 100%)  I&O's Summary               PHYSICAL EXAM:  Gen: NAD, well-developed  HEENT: mid anterior transverse neck incision w/ steri strips c/d/i, DIONI serosang  Neuro: AAOX3, PERRL, CNII-XII grossly intact  CV: Pulse regularly present  Pulm: normal respiratory effort  GI: abd soft, nontender, nondistended  Ext: 2+ pulses throughout Surgery POST-OP CHECK NOTE:    Procedure: Total thyroidectomy    Subjective: Resting comfortably in bed. Pain controlled. C/o mild nausea. No emesis, CP, or SOB.    Vital Signs Last 24 Hrs  T(C): 36.7 (08 May 2019 18:00), Max: 36.7 (08 May 2019 18:00)  T(F): 98.1 (08 May 2019 18:00), Max: 98.1 (08 May 2019 18:00)  HR: 56 (08 May 2019 20:45) (56 - 70)  BP: 130/72 (08 May 2019 20:45) (121/58 - 138/73)  BP(mean): 85 (08 May 2019 20:45) (70 - 88)  RR: 17 (08 May 2019 20:45) (12 - 27)  SpO2: 93% (08 May 2019 20:45) (93% - 100%)  I&O's Summary               PHYSICAL EXAM:  Gen: NAD, well-developed  HEENT: mid anterior transverse neck incision w/ steri strips c/d/i, DIONI serosang  Neuro: AAOX3, PERRL, CNII-XII grossly intact  CV: Pulse regularly present  Pulm: normal respiratory effort  GI: abd soft, nontender, nondistended  Ext: 2+ pulses throughout

## 2019-05-09 ENCOUNTER — TRANSCRIPTION ENCOUNTER (OUTPATIENT)
Age: 29
End: 2019-05-09

## 2019-05-09 ENCOUNTER — RESULT REVIEW (OUTPATIENT)
Age: 29
End: 2019-05-09

## 2019-05-09 ENCOUNTER — INBOUND DOCUMENT (OUTPATIENT)
Age: 29
End: 2019-05-09

## 2019-05-09 VITALS
OXYGEN SATURATION: 98 % | DIASTOLIC BLOOD PRESSURE: 80 MMHG | TEMPERATURE: 98 F | HEART RATE: 73 BPM | RESPIRATION RATE: 18 BRPM | SYSTOLIC BLOOD PRESSURE: 121 MMHG

## 2019-05-09 LAB
CALCIUM SERPL-MCNC: 7.5 MG/DL — LOW (ref 8.4–10.5)
CALCIUM SERPL-MCNC: 8.1 MG/DL — LOW (ref 8.4–10.5)

## 2019-05-09 PROCEDURE — 88307 TISSUE EXAM BY PATHOLOGIST: CPT | Mod: 26

## 2019-05-09 RX ORDER — CALCIUM CARBONATE 500(1250)
2 TABLET ORAL EVERY 4 HOURS
Refills: 0 | Status: DISCONTINUED | OUTPATIENT
Start: 2019-05-09 | End: 2019-05-09

## 2019-05-09 RX ORDER — CALCIUM CARBONATE 500(1250)
2 TABLET ORAL
Qty: 168 | Refills: 0
Start: 2019-05-09 | End: 2019-05-22

## 2019-05-09 RX ADMIN — Medication 1 TABLET(S): at 05:14

## 2019-05-09 RX ADMIN — OXYCODONE AND ACETAMINOPHEN 1 TABLET(S): 5; 325 TABLET ORAL at 09:02

## 2019-05-09 RX ADMIN — OXYCODONE AND ACETAMINOPHEN 1 TABLET(S): 5; 325 TABLET ORAL at 00:01

## 2019-05-09 RX ADMIN — Medication 2 TABLET(S): at 07:37

## 2019-05-09 RX ADMIN — OXYCODONE AND ACETAMINOPHEN 1 TABLET(S): 5; 325 TABLET ORAL at 09:36

## 2019-05-09 RX ADMIN — SODIUM CHLORIDE 3 MILLILITER(S): 9 INJECTION INTRAMUSCULAR; INTRAVENOUS; SUBCUTANEOUS at 05:15

## 2019-05-09 RX ADMIN — OXYCODONE AND ACETAMINOPHEN 1 TABLET(S): 5; 325 TABLET ORAL at 05:14

## 2019-05-09 RX ADMIN — OXYCODONE AND ACETAMINOPHEN 1 TABLET(S): 5; 325 TABLET ORAL at 00:30

## 2019-05-09 RX ADMIN — Medication 2 TABLET(S): at 11:38

## 2019-05-09 RX ADMIN — OXYCODONE AND ACETAMINOPHEN 1 TABLET(S): 5; 325 TABLET ORAL at 06:00

## 2019-05-09 NOTE — DISCHARGE NOTE NURSING/CASE MANAGEMENT/SOCIAL WORK - NSDCPEWEB_GEN_ALL_CORE
[facility] for Tobacco Control website --- [email or web address] website --- [email or web address]

## 2019-05-09 NOTE — PROGRESS NOTE ADULT - ASSESSMENT
27yo F with h/o hyperthyroidism and preop diagnosis of thyrotoxicosis presented 5/8 and underwent planned total thyroidectomy.    Plan  - Pain control: Tylenol 650mg PO q6h PRN, Percocet 5/325mg 1 tab PO q4h PRN  - Diet: advance as tolerated  - Monitor incision site for sign sof bleeding hematoma  - Monitor DIONI drain output      - likely drain removal 5/9  - Dispo: likely d/c home 5/9
29yo F with h/o hyperthyroidism and preop diagnosis of thyrotoxicosis presented 5/8 and underwent planned total thyroidectomy.    Plan  - Pain control: Tylenol 650mg PO q6h PRN, Percocet 5/325mg 1 tab PO q4h PRN  - Diet: advance as tolerated  - Monitor incision site for signs of bleeding or hematoma  - Monitor DIONI drain output      - likely drain removal 5/9  - Dispo: likely d/c home 5/9

## 2019-05-09 NOTE — DISCHARGE NOTE NURSING/CASE MANAGEMENT/SOCIAL WORK - NSDCPEEMAIL_GEN_ALL_CORE
[facility] for Tobacco Control email [email or web address]

## 2019-05-09 NOTE — PROGRESS NOTE ADULT - SUBJECTIVE AND OBJECTIVE BOX
C-Team Surgery Progress Note     Subjective/24hour Events: Underwent total thyroidectomy yesterday. Pain controlled. Tolerating diet. No N/V. No CP or SOB.    Vital Signs:  Vital Signs Last 24 Hrs  T(C): 37 (09 May 2019 02:20), Max: 37 (09 May 2019 02:20)  T(F): 98.6 (09 May 2019 02:20), Max: 98.6 (09 May 2019 02:20)  HR: 65 (09 May 2019 02:20) (56 - 70)  BP: 137/86 (09 May 2019 02:20) (120/77 - 138/73)  BP(mean): 85 (08 May 2019 20:45) (70 - 88)  RR: 18 (09 May 2019 02:20) (12 - 27)  SpO2: 97% (09 May 2019 02:20) (93% - 100%)    CAPILLARY BLOOD GLUCOSE          I&O's Detail    08 May 2019 07:01  -  09 May 2019 03:10  --------------------------------------------------------  IN:    lactated ringers.: 50 mL    Oral Fluid: 350 mL  Total IN: 400 mL    OUT:    Bulb: 30 mL    Voided: 750 mL  Total OUT: 780 mL    Total NET: -380 mL            MEDICATIONS  (STANDING):  calcium carbonate    500 mG (Tums) Chewable 1 Tablet(s) Chew four times a day  lactated ringers. 1000 milliLiter(s) (50 mL/Hr) IV Continuous <Continuous>  sodium chloride 0.9% lock flush 3 milliLiter(s) IV Push every 8 hours    MEDICATIONS  (PRN):  acetaminophen   Tablet .. 650 milliGRAM(s) Oral every 6 hours PRN Moderate Pain (4 - 6)  HYDROmorphone  Injectable 0.5 milliGRAM(s) IV Push every 10 minutes PRN Moderate Pain (4 - 6)  ondansetron Injectable 4 milliGRAM(s) IV Push once PRN Nausea and/or Vomiting  oxyCODONE    5 mG/acetaminophen 325 mG 1 Tablet(s) Oral every 4 hours PRN Severe Pain (7 - 10)        Physical Exam:  Gen: NAD  HEENT: mid anterior transverse neck incision w/ steri strips c/d/i, DIONI serosang  LS: nml respiratory effort  Card: pulse regularly present  GI: abd soft, nontender  Ext: warm      Labs:      Ca    8.1<L>      08 May 2019 22:23

## 2019-05-09 NOTE — PROGRESS NOTE ADULT - SUBJECTIVE AND OBJECTIVE BOX
1 day s/p total thyroidectomy. afebrile. now taking adequate po.  no collections.   slight chvostek's sign.  calcium slightly decreased. DIONI removed.  discharge home on increased calcium.  f/u in office

## 2019-05-09 NOTE — DISCHARGE NOTE NURSING/CASE MANAGEMENT/SOCIAL WORK - NSDCDPATPORTLINK_GEN_ALL_CORE
You can access the [facility] Patient Portal, offered by [facility], by registering with the following website: [email or web address]

## 2019-05-13 ENCOUNTER — RESULT REVIEW (OUTPATIENT)
Age: 29
End: 2019-05-13

## 2019-05-13 LAB — SURGICAL PATHOLOGY STUDY: SIGNIFICANT CHANGE UP

## 2019-05-22 ENCOUNTER — LABORATORY RESULT (OUTPATIENT)
Age: 29
End: 2019-05-22

## 2019-05-22 ENCOUNTER — CLINICAL ADVICE (OUTPATIENT)
Age: 29
End: 2019-05-22

## 2019-05-22 ENCOUNTER — APPOINTMENT (OUTPATIENT)
Dept: ENDOCRINOLOGY | Facility: CLINIC | Age: 29
End: 2019-05-22
Payer: MEDICAID

## 2019-05-22 VITALS
WEIGHT: 224 LBS | DIASTOLIC BLOOD PRESSURE: 74 MMHG | HEART RATE: 75 BPM | HEIGHT: 68 IN | BODY MASS INDEX: 33.95 KG/M2 | SYSTOLIC BLOOD PRESSURE: 114 MMHG

## 2019-05-22 PROCEDURE — 99214 OFFICE O/P EST MOD 30 MIN: CPT

## 2019-05-22 RX ORDER — PROPRANOLOL HYDROCHLORIDE 10 MG/1
10 TABLET ORAL
Qty: 60 | Refills: 1 | Status: DISCONTINUED | COMMUNITY
Start: 2018-11-19 | End: 2019-05-22

## 2019-05-22 RX ORDER — POTASSIUM IODIDE 1 G/ML
1 SOLUTION ORAL
Qty: 1 | Refills: 0 | Status: DISCONTINUED | COMMUNITY
Start: 2019-05-03 | End: 2019-05-22

## 2019-05-22 RX ORDER — MINOCYCLINE HYDROCHLORIDE 100 MG/1
100 CAPSULE ORAL
Qty: 60 | Refills: 0 | Status: DISCONTINUED | COMMUNITY
Start: 2019-02-13 | End: 2019-05-22

## 2019-05-22 RX ORDER — LEVONORGESTREL AND ETHINYL ESTRADIOL 0.15-0.03
0.15-3 KIT ORAL
Qty: 28 | Refills: 0 | Status: DISCONTINUED | COMMUNITY
Start: 2019-02-13 | End: 2019-05-22

## 2019-05-22 RX ORDER — METHIMAZOLE 10 MG/1
10 TABLET ORAL
Qty: 360 | Refills: 1 | Status: DISCONTINUED | COMMUNITY
Start: 2018-10-02 | End: 2019-05-22

## 2019-05-22 NOTE — REVIEW OF SYSTEMS
[Fatigue] : fatigue [Acne] : acne [Negative] : Heme/Lymph [FreeTextEntry5] : no palpitations [de-identified] : acne imroving

## 2019-05-22 NOTE — HISTORY OF PRESENT ILLNESS
[FreeTextEntry1] : Pt here follow up post op total thryoidectomy  Graves disease\par pathology cw lymphocytic thryoiditis \par off OCP \par never started Accutane\par \par did have hypocalcemia post op\par  was takin tums 1000 mg bid but now only taking it when feels tingling in hands  and feet

## 2019-05-22 NOTE — PHYSICAL EXAM
[Alert] : alert [No Acute Distress] : no acute distress [Well Nourished] : well nourished [Well Developed] : well developed [Normal Sclera/Conjunctiva] : normal sclera/conjunctiva [EOMI] : extra ocular movement intact [No Proptosis] : no proptosis [No Respiratory Distress] : no respiratory distress [No Accessory Muscle Use] : no accessory muscle use [Clear to Auscultation] : lungs were clear to auscultation bilaterally [Normal Rate] : heart rate was normal  [Normal S1, S2] : normal S1 and S2 [Regular Rhythm] : with a regular rhythm [No Edema] : there was no peripheral edema [Post Cervical Nodes] : posterior cervical nodes [Anterior Cervical Nodes] : anterior cervical nodes [Normal] : normal and non tender [Spine Straight] : spine straight [No Stigmata of Cushings Syndrome] : no stigmata of cushings syndrome [Acne] : acne [Normal Reflexes] : deep tendon reflexes were 2+ and symmetric [No Tremors] : no tremors [Oriented x3] : oriented to person, place, and time [Acanthosis Nigricans] : no acanthosis nigricans [de-identified] : scar present healing well [de-identified] : severe pustular acne  [de-identified] : +CHvostek - mild

## 2019-05-22 NOTE — ASSESSMENT
[FreeTextEntry1] : Graves disaese post op totalt hryoidecotmy \par  not initiated thyroid hormone  meds yet \par start Syntrhoid 0.088 mg x 2 week then 0.100 mg qd \par \par seeing ophtalmology q2 month \par hypocalcemi post oop resume tums bid BW doen today ? if needs rocaltrol \par low Vit D - Cont 50 K per week\par Acne- never did accutane \par off OCP \par watch TFT as acccutane can worse autoimmune condition , affect LFT etc \par RTC 4-6 weeks \par \par

## 2019-05-28 ENCOUNTER — APPOINTMENT (OUTPATIENT)
Dept: SURGERY | Facility: CLINIC | Age: 29
End: 2019-05-28
Payer: MEDICAID

## 2019-05-28 PROCEDURE — 36415 COLL VENOUS BLD VENIPUNCTURE: CPT

## 2019-05-28 PROCEDURE — 99024 POSTOP FOLLOW-UP VISIT: CPT

## 2019-05-29 ENCOUNTER — RESULT REVIEW (OUTPATIENT)
Age: 29
End: 2019-05-29

## 2019-05-29 LAB
25(OH)D3 SERPL-MCNC: 10.5 NG/ML
CALCIUM SERPL-MCNC: 7.4 MG/DL
CALCIUM SERPL-MCNC: 7.4 MG/DL
PARATHYROID HORMONE INTACT: 30 PG/ML

## 2019-06-04 NOTE — ASSESSMENT
[FreeTextEntry1] : s/p total Thyroidectomy\par Hypocalcemia\par bloods today\par path reviewed\par daily care\par f/u Endo\par f/u 3 months

## 2019-06-04 NOTE — HISTORY OF PRESENT ILLNESS
[de-identified] : Pt 3 weeks s/p Total Thyroidectomy occasional numbness to fingers on Synthroid 88 mcg and will be starting Synthroid 100 mcg next week also being treated for Hypocalcemia

## 2019-06-04 NOTE — PHYSICAL EXAM
[Midline] : located in midline position [Normal] : orientation to person, place, and time: normal [de-identified] : well healed scar [de-identified] : Neg Chvostek's sign

## 2019-06-07 ENCOUNTER — RESULT REVIEW (OUTPATIENT)
Age: 29
End: 2019-06-07

## 2019-06-26 ENCOUNTER — LABORATORY RESULT (OUTPATIENT)
Age: 29
End: 2019-06-26

## 2019-06-26 ENCOUNTER — APPOINTMENT (OUTPATIENT)
Dept: ENDOCRINOLOGY | Facility: CLINIC | Age: 29
End: 2019-06-26
Payer: MEDICAID

## 2019-06-26 VITALS
WEIGHT: 224 LBS | SYSTOLIC BLOOD PRESSURE: 114 MMHG | BODY MASS INDEX: 33.95 KG/M2 | HEIGHT: 68 IN | DIASTOLIC BLOOD PRESSURE: 74 MMHG | HEART RATE: 89 BPM

## 2019-06-26 PROCEDURE — 99213 OFFICE O/P EST LOW 20 MIN: CPT

## 2019-06-26 NOTE — ASSESSMENT
[FreeTextEntry1] : Graves disaese post op total thryoidecotmy \par on 0.100 mg Synthroid \par labs doen today  \par \par Hypocalcmeia postop - continue 2 calcium bid \par  cont rocaltrol \par cont VitD 50 K per week \par -labs done today and adjust\par  PTH was 30  but still relatively low as calcium at time to draw qas 7.4 \par low Vit D - Cont 50 K per week\par Acne-improving \par off OCP \par \par RTC8  weeks \par \par

## 2019-06-26 NOTE — REVIEW OF SYSTEMS
[Dysphagia] : no dysphagia [Dysphonia] : no dysphonia [Neck Pain] : no neck pain [Acne] : acne [Negative] : Heme/Lymph [FreeTextEntry4] : neck feels numb still   [FreeTextEntry5] : no palpitations [de-identified] : acne improving

## 2019-06-26 NOTE — HISTORY OF PRESENT ILLNESS
[FreeTextEntry1] : Pt here follow up post op total thryoidectomy  Graves disease\par pathology cw lymphocytic thryoiditis \par off OCP \par  started  calcitriol  0.5 mcg bid \par taking calcium 500 mg 2 tab bid\par Vit D 50 K per week \par only a few symptoms of hypocalcemia \par

## 2019-06-26 NOTE — PHYSICAL EXAM
[Alert] : alert [No Acute Distress] : no acute distress [Well Nourished] : well nourished [Well Developed] : well developed [Normal Sclera/Conjunctiva] : normal sclera/conjunctiva [EOMI] : extra ocular movement intact [No Proptosis] : no proptosis [No Respiratory Distress] : no respiratory distress [No Accessory Muscle Use] : no accessory muscle use [Clear to Auscultation] : lungs were clear to auscultation bilaterally [Normal Rate] : heart rate was normal  [Normal S1, S2] : normal S1 and S2 [Regular Rhythm] : with a regular rhythm [No Edema] : there was no peripheral edema [Post Cervical Nodes] : posterior cervical nodes [Anterior Cervical Nodes] : anterior cervical nodes [Normal] : normal and non tender [Spine Straight] : spine straight [No Stigmata of Cushings Syndrome] : no stigmata of cushings syndrome [Acne] : acne [Acanthosis Nigricans] : no acanthosis nigricans [Normal Reflexes] : deep tendon reflexes were 2+ and symmetric [No Tremors] : no tremors [Oriented x3] : oriented to person, place, and time [de-identified] : acne better  [de-identified] : scar present healing well [de-identified] : Neg Mio

## 2019-08-16 ENCOUNTER — MEDICATION RENEWAL (OUTPATIENT)
Age: 29
End: 2019-08-16

## 2019-08-16 LAB
ALBUMIN SERPL ELPH-MCNC: 4.5 G/DL
ALP BLD-CCNC: 69 U/L
ALT SERPL-CCNC: 9 U/L
ANION GAP SERPL CALC-SCNC: 12 MMOL/L
AST SERPL-CCNC: 14 U/L
BILIRUB SERPL-MCNC: <0.2 MG/DL
BUN SERPL-MCNC: 20 MG/DL
CALCIUM SERPL-MCNC: 9.2 MG/DL
CHLORIDE SERPL-SCNC: 103 MMOL/L
CO2 SERPL-SCNC: 27 MMOL/L
CREAT SERPL-MCNC: 0.94 MG/DL
GLUCOSE SERPL-MCNC: 73 MG/DL
MAGNESIUM SERPL-MCNC: 1.7 MG/DL
PHOSPHATE SERPL-MCNC: 4.4 MG/DL
POTASSIUM SERPL-SCNC: 4.4 MMOL/L
PROT SERPL-MCNC: 7.4 G/DL
SODIUM SERPL-SCNC: 142 MMOL/L
T3FREE SERPL-MCNC: 1.8 PG/ML
T4 FREE SERPL-MCNC: 0.9 NG/DL
TSH SERPL-ACNC: 29.5 UIU/ML

## 2019-08-27 ENCOUNTER — APPOINTMENT (OUTPATIENT)
Dept: SURGERY | Facility: CLINIC | Age: 29
End: 2019-08-27

## 2019-08-28 ENCOUNTER — APPOINTMENT (OUTPATIENT)
Dept: ENDOCRINOLOGY | Facility: CLINIC | Age: 29
End: 2019-08-28
Payer: MEDICAID

## 2019-08-28 VITALS
BODY MASS INDEX: 34.56 KG/M2 | SYSTOLIC BLOOD PRESSURE: 106 MMHG | HEIGHT: 68 IN | DIASTOLIC BLOOD PRESSURE: 70 MMHG | HEART RATE: 74 BPM | WEIGHT: 228 LBS

## 2019-08-28 PROCEDURE — 99213 OFFICE O/P EST LOW 20 MIN: CPT

## 2019-08-28 NOTE — HISTORY OF PRESENT ILLNESS
[FreeTextEntry1] : Pt here follow up post op total thryoidectomy  Graves disease\par  menses regular \par  started  calcitriol  0.5 mcg bid \par taking calcium 500 mg 2 tab bid\par Vit D 50 K per week \par started 0.175 mg  Syntrhoid about 1.5 weeks ago \par  \par noticing that  having trouble with speaking- voice is hoarse an tends to drop out - \par some trouble swallowing \par  was to see Dr Webber but missed appt  yesterday- \par will reschedule  to see him \par

## 2019-08-28 NOTE — PHYSICAL EXAM
[Alert] : alert [No Acute Distress] : no acute distress [Well Nourished] : well nourished [Well Developed] : well developed [Normal Sclera/Conjunctiva] : normal sclera/conjunctiva [EOMI] : extra ocular movement intact [No Proptosis] : no proptosis [No Respiratory Distress] : no respiratory distress [No Accessory Muscle Use] : no accessory muscle use [Clear to Auscultation] : lungs were clear to auscultation bilaterally [Normal Rate] : heart rate was normal  [Normal S1, S2] : normal S1 and S2 [Regular Rhythm] : with a regular rhythm [No Edema] : there was no peripheral edema [Post Cervical Nodes] : posterior cervical nodes [Anterior Cervical Nodes] : anterior cervical nodes [Normal] : normal and non tender [Spine Straight] : spine straight [No Stigmata of Cushings Syndrome] : no stigmata of cushings syndrome [Acne] : acne [Acanthosis Nigricans] : no acanthosis nigricans [Normal Reflexes] : deep tendon reflexes were 2+ and symmetric [No Tremors] : no tremors [Oriented x3] : oriented to person, place, and time [de-identified] : scar present healing well [de-identified] : acne better

## 2019-08-28 NOTE — ASSESSMENT
[FreeTextEntry1] : Graves disaese post op total thryoidecotmy \par on 0..175 mg syntrhoid- toleratign it well\par  check BW in 4 week s\par \par \par Hypocalcmeia postop - continue 2 calcium bid \par  cont rocaltrol \par cont VitD 50 K per week \par \par low Vit D - Cont 50 K per week\par Acne-improving \par off OCP \par willdw  derm if still needs accutane \par \par voice changes- will reschedule visit with DR Webber - will work on quittign smoking - went back up to 1 ppd  bc of recent stress  emphasized improtance bc of risk to eyes from Graves disease \par \par RTC 3-4 month  \par

## 2019-08-28 NOTE — REVIEW OF SYSTEMS
[As Noted in HPI] : as noted in HPI [Dysphagia] : no dysphagia [Dysphonia] : no dysphonia [Neck Pain] : no neck pain [Acne] : acne [Negative] : Heme/Lymph [FreeTextEntry4] : not sure if starting to have sore throat  [FreeTextEntry5] : no palpitations [de-identified] : acne improving

## 2019-11-03 ENCOUNTER — TRANSCRIPTION ENCOUNTER (OUTPATIENT)
Age: 29
End: 2019-11-03

## 2019-12-03 ENCOUNTER — RX RENEWAL (OUTPATIENT)
Age: 29
End: 2019-12-03

## 2020-01-08 ENCOUNTER — APPOINTMENT (OUTPATIENT)
Dept: ENDOCRINOLOGY | Facility: CLINIC | Age: 30
End: 2020-01-08
Payer: MEDICAID

## 2020-01-08 VITALS
HEART RATE: 96 BPM | DIASTOLIC BLOOD PRESSURE: 74 MMHG | HEIGHT: 68 IN | SYSTOLIC BLOOD PRESSURE: 114 MMHG | BODY MASS INDEX: 38.8 KG/M2 | WEIGHT: 256 LBS

## 2020-01-08 PROCEDURE — 99213 OFFICE O/P EST LOW 20 MIN: CPT

## 2020-01-08 NOTE — REVIEW OF SYSTEMS
[Dysphagia] : no dysphagia [Dysphonia] : no dysphonia [Neck Pain] : no neck pain [Acne] : acne [Negative] : Heme/Lymph [FreeTextEntry5] : no palpitations [de-identified] : acne improving

## 2020-01-08 NOTE — HISTORY OF PRESENT ILLNESS
[FreeTextEntry1] : Pt here follow up post op total thryoidectomy  Graves disease\par  menses regular \par on  calcitriol  0.5 mcg bid \par not taking regualr OTC calcium \par Vit D 50 K per week \par started 0.175 mg  Syntrhoid \par   been having somne trouble with weight gain- not sure why \par eats about the same \par not exercsiign \par  feelign a bit depressed -in past was taking meds in the past \par went off with pregnancy \par \par dealing with abscess in  tooth- needs root cancl   on amoxicillin now \par

## 2020-01-08 NOTE — PHYSICAL EXAM
[Alert] : alert [No Acute Distress] : no acute distress [Well Nourished] : well nourished [Well Developed] : well developed [Normal Sclera/Conjunctiva] : normal sclera/conjunctiva [EOMI] : extra ocular movement intact [No Proptosis] : no proptosis [No Respiratory Distress] : no respiratory distress [No Accessory Muscle Use] : no accessory muscle use [Clear to Auscultation] : lungs were clear to auscultation bilaterally [Normal Rate] : heart rate was normal  [Normal S1, S2] : normal S1 and S2 [Regular Rhythm] : with a regular rhythm [No Edema] : there was no peripheral edema [Post Cervical Nodes] : posterior cervical nodes [Anterior Cervical Nodes] : anterior cervical nodes [Normal] : normal and non tender [Spine Straight] : spine straight [No Stigmata of Cushings Syndrome] : no stigmata of cushings syndrome [Acne] : acne [Acanthosis Nigricans] : no acanthosis nigricans [Normal Reflexes] : deep tendon reflexes were 2+ and symmetric [No Tremors] : no tremors [Oriented x3] : oriented to person, place, and time [de-identified] : scar present healing well [de-identified] : acne better

## 2020-01-08 NOTE — ASSESSMENT
[FreeTextEntry1] : Graves disaese post op total thryoidecotmy \par on 0..175 mg syntrhoid- toleratign it well\par  check BW inow \par \par \par Hypocalcmeia postop -  has been off otc caclium \par  \par  cont rocaltrol \par cont VitD 50 K per week \par \par low Vit D - Cont 50 K per week\par Acne-improving \par off OCP \par \par \par weight gain- will check hormonal levesl \par \par RTC 3-4 month  \par

## 2020-01-16 ENCOUNTER — LABORATORY RESULT (OUTPATIENT)
Age: 30
End: 2020-01-16

## 2020-02-03 ENCOUNTER — NON-APPOINTMENT (OUTPATIENT)
Age: 30
End: 2020-02-03

## 2020-02-03 ENCOUNTER — APPOINTMENT (OUTPATIENT)
Dept: INTERNAL MEDICINE | Facility: CLINIC | Age: 30
End: 2020-02-03
Payer: MEDICAID

## 2020-02-03 VITALS
BODY MASS INDEX: 39.71 KG/M2 | DIASTOLIC BLOOD PRESSURE: 70 MMHG | HEART RATE: 87 BPM | HEIGHT: 68 IN | SYSTOLIC BLOOD PRESSURE: 115 MMHG | WEIGHT: 262 LBS | RESPIRATION RATE: 16 BRPM

## 2020-02-03 PROCEDURE — 93000 ELECTROCARDIOGRAM COMPLETE: CPT | Mod: 59

## 2020-02-03 PROCEDURE — 90674 CCIIV4 VAC NO PRSV 0.5 ML IM: CPT

## 2020-02-03 PROCEDURE — 99406 BEHAV CHNG SMOKING 3-10 MIN: CPT

## 2020-02-03 PROCEDURE — G0442 ANNUAL ALCOHOL SCREEN 15 MIN: CPT

## 2020-02-03 PROCEDURE — 99395 PREV VISIT EST AGE 18-39: CPT | Mod: 25

## 2020-02-03 PROCEDURE — G0008: CPT

## 2020-02-03 RX ORDER — POLYMYXIN B SULFATE AND TRIMETHOPRIM 10000; 1 [USP'U]/ML; MG/ML
10000-0.1 SOLUTION OPHTHALMIC
Qty: 10 | Refills: 0 | Status: DISCONTINUED | COMMUNITY
Start: 2019-11-03

## 2020-02-03 RX ORDER — LEVOTHYROXINE SODIUM 0.11 MG/1
112 TABLET ORAL
Qty: 180 | Refills: 1 | Status: DISCONTINUED | COMMUNITY
Start: 2019-12-03 | End: 2020-02-03

## 2020-02-03 NOTE — HEALTH RISK ASSESSMENT
[Fair] :  ~his/her~ mood as fair [Yes] : Yes [No falls in past year] : Patient reported no falls in the past year [With Significant Other] : lives with significant other [None] : None [With Family] : lives with family [# of Members in Household ___] :  household currently consist of [unfilled] member(s) [College] : College [Employed] : employed [Single] : single [Feels Safe at Home] : Feels safe at home [Sexually Active] : sexually active [Fully functional (using the telephone, shopping, preparing meals, housekeeping, doing laundry, using] : Fully functional and needs no help or supervision to perform IADLs (using the telephone, shopping, preparing meals, housekeeping, doing laundry, using transportation, managing medications and managing finances) [Fully functional (bathing, dressing, toileting, transferring, walking, feeding)] : Fully functional (bathing, dressing, toileting, transferring, walking, feeding) [Smoke Detector] : smoke detector [Carbon Monoxide Detector] : carbon monoxide detector [Seat Belt] :  uses seat belt [Sunscreen] : uses sunscreen [Reviewed no changes] : Reviewed no changes [Discussed at today's visit] : Advance Directives Discussed at today's visit [] : Yes [Monthly or less (1 pt)] : Monthly or less (1 point) [1 or 2 (0 pts)] : 1 or 2 (0 points) [Never (0 pts)] : Never (0 points) [No] : In the past 12 months have you used drugs other than those required for medical reasons? 

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
## 2020-02-03 NOTE — COUNSELING
[Healthy eating counseling provided] : healthy eating [Low Fat Diet] : Low fat diet [Smoking cessation counseling provided] : smoking cessation [Decrease Portions] : Decrease food portions [Walking] : Walking [None] : None [Needs reinforcement, provided] : Patient needs reinforcement on understanding lifestyle changes and  the steps needed to achieve self management goals and reinforcement was provided [Risk of tobacco use and health benefits of smoking cessation discussed] : Risk of tobacco use and health benefits of smoking cessation discussed [Use of nicotine replacement therapies and other medications discussed] : Use of nicotine replacement therapies and other medications discussed [Cessation strategies including cessation program discussed] : Cessation strategies including cessation program discussed [Encouraged to pick a quit date and identify support needed to quit] : Encouraged to pick a quit date and identify support needed to quit [Willing to Quit Smoking] : Willing to quit smoking [Tobacco Use Cessation Intermediate Greater Than 3 Minutes Up to 10 Minutes] : Tobacco Use Cessation Intermediate Greater Than 3 Minutes Up to 10 Minutes [Potential consequences of obesity discussed] : Potential consequences of obesity discussed [Benefits of weight loss discussed] : Benefits of weight loss discussed [Encouraged to maintain food diary] : Encouraged to maintain food diary [Structured Weight Management Program suggested:] : Structured weight management program suggested [Encouraged to increase physical activity] : Encouraged to increase physical activity

## 2020-02-03 NOTE — REVIEW OF SYSTEMS
[Fatigue] : fatigue [Recent Change In Weight] : ~T recent weight change [Palpitations] : palpitations [Negative] : Heme/Lymph [Chest Pain] : no chest pain [Lower Ext Edema] : no lower extremity edema [de-identified] : Acne

## 2020-02-03 NOTE — ASSESSMENT
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.\par Recent blood work by endocrinology shows the patient still hypothyroid on increased dose of levothyroxine.\par \par Patient with depression with similar history on medicine in the past but now seems mostly related to patient's significant weight gain with no desire to make lifestyle changes.\par Patient agrees that her depression is mainly rooted in her obesity\par Patient also continues to be an every day smoker.\par \par Significant lifestyle changes patient needs to make discussed in length.\par Recommend starting with a good dietary program such as Weight Watchers with regular exercise and losing weight over 2 months and then setting a date to quit smoking cigarettes.\par \par Tdap December 2019\par influenza vaccine given left deltoid\par \par Blood work done January 16, 2020 by endocrinology reviewed and all good other than hypothyroid and urine RBCs. Also vitamin D decreased with increased supplementation\par Patient is on higher dose of levothyroxine and UA will be repeated today\par \par Followup with specialists as scheduled\par \par Followup yearly and as needed

## 2020-02-03 NOTE — PHYSICAL EXAM
[No Acute Distress] : no acute distress [Well Developed] : well developed [Well Nourished] : well nourished [Normal Sclera/Conjunctiva] : normal sclera/conjunctiva [Well-Appearing] : well-appearing [PERRL] : pupils equal round and reactive to light [EOMI] : extraocular movements intact [Normal Outer Ear/Nose] : the outer ears and nose were normal in appearance [Normal Oropharynx] : the oropharynx was normal [No JVD] : no jugular venous distention [Supple] : supple [Thyroid Normal, No Nodules] : the thyroid was normal and there were no nodules present [No Lymphadenopathy] : no lymphadenopathy [Clear to Auscultation] : lungs were clear to auscultation bilaterally [No Respiratory Distress] : no respiratory distress  [No Accessory Muscle Use] : no accessory muscle use [Normal Rate] : normal rate  [Normal S1, S2] : normal S1 and S2 [Regular Rhythm] : with a regular rhythm [No Murmur] : no murmur heard [No Carotid Bruits] : no carotid bruits [No Abdominal Bruit] : a ~M bruit was not heard ~T in the abdomen [Pedal Pulses Present] : the pedal pulses are present [No Varicosities] : no varicosities [No Edema] : there was no peripheral edema [No Extremity Clubbing/Cyanosis] : no extremity clubbing/cyanosis [No Palpable Aorta] : no palpable aorta [Soft] : abdomen soft [Non Tender] : non-tender [Non-distended] : non-distended [No Masses] : no abdominal mass palpated [No HSM] : no HSM [Normal Bowel Sounds] : normal bowel sounds [Normal Posterior Cervical Nodes] : no posterior cervical lymphadenopathy [Normal Anterior Cervical Nodes] : no anterior cervical lymphadenopathy [No CVA Tenderness] : no CVA  tenderness [No Spinal Tenderness] : no spinal tenderness [No Joint Swelling] : no joint swelling [Coordination Grossly Intact] : coordination grossly intact [Normal Gait] : normal gait [Grossly Normal Strength/Tone] : grossly normal strength/tone [No Focal Deficits] : no focal deficits [Deep Tendon Reflexes (DTR)] : deep tendon reflexes were 2+ and symmetric [Normal Affect] : the affect was normal [Normal Insight/Judgement] : insight and judgment were intact [Acne] : acne [de-identified] : By GYN [de-identified] : obese [de-identified] : Mild acne-improved

## 2020-02-04 LAB
APPEARANCE: CLEAR
BACTERIA: NEGATIVE
BILIRUBIN URINE: NEGATIVE
BLOOD URINE: ABNORMAL
COLOR: NORMAL
GLUCOSE QUALITATIVE U: NEGATIVE
HYALINE CASTS: 4 /LPF
KETONES URINE: NEGATIVE
LEUKOCYTE ESTERASE URINE: ABNORMAL
MICROSCOPIC-UA: NORMAL
NITRITE URINE: NEGATIVE
PH URINE: 5.5
PROTEIN URINE: NEGATIVE
RED BLOOD CELLS URINE: 1 /HPF
SPECIFIC GRAVITY URINE: 1.02
SQUAMOUS EPITHELIAL CELLS: 5 /HPF
UROBILINOGEN URINE: NORMAL
WHITE BLOOD CELLS URINE: 12 /HPF

## 2020-03-19 ENCOUNTER — TRANSCRIPTION ENCOUNTER (OUTPATIENT)
Age: 30
End: 2020-03-19

## 2020-05-13 ENCOUNTER — APPOINTMENT (OUTPATIENT)
Dept: ENDOCRINOLOGY | Facility: CLINIC | Age: 30
End: 2020-05-13
Payer: MEDICAID

## 2020-05-13 PROCEDURE — 99213 OFFICE O/P EST LOW 20 MIN: CPT | Mod: 95

## 2020-05-19 ENCOUNTER — TRANSCRIPTION ENCOUNTER (OUTPATIENT)
Age: 30
End: 2020-05-19

## 2020-05-25 NOTE — HISTORY OF PRESENT ILLNESS
[Home] : at home, [unfilled] , at the time of the visit. [Medical Office: ([facility])___] : at the medical office located in  [FreeTextEntry1] : tiem start 356 pm \par  time end 407 pm \par Pt here follow up post op total thryoidectomy  Graves disease\par  menses regular \par on  calcitriol  0.5 mcg bid \par not taking regualr OTC calcium bene having cramps inlegs and feet\par Vit D 50 K per week \par on 0.175 mg  Syntrhoid \par \par \par

## 2020-05-25 NOTE — ASSESSMENT
[FreeTextEntry1] : Graves disaese post op total thryoidecotmy \par on 0..175 mg syntrhoid- toleratign it well\par  check BW inow \par \par \par Hypocalcmeia postop -  has been off otc caclium -resume citracal  2 caplets bid \par  \par  cont rocaltrol \par cont VitD 50 K per week \par \par low Vit D - Cont 50 K per week\par Acne-improving \par off OCP \par \par check updated lbs \par \par RTC 3-4 month  \par

## 2020-05-25 NOTE — REASON FOR VISIT
[Follow - Up] : a follow-up visit [Hyperthyroidism] : hyperthyroidism [Follow-Up: _____] : a [unfilled] follow-up visit

## 2020-06-23 NOTE — ED STATDOCS - CONSTITUTIONAL APPEARANCE HYGIENE, MLM
Kirby Casarez is a 79 y.o. female          CC:    Right breast fibroadenoma post stereotactic bx    HISTORY OF PRESENT ILLNESS:    Pt is a 80 yo female that had a stereotactic bx and it came back sclerotic fibroadenoma. She is here to discuss further observation vs excision. No other complaints.           Review of Systems:    General:  Fever: Negative  Weight Change:Negative  Night Sweats: Negative    Eye:  Blurry Vision:Negative  Double Vision: Negative    Ent:  Headaches: Negative  Sore throat: Negative    Allergy/Immunology:  Hives: Negative  Latex allergy: Negative    Hematology/Lymphatic:  Bleeding Problems: Negative  Blood Clots: Negative  Swollen Lymph Nodes: Negative    Lungs:  Cough: Negative  SOB: Negative  Wheezing:Negative    Cardiovascular:  Chest Pain: Negative  Palpitations:Negative    GI:   Decreased Appetite: Negative  Heartburn: Negative  Dysphagia: Negative  Nausea/Vomiting: Negative  Abdominal Pain: Negative  Change in Bowels:Negative  Constipation: Negative  Diarrhea: Negative  Rectal Bleeding: Negative    :   Dysuria: Negative  Increase Urinary Frequency/Urgency: Negative    Neuro:  Seizures: Negative  Confusion: Negative        PAST MEDICAL HISTORY:        Family History   Problem Relation Age of Onset    Coronary Art Dis Father     Diabetes Father     High Cholesterol Father     Other Father         colon polyp     Social History     Socioeconomic History    Marital status:      Spouse name: Not on file    Number of children: Not on file    Years of education: Not on file    Highest education level: Not on file   Occupational History    Not on file   Social Needs    Financial resource strain: Not on file    Food insecurity     Worry: Not on file     Inability: Not on file    Transportation needs     Medical: Not on file     Non-medical: Not on file   Tobacco Use    Smoking status: Former Smoker     Packs/day: 1.00     Years: 30.00     Pack years: 30.00 Types: Cigarettes     Last attempt to quit: 2014     Years since quittin.5    Smokeless tobacco: Never Used   Substance and Sexual Activity    Alcohol use: Not on file    Drug use: Not on file    Sexual activity: Not on file   Lifestyle    Physical activity     Days per week: Not on file     Minutes per session: Not on file    Stress: Not on file   Relationships    Social connections     Talks on phone: Not on file     Gets together: Not on file     Attends Spiritism service: Not on file     Active member of club or organization: Not on file     Attends meetings of clubs or organizations: Not on file     Relationship status: Not on file    Intimate partner violence     Fear of current or ex partner: Not on file     Emotionally abused: Not on file     Physically abused: Not on file     Forced sexual activity: Not on file   Other Topics Concern    Not on file   Social History Narrative    Not on file     Past Surgical History:   Procedure Laterality Date     SECTION      x 3    CHOLECYSTECTOMY      laparoscopic    COLONOSCOPY  10/2006    COLONOSCOPY      Dr Timo Quiles; tubular adenoma     COLONOSCOPY  2020    Blood adenoma- Dr Dami Dias    ERCP      with biopsy    PANCREAS SURGERY      s/p transduodenal ampullary polyp resection 6/10/19    UPPER GASTROINTESTINAL ENDOSCOPY  2020     Past Medical History:   Diagnosis Date    Food allergy     MSG, Tomatoes, Orange Juice, Spicy Food    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 ([facility] [address]     Tubulovillous adenoma 2019    Ampulla of Vater     Current Outpatient Medications on File Prior to Visit   Medication Sig Dispense Refill    pantoprazole (PROTONIX) 40 MG tablet Take 1 tablet by mouth every morning (before breakfast) 90 tablet 3    cetirizine (ZYRTEC) 10 MG tablet Take 10 mg by mouth daily      Cholecalciferol (VITAMIN D3) 1000 units CAPS Take 2,000 Units by mouth daily       Probiotic Product (PROBIOTIC ADVANCED PO) Take by mouth      Multiple Vitamins-Minerals (BEROCCA PO) Take by mouth daily       No current facility-administered medications on file prior to visit. Allergies as of 06/23/2020 - Review Complete 06/23/2020   Allergen Reaction Noted    Zithromax [azithromycin]  07/14/2017         PHYSICAL EXAM:    Blood pressure 130/86, pulse 70, temperature 96.7 °F (35.9 °C), temperature source Temporal, height 5' 4.02\" (1.626 m), weight 205 lb 12.8 oz (93.4 kg), not currently breastfeeding. Gen:  A and O x 3, NAD, well nourished  Eyes:  Sclera non icterus, PERRL  Head:  Normocephalic, non-tender  Neck:  Supple, no adenopathy, thyroid non tender and no masses,no carotid bruits  Lungs:  CTA, symmetrical  Chest:  RRR, no murmurs  Abd:  Soft, NT, ND, no HSM, no hernias, no bruits  Ext:  No edema, no cyanosis  Psych: reveals appr    Breast Exam:    Patient examined in sitting position with hands on hips. Nipple retraction:  no  Skin dimpling:  no  Erythema:  no  Asymmetry:  no  Axillary Lymphadenopathy:  no  Supra Clavicular Lymphadenopathy:  no    Patient placed in a supine position. Right breast mass:  no  Left breast mass:  no  Tenderness:  no    ASSESS MENT:    1. Post stereotactic bx , fibroadenoma right breast      PLAN:    1.   To the OR for excision of fibroadenoma with wire localization ILL APPEARING

## 2020-08-05 ENCOUNTER — LABORATORY RESULT (OUTPATIENT)
Age: 30
End: 2020-08-05

## 2020-08-07 ENCOUNTER — RX RENEWAL (OUTPATIENT)
Age: 30
End: 2020-08-07

## 2020-08-26 NOTE — ASU PREOP CHECKLIST - PATIENT PROBLEMS/NEEDS
Patient expressed no known problems or needs Surgeon/Pathologist Verbiage (Will Incorporate Name Of Surgeon From Intro If Not Blank): operated in two distinct and integrated capacities as the surgeon and pathologist.

## 2020-09-16 ENCOUNTER — APPOINTMENT (OUTPATIENT)
Dept: ENDOCRINOLOGY | Facility: CLINIC | Age: 30
End: 2020-09-16

## 2020-10-20 ENCOUNTER — NON-APPOINTMENT (OUTPATIENT)
Age: 30
End: 2020-10-20

## 2020-10-20 ENCOUNTER — APPOINTMENT (OUTPATIENT)
Dept: INTERNAL MEDICINE | Facility: CLINIC | Age: 30
End: 2020-10-20
Payer: MEDICAID

## 2020-10-20 VITALS
RESPIRATION RATE: 14 BRPM | HEIGHT: 68 IN | BODY MASS INDEX: 42.44 KG/M2 | TEMPERATURE: 98 F | SYSTOLIC BLOOD PRESSURE: 120 MMHG | DIASTOLIC BLOOD PRESSURE: 80 MMHG | OXYGEN SATURATION: 98 % | WEIGHT: 280 LBS | HEART RATE: 76 BPM

## 2020-10-20 DIAGNOSIS — F17.210 NICOTINE DEPENDENCE, CIGARETTES, UNCOMPLICATED: ICD-10-CM

## 2020-10-20 PROCEDURE — 93000 ELECTROCARDIOGRAM COMPLETE: CPT

## 2020-10-20 PROCEDURE — 99213 OFFICE O/P EST LOW 20 MIN: CPT | Mod: 25

## 2020-10-22 ENCOUNTER — NON-APPOINTMENT (OUTPATIENT)
Age: 30
End: 2020-10-22

## 2020-10-22 NOTE — PHYSICAL EXAM
[No Acute Distress] : no acute distress [No Respiratory Distress] : no respiratory distress  [Clear to Auscultation] : lungs were clear to auscultation bilaterally [Normal Rate] : normal rate  [No Edema] : there was no peripheral edema [Regular Rhythm] : with a regular rhythm [Normal Affect] : the affect was normal [Normal Mood] : the mood was normal [Normal Outer Ear/Nose] : the outer ears and nose were normal in appearance [Normal Oropharynx] : the oropharynx was normal [Normal TMs] : both tympanic membranes were normal [Normal Nasal Mucosa] : the nasal mucosa was normal [Supple] : supple [de-identified] : negative calf pain/homans

## 2020-10-22 NOTE — ASSESSMENT
[FreeTextEntry1] : Clinical exam is normal and EKG shows no acute changes. Chest x-ray  ordered. Consider COVID testing. Patient will call with progress and return to the office when applicable for regular followup\par \par \par Dr. Aldana was present in office building while I examined patient\par

## 2020-10-22 NOTE — HISTORY OF PRESENT ILLNESS
[FreeTextEntry8] : Patient presents complaining of chest pain that started last night. Patient states she feels it in the front of her chest. Patient notices it more if she breathes in. Patient states prior to this that she had allergy type symptoms including congestion/postnasal drip for 2 weeks. Patient has noticed occasional wheezing. Patient has not had fever and reports her fiancé had bronchitis and negative COVID testing. Patient has not had any covid exposure that she knows of. Patient is a smoker. Patient has no edema/calf pain/GERD. Patient has not tried OTC medication

## 2020-11-22 ENCOUNTER — RX RENEWAL (OUTPATIENT)
Age: 30
End: 2020-11-22

## 2021-01-07 ENCOUNTER — EMERGENCY (EMERGENCY)
Facility: HOSPITAL | Age: 31
LOS: 1 days | Discharge: HOME | End: 2021-01-07
Payer: COMMERCIAL

## 2021-01-07 VITALS — OXYGEN SATURATION: 99 % | HEART RATE: 109 BPM | RESPIRATION RATE: 18 BRPM | HEIGHT: 68 IN | TEMPERATURE: 100 F

## 2021-01-07 PROCEDURE — 99283 EMERGENCY DEPT VISIT LOW MDM: CPT

## 2021-01-07 PROCEDURE — U0003: CPT

## 2021-01-07 PROCEDURE — U0005: CPT

## 2021-01-07 NOTE — ED PROVIDER NOTE - PATIENT PORTAL LINK FT
You can access the FollowMyHealth Patient Portal offered by [facility] by registering at the following website: [email or web address]. By joining [facility]’s FollowMyHealth portal, you will also be able to view your health information using other applications (apps) compatible with our system.

## 2021-01-07 NOTE — ED PROVIDER NOTE - OBJECTIVE STATEMENT
This is a 30 year old female here for covid screen, denies any symptoms.  Mother tested positive for covid.

## 2021-01-07 NOTE — ED PROVIDER NOTE - PHYSICAL EXAMINATION
Constitutional - well-developed; well nourished. Head - NCAT. Airway patent.  Neuro - A&Ox3. . normal gait.

## 2021-01-07 NOTE — ED ADULT TRIAGE NOTE - CHIEF COMPLAINT QUOTE
presents NAD, pt requesting COVID swab, +exposure, c/o fever/ HA. presents NAD, pt requesting COVID swab, +exposure, c/o loss of smell and taste.

## 2021-01-08 LAB — SARS-COV-2 RNA SPEC QL NAA+PROBE: SIGNIFICANT CHANGE UP

## 2021-01-11 ENCOUNTER — NON-APPOINTMENT (OUTPATIENT)
Age: 31
End: 2021-01-11

## 2021-01-18 ENCOUNTER — APPOINTMENT (OUTPATIENT)
Dept: ENDOCRINOLOGY | Facility: CLINIC | Age: 31
End: 2021-01-18
Payer: MEDICAID

## 2021-01-18 VITALS
HEART RATE: 93 BPM | BODY MASS INDEX: 44.41 KG/M2 | SYSTOLIC BLOOD PRESSURE: 116 MMHG | HEIGHT: 68 IN | DIASTOLIC BLOOD PRESSURE: 74 MMHG | WEIGHT: 293 LBS

## 2021-01-18 PROCEDURE — 99072 ADDL SUPL MATRL&STAF TM PHE: CPT

## 2021-01-18 PROCEDURE — 99213 OFFICE O/P EST LOW 20 MIN: CPT

## 2021-01-19 ENCOUNTER — LABORATORY RESULT (OUTPATIENT)
Age: 31
End: 2021-01-19

## 2021-01-25 NOTE — ASSESSMENT
[FreeTextEntry1] : Graves disaese post op total thryoidecotmy \par on 0..175 mg syntrhoid- toleratign it well\par  check BW inow \par \par \par Hypocalcmeia postop -  has been off otc caclium -resume citracal  2 caplets bid \par  \par  cont rocaltrol  bid \par cont VitD 50 K q2week \par \par \par Acne-improving \par off OCP \par \par check updated lbs \par \par RTC 3-4 month  \par

## 2021-01-25 NOTE — HISTORY OF PRESENT ILLNESS
[Home] : at home, [unfilled] , at the time of the visit. [Medical Office: ([facility])___] : at the medical office located in  [FreeTextEntry1] : \par Pt here follow up post op total thryoidectomy  Graves disease\par  menses regular \par on  calcitriol  0.5 mcg bid \par not taking regualr OTC calcium bene having cramps inlegs and feet\par Vit D 50 K every other week \par on 0.175 mg  Syntrhoid \par \par still smokimg  - 1 ppd \par  occ has some dyspnea  acne is ok-  stbale \par  menses are ok \par \par \par

## 2021-01-31 ENCOUNTER — EMERGENCY (EMERGENCY)
Facility: HOSPITAL | Age: 31
LOS: 1 days | Discharge: HOME | End: 2021-01-31
Attending: EMERGENCY MEDICINE
Payer: COMMERCIAL

## 2021-01-31 VITALS
HEIGHT: 68 IN | SYSTOLIC BLOOD PRESSURE: 133 MMHG | TEMPERATURE: 99 F | RESPIRATION RATE: 20 BRPM | HEART RATE: 94 BPM | OXYGEN SATURATION: 99 % | WEIGHT: 293 LBS | DIASTOLIC BLOOD PRESSURE: 81 MMHG

## 2021-01-31 LAB — S PYO DNA THROAT QL NAA+PROBE: SIGNIFICANT CHANGE UP

## 2021-01-31 PROCEDURE — U0005: CPT

## 2021-01-31 PROCEDURE — 87651 STREP A DNA AMP PROBE: CPT

## 2021-01-31 PROCEDURE — 87798 DETECT AGENT NOS DNA AMP: CPT

## 2021-01-31 PROCEDURE — 99283 EMERGENCY DEPT VISIT LOW MDM: CPT

## 2021-01-31 PROCEDURE — 99284 EMERGENCY DEPT VISIT MOD MDM: CPT

## 2021-01-31 PROCEDURE — U0003: CPT

## 2021-01-31 NOTE — ED ADULT NURSE REASSESSMENT NOTE - NS ED NURSE REASSESS COMMENT FT1
Pt received with reports of sore throat, nasal congestion, SOB and fever x1 day. Pt Awake, A&Ox4,  respirations appearing even, unlabored. Pt denies Chest pain. Pt ambulatory. Pt with no apparent acute distress observed at this time. Safety maintained.

## 2021-01-31 NOTE — ED STATDOCS - CLINICAL SUMMARY MEDICAL DECISION MAKING FREE TEXT BOX
Pt presenting with URI symptoms. Will check COVID and strep test. Otherwise well appearing, no hypoxia or respiratory distress.

## 2021-01-31 NOTE — ED STATDOCS - PROGRESS NOTE DETAILS
LEE López: Patient evaluated by intake physician. HPI/ROS/PE as noted above. Will follow up plan per intake physician and continue to assess patient.

## 2021-01-31 NOTE — ED STATDOCS - PATIENT PORTAL LINK FT
You can access the FollowMyHealth Patient Portal offered by Health system by registering at the following website: [email or web address]. By joining [employer]’s FollowMyHealth portal, you will also be able to view your health information using other applications (apps) compatible with our system.

## 2021-01-31 NOTE — ED STATDOCS - OBJECTIVE STATEMENT
31 y/o F with PMHx of anxiety, depression, hypothyroidism, and meningitis, viral presents to ED c/o fever, cough, chills, and sore throat since yesterday. Pt states (+) COVID contacts several days ago. Associated symptoms of mild sob. Denies N/V/D or abdominal pain.

## 2021-01-31 NOTE — ED STATDOCS - NSFOLLOWUPINSTRUCTIONS_ED_ALL_ED_FT
- Ibuprofen/acetaminophen for pain.  - Over the counter lozenges.  - Warm tea with honey and ginger.  - Please call to schedule follow up appointment with your primary care physician within 24-48 hours.  - Please seek immediate medical attention for any new/worsening, signs/symptoms, or concerns.    Feel better!       Sore Throat      A sore throat is pain, burning, irritation, or scratchiness in the throat. When you have a sore throat, you may feel pain or tenderness in your throat when you swallow or talk.  Many things can cause a sore throat, including:  •An infection.      •Seasonal allergies.      •Dryness in the air.      •Irritants, such as smoke or pollution.      •Radiation treatment to the area.      •Gastroesophageal reflux disease (GERD).      •A tumor.      A sore throat is often the first sign of another sickness. It may happen with other symptoms, such as coughing, sneezing, fever, and swollen neck glands. Most sore throats go away without medical treatment.      Follow these instructions at home:                •Take over-the-counter medicines only as told by your health care provider.   •If your child has a sore throat, do not give your child aspirin because of the association with Reye syndrome.        •Drink enough fluids to keep your urine pale yellow.      •Rest as needed.    •To help with pain, try:  •Sipping warm liquids, such as broth, herbal tea, or warm water.      •Eating or drinking cold or frozen liquids, such as frozen ice pops.      •Gargling with a salt-water mixture 3–4 times a day or as needed. To make a salt-water mixture, completely dissolve ½–1 tsp (3–6 g) of salt in 1 cup (237 mL) of warm water.      •Sucking on hard candy or throat lozenges.      •Putting a cool-mist humidifier in your bedroom at night to moisten the air.      •Sitting in the bathroom with the door closed for 5–10 minutes while you run hot water in the shower.        • Do not use any products that contain nicotine or tobacco, such as cigarettes, e-cigarettes, and chewing tobacco. If you need help quitting, ask your health care provider.      •Wash your hands well and often with soap and water. If soap and water are not available, use hand .        Contact a health care provider if:    •You have a fever for more than 2–3 days.      •You have symptoms that last (are persistent) for more than 2–3 days.      •Your throat does not get better within 7 days.      •You have a fever and your symptoms suddenly get worse.      •Your child who is 3 months to 3 years old has a temperature of 102.2°F (39°C) or higher.        Get help right away if:    •You have difficulty breathing.      •You cannot swallow fluids, soft foods, or your saliva.      •You have increased swelling in your throat or neck.      •You have persistent nausea and vomiting.        Summary    •A sore throat is pain, burning, irritation, or scratchiness in the throat. Many things can cause a sore throat.      •Take over-the-counter medicines only as told by your health care provider. Do not give your child aspirin.      •Drink plenty of fluids, and rest as needed.      •Contact a health care provider if your symptoms worsen or your sore throat does not get better within 7 days.      This information is not intended to replace advice given to you by your health care provider. Make sure you discuss any questions you have with your health care provider.

## 2021-02-01 ENCOUNTER — NON-APPOINTMENT (OUTPATIENT)
Age: 31
End: 2021-02-01

## 2021-02-01 LAB — SARS-COV-2 RNA SPEC QL NAA+PROBE: SIGNIFICANT CHANGE UP

## 2021-02-03 ENCOUNTER — APPOINTMENT (OUTPATIENT)
Dept: INTERNAL MEDICINE | Facility: CLINIC | Age: 31
End: 2021-02-03
Payer: MEDICAID

## 2021-02-03 VITALS
HEART RATE: 88 BPM | DIASTOLIC BLOOD PRESSURE: 80 MMHG | TEMPERATURE: 97.8 F | SYSTOLIC BLOOD PRESSURE: 120 MMHG | HEIGHT: 68 IN | OXYGEN SATURATION: 96 % | BODY MASS INDEX: 44.41 KG/M2 | RESPIRATION RATE: 13 BRPM | WEIGHT: 293 LBS

## 2021-02-03 PROCEDURE — 99213 OFFICE O/P EST LOW 20 MIN: CPT

## 2021-02-03 PROCEDURE — 99072 ADDL SUPL MATRL&STAF TM PHE: CPT

## 2021-02-04 NOTE — ASSESSMENT
[FreeTextEntry1] : CXR ordered.Patient prescribed Medrol Dosepak/Z-Derrick #1 as directed  .Patient advised to rest/increase fluids and use supportive therapy. Patient will call if symptoms persist or worsen and return to the office as scheduled for regular followup.\par \par \par Dr. Aldana was present in office building while I examined patient\par

## 2021-02-04 NOTE — HISTORY OF PRESENT ILLNESS
[FreeTextEntry8] : Patient presents complaining of not feeling well. Patient presented to [facility] ED on 1/31/21 with one day of fever, chills, cough and sore throat, COVID PCR and strep test done which was negative, symptoms were deemed viral in nature and supportive therapy advised. Patient is here stating she continues to feel unwell with Cough and temperature around 100-101. Patient is eating and drinking normally. Patient denies nausea/vomiting/diarrhea/urinary symptoms. Pt is currently using Mucinex/zyrtec/Motrin. Patient states she is slightly better but still on not well

## 2021-02-04 NOTE — PHYSICAL EXAM
[No Acute Distress] : no acute distress [No Respiratory Distress] : no respiratory distress  [Normal Rate] : normal rate  [Regular Rhythm] : with a regular rhythm [Normal Affect] : the affect was normal [Normal Mood] : the mood was normal [Normal Outer Ear/Nose] : the outer ears and nose were normal in appearance [Normal Oropharynx] : the oropharynx was normal [Normal TMs] : both tympanic membranes were normal [Normal Nasal Mucosa] : the nasal mucosa was normal [Supple] : supple [de-identified] : +Scattered wheezing with mild rhonchi

## 2021-02-24 ENCOUNTER — EMERGENCY (EMERGENCY)
Facility: HOSPITAL | Age: 31
LOS: 1 days | Discharge: HOME | End: 2021-02-24
Attending: EMERGENCY MEDICINE
Payer: COMMERCIAL

## 2021-02-24 VITALS
HEIGHT: 68 IN | WEIGHT: 293 LBS | HEART RATE: 85 BPM | OXYGEN SATURATION: 94 % | DIASTOLIC BLOOD PRESSURE: 68 MMHG | SYSTOLIC BLOOD PRESSURE: 124 MMHG | TEMPERATURE: 98 F

## 2021-02-24 LAB — HCG UR QL: NEGATIVE — SIGNIFICANT CHANGE UP

## 2021-02-24 PROCEDURE — G1004: CPT

## 2021-02-24 PROCEDURE — 99284 EMERGENCY DEPT VISIT MOD MDM: CPT

## 2021-02-24 PROCEDURE — 81025 URINE PREGNANCY TEST: CPT

## 2021-02-24 PROCEDURE — 73564 X-RAY EXAM KNEE 4 OR MORE: CPT

## 2021-02-24 PROCEDURE — 73700 CT LOWER EXTREMITY W/O DYE: CPT

## 2021-02-24 PROCEDURE — 73564 X-RAY EXAM KNEE 4 OR MORE: CPT | Mod: 26,RT

## 2021-02-24 PROCEDURE — 73700 CT LOWER EXTREMITY W/O DYE: CPT | Mod: 26,RT,ME

## 2021-02-24 RX ORDER — OXYCODONE AND ACETAMINOPHEN 5; 325 MG/1; MG/1
1 TABLET ORAL ONCE
Refills: 0 | Status: DISCONTINUED | OUTPATIENT
Start: 2021-02-24 | End: 2021-02-24

## 2021-02-24 RX ORDER — IBUPROFEN 200 MG
1 TABLET ORAL
Qty: 15 | Refills: 0
Start: 2021-02-24

## 2021-02-24 RX ADMIN — OXYCODONE AND ACETAMINOPHEN 1 TABLET(S): 5; 325 TABLET ORAL at 20:24

## 2021-02-24 NOTE — ED PROVIDER NOTE - ATTENDING CONTRIBUTION TO CARE
31 yo female pmh of hypothyroids S.p about 1 hr PTA tripped over the ICE and twist the R- knee laterally and landed on it .  for xray--and ct to ro fracture  plan knee immobilzer and ortho fu

## 2021-02-24 NOTE — ED PROVIDER NOTE - CARE PROVIDER_API CALL
Abraham Malik)  Orthopaedic Surgery  [address] [place] [address]  Phone: [phone]  Fax: [phone]  Follow Up Time:

## 2021-02-24 NOTE — ED PROVIDER NOTE - SKIN NEGATIVE STATEMENT, MLM
Received visit summary from [address] [place], [place] [address]S[place]  on 09/28.2020. Placed in [facility] bin for review.
no abrasions, no jaundice, no lesions, no pruritis, and no rashes.

## 2021-02-24 NOTE — ED PROVIDER NOTE - CLINICAL SUMMARY MEDICAL DECISION MAKING FREE TEXT BOX
31 yo female pmh of hypothyroids S.p about 1 hr PTA tripped over the ICE and twist the R- knee laterally and landed on it  R.o fracture VS sprain   percocet 5 mg , ICE , xray of the right knee

## 2021-02-24 NOTE — ED ADULT TRIAGE NOTE - CHIEF COMPLAINT QUOTE
Pt. complaining of pain to right knee.  Pt states "it popped out and popped back in."  Pt. took 800mg motrin 1 hour pta

## 2021-02-24 NOTE — ED PROVIDER NOTE - PATIENT PORTAL LINK FT
You can access the FollowMyHealth Patient Portal offered by [facility] by registering at the following website: [email or web address]. By joining [employer]’s FollowMyHealth portal, you will also be able to view your health information using other applications (apps) compatible with our system.

## 2021-02-24 NOTE — ED PROVIDER NOTE - NSFOLLOWUPINSTRUCTIONS_ED_ALL_ED_FT
Knee Sprain, Adult       A knee sprain is a stretch or tear in a knee ligament. Knee ligaments are tissues that connect bones in the knee to each other.      What are the causes?  This condition often results from:  •A fall.      •An injury to the knee.        What are the signs or symptoms?  Symptoms of this condition include:  •Trouble straightening or bending the leg.      •Swelling in the knee.      •Bruising around the knee.      •Tenderness or pain in the knee.      •Muscle spasms around the knee.        How is this diagnosed?  This condition may be diagnosed based on:  •A physical exam.      •A history of what happened just before you started to have symptoms.    •Tests, including:  •An X-ray. This may be done to make sure no bones are broken.      •An MRI. This may be done to check if the ligament is torn.      •Stress testing of the knee. This may be done to check ligament damage.          How is this treated?  Treatment for this condition may involve:  •Keeping the knee still (immobilized) with a cast, brace, or splint.      •Applying ice to the knee. This helps with pain and swelling.      •Raising (elevating) the knee above the level of your heart when you are resting. This helps with pain and swelling.      •Taking medicine for pain.      •Doing exercises to prevent or limit permanent weakness or stiffness in your knee.      •Having surgery to reconnect the ligament to the bone or to reconstruct it. This may be needed if the ligament is completely torn.        Follow these instructions at home:    If you have a splint or brace:     •Wear it as told by your health care provider. Remove it only as told by your health care provider.      •Check the skin around it every day. Tell your health care provider about any concerns.      •Loosen it if your toes tingle, become numb, or turn cold and blue.      •Keep it clean and dry.      If you have a cast:     • Do not stick anything inside it to scratch your skin. Doing that increases your risk of infection.      •Check the skin around it every day. Tell your health care provider about any concerns.      •You may put lotion on dry skin around the edges of the cast. Do not put lotion on the skin underneath the cast.      •Keep it clean and dry.      Bathing   If you have a splint, brace, or cast that is not waterproof:  •Do not let it get wet.      •Cover it with a watertight covering when you take a bath or a shower.        Managing pain, stiffness, and swelling  •If directed, put ice on the injured area. To do this:  •If you have a removable splint or brace, remove it as told by your health care provider.      •Put ice in a plastic bag.      •Place a towel between your skin and the bag or between your cast and the bag.      •Leave the ice on for 20 minutes, 2–3 times a day.        •Move your toes often to reduce stiffness and swelling.      •Elevate the injured area above the level of your heart while you are sitting or lying down.      General instructions    •Take over-the-counter and prescription medicines only as told by your health care provider.      •Do not use any products that contain nicotine or tobacco, such as cigarettes, e-cigarettes, and chewing tobacco. These can delay healing. If you need help quitting, ask your health care provider.      •Do exercises as told by your health care provider.      •Keep all follow-up visits as told by your health care provider. This is important.        Contact a health care provider if:    •You have pain that gets worse.      •The cast, brace, or splint does not fit right.      •The cast, brace, or splint gets damaged.        Get help right away if:    •You cannot use your injured knee to support any of your body weight (cannot bear weight).      •You cannot move the injured joint.      •You cannot walk more than a few steps without pain or without your knee buckling.      •You have significant pain, swelling, or numbness in the leg below the cast, brace, or splint.      •Your foot or toes are numb, cold, or blue after loosening your splint or brace.        Summary    •A knee sprain is a stretch or tear in a knee ligament that usually occurs as the result of a fall or injury.      •Treatment may involve immobilizing the knee with a cast, splint, or brace and then doing exercises.      •If the ligament is completely torn, it may require surgery to repair or replace the injured ligament

## 2021-02-24 NOTE — ED PROVIDER NOTE - OBJECTIVE STATEMENT
31 yo female pmh of hypothyroids S.p about 1 hr PTA tripped over the ICE and twist the R- knee laterally and landed on it . states she had OA on the knee and she is aware , states she was not able to walk over . denies any other injury . she denies any pregnancy  she took ibuprofen 800mg PTA

## 2021-02-24 NOTE — ED PROVIDER NOTE - PHYSICAL EXAMINATION
Const: AOX3 nontoxic appearing, no apparent respiratory or physical distress. obese female   HEENT: NC/AT. Moist mucous membranes.  Eyes: HECTOR. EOMI  Neck: Soft and supple. Full ROM without pain.  right knee: no gross deformity of extremity, FROM active and passive, (-) crepitus with ROM, (-) effusion,   (+) tenderness patella, (-) tenderness proximal fibula, (-) tenderness femoral condyles,  ligamentous stability difficult to assess secondary to pain on stressing but no gross instability noted.   Skin: No rashes, abrasions or lacerations.  Neuro: Awake, alert & oriented x 3. Moves all extremities symmetrically.

## 2021-02-25 ENCOUNTER — NON-APPOINTMENT (OUTPATIENT)
Age: 31
End: 2021-02-25

## 2021-03-02 ENCOUNTER — APPOINTMENT (OUTPATIENT)
Dept: ORTHOPEDIC SURGERY | Facility: CLINIC | Age: 31
End: 2021-03-02
Payer: MEDICAID

## 2021-03-02 VITALS
WEIGHT: 293 LBS | HEART RATE: 76 BPM | DIASTOLIC BLOOD PRESSURE: 89 MMHG | SYSTOLIC BLOOD PRESSURE: 135 MMHG | TEMPERATURE: 97.1 F | BODY MASS INDEX: 44.41 KG/M2 | HEIGHT: 68 IN

## 2021-03-02 DIAGNOSIS — M25.561 PAIN IN RIGHT KNEE: ICD-10-CM

## 2021-03-02 PROCEDURE — 27520 TREAT KNEECAP FRACTURE: CPT | Mod: RT

## 2021-03-02 PROCEDURE — 99072 ADDL SUPL MATRL&STAF TM PHE: CPT

## 2021-03-02 PROCEDURE — 99204 OFFICE O/P NEW MOD 45 MIN: CPT | Mod: 57

## 2021-03-02 NOTE — DISCUSSION/SUMMARY
[de-identified] : 30-year-old female with right knee injury and likely avulsion of the medial patellofemoral ligament of the knee.  We discussed that this will likely do well with nonoperative management but if she has a repeat dislocation, she may need surgical intervention.  We will start with physical therapy to quadricep strengthening, knee stability exercises.  No restrictions at the present time.  She may continue to have knee pain for some time.  If she is still feeling unstable or if she suffers a recurrent dislocation, we will need to get an MRI of the knee.  However as long she continues to improve, she may follow-up as needed.\par \par The question of when to drive is impossible to generalize to everyone because it is largely dependent on the individual.  Importantly, doctors do not have a license with the DMV to "clear you" or "release you" to return to driving.  There are 3 primary criteria that must be met.  You need to be off of narcotic pain medicines (otherwise you are driving under the influence).  You need to be able to get in and out of the 's seat comfortably.  And you must have regained your normal reflexes / strength.  Also, return to driving depends partly on what side had surgery (ie. Right leg operates the pedals; people with Left side surgery can generally get back to driving much sooner unless you have a clutch).  The average time to return to driving is around 2 weeks after you return to normal walking for the right side and usually sooner for the left.  We recommend 'testing' yourself with another licensed  in an empty parking lot or quiet street first in order to check your reflexes moving your foot from pedal to pedal.\par \par The patient was given the opportunity to ask questions and all questions were answered to their satisfaction.\par \par Abraham Malik MD\par Orthopaedic Trauma Surgeon\par [facility]\par [facility] [place]\par Director Orthopaedic Trauma, [facility]\par \par \par \par

## 2021-03-02 NOTE — HISTORY OF PRESENT ILLNESS
[de-identified] : Patient is a pleasant 30-year-old female who presents today for evaluation of right knee pain.  She suffered a fall on ice last week.  She was seen in [facility] emergency department where x-rays and a CT scan were performed.  She felt that she had a patella dislocation that spontaneously reduced.  She has been ambulating with out assistive devices but is still having knee pain.  She states she has had troubles with her knees in the past.  Dull, aching, 4 out of 10 [Bending] : worsened by bending [Lifting] : worsened by lifting [Weight Bearing] : worsened by weight bearing [Recumbency] : relieved by recumbency [Rest] : relieved by rest

## 2021-03-02 NOTE — PHYSICAL EXAM
[de-identified] : Physical Exam:\par General: Well appearing, no acute distress, A&O\par Neurologic: A&Ox3, No focal deficits\par Head: NCAT without abrasions, lacerations, or ecchymosis to head, face, or scalp\par Respiratory: Equal chest wall expansion bilaterally, no accessory muscle use\par Lymphatic: No lymphadenopathy palpated\par Skin: Warm and dry\par Psychiatric: Normal mood and affect\par \par RLE:\par SILT s/s/sp/dp/t\par Fires EHL/FHL/GS/TA\par 2+ DP/PT pulse\par brisk capillary refill\par Positive tenderness to palpation around the knee especially medially\par Able to straight leg raise [de-identified] : No new imaging was obtained today but plain films from the emergency department as well as a CT scan were reviewed.  No acute fracture, subluxation or dislocation is noted but there is signs of an avulsion fracture likely from the medial patellofemoral ligament noted on the axial cuts of the CT scan.

## 2021-03-23 ENCOUNTER — APPOINTMENT (OUTPATIENT)
Dept: INTERNAL MEDICINE | Facility: CLINIC | Age: 31
End: 2021-03-23
Payer: MEDICAID

## 2021-03-23 ENCOUNTER — NON-APPOINTMENT (OUTPATIENT)
Age: 31
End: 2021-03-23

## 2021-03-23 VITALS
HEART RATE: 86 BPM | HEIGHT: 68 IN | SYSTOLIC BLOOD PRESSURE: 120 MMHG | BODY MASS INDEX: 44.41 KG/M2 | RESPIRATION RATE: 14 BRPM | TEMPERATURE: 97.3 F | DIASTOLIC BLOOD PRESSURE: 82 MMHG | WEIGHT: 293 LBS

## 2021-03-23 DIAGNOSIS — Z86.39 PERSONAL HISTORY OF OTHER ENDOCRINE, NUTRITIONAL AND METABOLIC DISEASE: ICD-10-CM

## 2021-03-23 DIAGNOSIS — Z92.29 PERSONAL HISTORY OF OTHER DRUG THERAPY: ICD-10-CM

## 2021-03-23 DIAGNOSIS — Z87.898 PERSONAL HISTORY OF OTHER SPECIFIED CONDITIONS: ICD-10-CM

## 2021-03-23 DIAGNOSIS — Z87.09 PERSONAL HISTORY OF OTHER DISEASES OF THE RESPIRATORY SYSTEM: ICD-10-CM

## 2021-03-23 PROCEDURE — 93000 ELECTROCARDIOGRAM COMPLETE: CPT | Mod: 59

## 2021-03-23 PROCEDURE — 99072 ADDL SUPL MATRL&STAF TM PHE: CPT

## 2021-03-23 PROCEDURE — 99395 PREV VISIT EST AGE 18-39: CPT | Mod: 25

## 2021-03-23 PROCEDURE — G0444 DEPRESSION SCREEN ANNUAL: CPT

## 2021-03-23 PROCEDURE — G0442 ANNUAL ALCOHOL SCREEN 15 MIN: CPT

## 2021-03-23 PROCEDURE — 99406 BEHAV CHNG SMOKING 3-10 MIN: CPT

## 2021-03-23 RX ORDER — LEVOTHYROXINE SODIUM 0.17 MG/1
175 TABLET ORAL
Qty: 30 | Refills: 0 | Status: DISCONTINUED | COMMUNITY
Start: 2021-01-18

## 2021-03-23 RX ORDER — IBUPROFEN 800 MG/1
800 TABLET, FILM COATED ORAL
Qty: 15 | Refills: 0 | Status: DISCONTINUED | COMMUNITY
Start: 2021-02-25

## 2021-03-23 RX ORDER — AZITHROMYCIN 250 MG/1
250 TABLET, FILM COATED ORAL
Qty: 1 | Refills: 0 | Status: DISCONTINUED | COMMUNITY
Start: 2021-02-03 | End: 2021-03-23

## 2021-03-23 RX ORDER — METHYLPREDNISOLONE 4 MG/1
4 TABLET ORAL
Qty: 1 | Refills: 0 | Status: DISCONTINUED | COMMUNITY
Start: 2021-02-03 | End: 2021-03-23

## 2021-03-23 NOTE — COUNSELING
[Risk of tobacco use and health benefits of smoking cessation discussed] : Risk of tobacco use and health benefits of smoking cessation discussed [Cessation strategies including cessation program discussed] : Cessation strategies including cessation program discussed [Use of nicotine replacement therapies and other medications discussed] : Use of nicotine replacement therapies and other medications discussed [Encouraged to pick a quit date and identify support needed to quit] : Encouraged to pick a quit date and identify support needed to quit [Willing to Quit Smoking] : Willing to quit smoking [Potential consequences of obesity discussed] : Potential consequences of obesity discussed [Benefits of weight loss discussed] : Benefits of weight loss discussed [Structured Weight Management Program suggested:] : Structured weight management program suggested [Encouraged to maintain food diary] : Encouraged to maintain food diary [Encouraged to increase physical activity] : Encouraged to increase physical activity [Healthy eating counseling provided] : healthy eating [Smoking cessation counseling provided] : smoking cessation [Low Fat Diet] : Low fat diet [Decrease Portions] : Decrease food portions [Walking] : Walking [None] : None [Needs reinforcement, provided] : Patient needs reinforcement on understanding lifestyle changes and  the steps needed to achieve self management goals and reinforcement was provided [Tobacco Use Cessation Intermediate Greater Than 3 Minutes Up to 10 Minutes] : Tobacco Use Cessation Intermediate Greater Than 3 Minutes Up to 10 Minutes

## 2021-03-24 ENCOUNTER — NON-APPOINTMENT (OUTPATIENT)
Age: 31
End: 2021-03-24

## 2021-03-24 LAB
25(OH)D3 SERPL-MCNC: 16 NG/ML
ALBUMIN SERPL ELPH-MCNC: 4.5 G/DL
ALP BLD-CCNC: 69 U/L
ALT SERPL-CCNC: 13 U/L
ANION GAP SERPL CALC-SCNC: 14 MMOL/L
APPEARANCE: ABNORMAL
AST SERPL-CCNC: 16 U/L
BACTERIA: ABNORMAL
BASOPHILS # BLD AUTO: 0.06 K/UL
BASOPHILS NFR BLD AUTO: 0.5 %
BILIRUB SERPL-MCNC: 0.2 MG/DL
BILIRUBIN URINE: NEGATIVE
BLOOD URINE: NEGATIVE
BUN SERPL-MCNC: 12 MG/DL
CALCIUM SERPL-MCNC: 9.8 MG/DL
CHLORIDE SERPL-SCNC: 99 MMOL/L
CHOLEST SERPL-MCNC: 182 MG/DL
CO2 SERPL-SCNC: 23 MMOL/L
COLOR: NORMAL
CREAT SERPL-MCNC: 0.77 MG/DL
EOSINOPHIL # BLD AUTO: 0.18 K/UL
EOSINOPHIL NFR BLD AUTO: 1.5 %
ESTIMATED AVERAGE GLUCOSE: 108 MG/DL
GLUCOSE QUALITATIVE U: NEGATIVE
GLUCOSE SERPL-MCNC: 85 MG/DL
HBA1C MFR BLD HPLC: 5.4 %
HCT VFR BLD CALC: 42.2 %
HDLC SERPL-MCNC: 75 MG/DL
HGB BLD-MCNC: 13.5 G/DL
HYALINE CASTS: 3 /LPF
IMM GRANULOCYTES NFR BLD AUTO: 0.4 %
KETONES URINE: NORMAL
LDLC SERPL CALC-MCNC: 87 MG/DL
LEUKOCYTE ESTERASE URINE: ABNORMAL
LYMPHOCYTES # BLD AUTO: 3.48 K/UL
LYMPHOCYTES NFR BLD AUTO: 29.9 %
MAN DIFF?: NORMAL
MCHC RBC-ENTMCNC: 29.3 PG
MCHC RBC-ENTMCNC: 32 GM/DL
MCV RBC AUTO: 91.7 FL
MICROSCOPIC-UA: NORMAL
MONOCYTES # BLD AUTO: 0.56 K/UL
MONOCYTES NFR BLD AUTO: 4.8 %
NEUTROPHILS # BLD AUTO: 7.29 K/UL
NEUTROPHILS NFR BLD AUTO: 62.9 %
NITRITE URINE: NEGATIVE
NONHDLC SERPL-MCNC: 107 MG/DL
PH URINE: 5.5
PLATELET # BLD AUTO: 270 K/UL
POTASSIUM SERPL-SCNC: 5.8 MMOL/L
PROT SERPL-MCNC: 7.8 G/DL
PROTEIN URINE: NEGATIVE
RBC # BLD: 4.6 M/UL
RBC # FLD: 14.2 %
RED BLOOD CELLS URINE: 1 /HPF
SODIUM SERPL-SCNC: 135 MMOL/L
SPECIFIC GRAVITY URINE: 1.01
SQUAMOUS EPITHELIAL CELLS: 4 /HPF
T4 FREE SERPL-MCNC: 0.9 NG/DL
TRIGL SERPL-MCNC: 98 MG/DL
TSH SERPL-ACNC: 36.3 UIU/ML
UROBILINOGEN URINE: NORMAL
WBC # FLD AUTO: 11.62 K/UL
WHITE BLOOD CELLS URINE: 6 /HPF

## 2021-03-24 NOTE — HEALTH RISK ASSESSMENT
[Fair] : ~his/her~ current health as fair  [] : Yes [Yes] : Yes [1 or 2 (0 pts)] : 1 or 2 (0 points) [Never (0 pts)] : Never (0 points) [No] : In the past 12 months have you used drugs other than those required for medical reasons? No [No falls in past year] : Patient reported no falls in the past year [None] : None [With Significant Other] : lives with significant other [With Family] : lives with family [# of Members in Household ___] :  household currently consist of [unfilled] member(s) [Employed] : employed [College] : College [Single] : single [# Of Children ___] : has [unfilled] children [Sexually Active] : sexually active [Feels Safe at Home] : Feels safe at home [Fully functional (bathing, dressing, toileting, transferring, walking, feeding)] : Fully functional (bathing, dressing, toileting, transferring, walking, feeding) [Fully functional (using the telephone, shopping, preparing meals, housekeeping, doing laundry, using] : Fully functional and needs no help or supervision to perform IADLs (using the telephone, shopping, preparing meals, housekeeping, doing laundry, using transportation, managing medications and managing finances) [Smoke Detector] : smoke detector [Carbon Monoxide Detector] : carbon monoxide detector [Seat Belt] :  uses seat belt [Sunscreen] : uses sunscreen [Reviewed no changes] : Reviewed no changes [Discussed at today's visit] : Advance Directives Discussed at today's visit [Good] : ~his/her~  mood as  good [2 - 4 times a month (2 pts)] : 2-4 times a month (2 points) [0] : 2) Feeling down, depressed, or hopeless: Not at all (0) [Audit-CScore] : 2 [de-identified] : no exercise [de-identified] : not good [[id number]] : 0 [Change in mental status noted] : No change in mental status noted [Reports changes in hearing] : Reports no changes in hearing [Reports changes in vision] : Reports no changes in vision [Reports changes in dental health] : Reports no changes in dental health [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

## 2021-03-24 NOTE — PHYSICAL EXAM

## 2021-03-24 NOTE — ASSESSMENT
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.\par Recent blood work by endocrinology shows the patient still hypothyroid on levothyroxine.\par \par Patient with depression with similar history on medicine in the past but now seems mostly related to patient's significant weight gain with no desire to make lifestyle changes.\par Patient agrees that her depression is mainly rooted in her obesity\par Patient also continues to be an every day smoker.\par \par Significant lifestyle changes patient needs to make discussed in length.\par Recommend starting with a good dietary program such as Weight Watchers with regular exercise and losing weight over 2 months and then setting a date to quit smoking cigarettes.\par \par Status post fall about 3 weeks ago with avulsion fracture of the right knee medial patellofemoral ligament. Continue physical therapy.\par \par Tdap December 2019\par \par Followup with specialists as scheduled\par Venipuncture done today in the office\par Followup yearly and as needed

## 2021-03-24 NOTE — REVIEW OF SYSTEMS
[Fatigue] : fatigue [Recent Change In Weight] : ~T recent weight change [Palpitations] : palpitations [Negative] : Heme/Lymph [Chest Pain] : no chest pain [Lower Ext Edema] : no lower extremity edema [de-identified] : Acne
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
## 2021-03-24 NOTE — ADDENDUM
[FreeTextEntry1] : Blood work good other than increased TSH and low free T4 therefore patient to call endocrinology\par \par Decrease vitamin D\par \par Increased potassium therefore decrease foods high in potassium and increase water\par Slightly increased WBCs\par Followup in 2-3 weeks to check CBC and BMP

## 2021-04-09 ENCOUNTER — APPOINTMENT (OUTPATIENT)
Dept: INTERNAL MEDICINE | Facility: CLINIC | Age: 31
End: 2021-04-09

## 2021-04-12 ENCOUNTER — APPOINTMENT (OUTPATIENT)
Dept: ENDOCRINOLOGY | Facility: CLINIC | Age: 31
End: 2021-04-12
Payer: MEDICAID

## 2021-04-12 VITALS
DIASTOLIC BLOOD PRESSURE: 90 MMHG | OXYGEN SATURATION: 98 % | HEIGHT: 68 IN | HEART RATE: 90 BPM | SYSTOLIC BLOOD PRESSURE: 125 MMHG | BODY MASS INDEX: 44.41 KG/M2 | WEIGHT: 293 LBS

## 2021-04-12 PROCEDURE — 99214 OFFICE O/P EST MOD 30 MIN: CPT

## 2021-04-12 PROCEDURE — 99072 ADDL SUPL MATRL&STAF TM PHE: CPT

## 2021-04-26 NOTE — HISTORY OF PRESENT ILLNESS
[FreeTextEntry1] : \par Pt here follow up post op total thryoidectomy  Graves disease\par \par on  calcitriol  0.5 mcg bid \par not taking regualr OTC calcium bene having cramps inlegs and feet\par Vit D 50 K every other week \par on 0.175 mg 2 tab qd Syntrhoid a ccidentally self icnreased  ( thought 0.175 mg  were 0.125 mg )\par \par still smokimg  - 1 ppd \par  occ has some dyspnea  acne is ok-  stable \par  menses are ok \par \par \par

## 2021-04-26 NOTE — ASSESSMENT
[FreeTextEntry1] : Graves disaese post op total thryoidecotmy \par on 0..175 mg syntrhoid- toleratign it well\par  check BW inow \par \par \par Hypocalcmeia postop -  has been off otc caclium -resume citracal  2 caplets bid \par  \par  cont rocaltrol  bid \par cont VitD 50 K q2week \par \par \par Acne-improving \par off OCP \par check hormonal levels \par \par Obesity - consider MFN \par or other hterapy - dw pt regardig other tx \par \par check updated lbs \par \par RTC 3-4 month  \par

## 2021-04-30 ENCOUNTER — LABORATORY RESULT (OUTPATIENT)
Age: 31
End: 2021-04-30

## 2021-04-30 ENCOUNTER — APPOINTMENT (OUTPATIENT)
Dept: INTERNAL MEDICINE | Facility: CLINIC | Age: 31
End: 2021-04-30
Payer: MEDICAID

## 2021-04-30 VITALS
BODY MASS INDEX: 44.41 KG/M2 | SYSTOLIC BLOOD PRESSURE: 130 MMHG | DIASTOLIC BLOOD PRESSURE: 80 MMHG | RESPIRATION RATE: 14 BRPM | TEMPERATURE: 97.5 F | HEIGHT: 68 IN | HEART RATE: 86 BPM | WEIGHT: 293 LBS

## 2021-04-30 DIAGNOSIS — E87.5 HYPERKALEMIA: ICD-10-CM

## 2021-04-30 DIAGNOSIS — D72.829 ELEVATED WHITE BLOOD CELL COUNT, UNSPECIFIED: ICD-10-CM

## 2021-04-30 PROCEDURE — 99214 OFFICE O/P EST MOD 30 MIN: CPT | Mod: 25

## 2021-04-30 PROCEDURE — 99072 ADDL SUPL MATRL&STAF TM PHE: CPT

## 2021-05-01 LAB
25(OH)D3 SERPL-MCNC: 29.4 NG/ML
ANION GAP SERPL CALC-SCNC: 13 MMOL/L
BASOPHILS # BLD AUTO: 0.05 K/UL
BASOPHILS NFR BLD AUTO: 0.5 %
BUN SERPL-MCNC: 14 MG/DL
CALCIUM SERPL-MCNC: 9.1 MG/DL
CHLORIDE SERPL-SCNC: 104 MMOL/L
CO2 SERPL-SCNC: 22 MMOL/L
CREAT SERPL-MCNC: 0.74 MG/DL
EOSINOPHIL # BLD AUTO: 0.26 K/UL
EOSINOPHIL NFR BLD AUTO: 2.4 %
GLUCOSE SERPL-MCNC: 86 MG/DL
HCT VFR BLD CALC: 40.2 %
HGB BLD-MCNC: 12.8 G/DL
IMM GRANULOCYTES NFR BLD AUTO: 0.3 %
LYMPHOCYTES # BLD AUTO: 3.57 K/UL
LYMPHOCYTES NFR BLD AUTO: 33.6 %
MAN DIFF?: NORMAL
MCHC RBC-ENTMCNC: 29.6 PG
MCHC RBC-ENTMCNC: 31.8 GM/DL
MCV RBC AUTO: 93.1 FL
MONOCYTES # BLD AUTO: 0.53 K/UL
MONOCYTES NFR BLD AUTO: 5 %
NEUTROPHILS # BLD AUTO: 6.19 K/UL
NEUTROPHILS NFR BLD AUTO: 58.2 %
PLATELET # BLD AUTO: 207 K/UL
POTASSIUM SERPL-SCNC: 4.6 MMOL/L
RBC # BLD: 4.32 M/UL
RBC # FLD: 13.8 %
SODIUM SERPL-SCNC: 140 MMOL/L
WBC # FLD AUTO: 10.63 K/UL

## 2021-05-01 NOTE — ADDENDUM
[FreeTextEntry1] : Labs show\par -WBC improved at 10.6\par -WBC by endo same day, 2 hours later is normal at 9.6\par -VIt D improved=cont supplement Q week

## 2021-05-01 NOTE — PHYSICAL EXAM
[No Acute Distress] : no acute distress [No Respiratory Distress] : no respiratory distress  [Clear to Auscultation] : lungs were clear to auscultation bilaterally [Normal Rate] : normal rate  [Regular Rhythm] : with a regular rhythm [Normal Affect] : the affect was normal [Normal Mood] : the mood was normal [de-identified] : +Tinea pedis left sole/in between toes, no secondary infection

## 2021-05-01 NOTE — ASSESSMENT
[FreeTextEntry1] : Lotrisone cream given,Venipuncture done in our office, Labs sent out. Patient advised to continue present medications with diet/exercise and specialists followup. Patient  will return to the office for CP Qyear/prn\par \par Dr. Aldana was present in office building while I examined patient\par \par \par \par cigs+\par MG @ 43 y/o\par did not get flu shot this year, +covid vaccine\par MD's\par 1.Endocrine-Dr. Mendez\par 2. Ortho-Dr. Malik\par 3.GYN- "not in years" "to do"\par 4.EYE MD "to do"\par

## 2021-05-01 NOTE — HISTORY OF PRESENT ILLNESS
[FreeTextEntry1] : followup [de-identified] : Patient presents to repeat labs, see prior note as patient was found to have WBC elevated at 11.6 with potassium of 5.8 and vit D low 16, on supplementation\par -Abnormal results discussed with patient questions answered\par -continues on levothyroxine for hypothyroidism\par -got covid vaccines\par -Patient complaining of left foot rash/skin change for at least one week, slightly itchy

## 2021-05-04 ENCOUNTER — NON-APPOINTMENT (OUTPATIENT)
Age: 31
End: 2021-05-04

## 2021-05-12 ENCOUNTER — APPOINTMENT (OUTPATIENT)
Dept: ENDOCRINOLOGY | Facility: CLINIC | Age: 31
End: 2021-05-12

## 2021-05-21 NOTE — ED STATDOCS - NS ED MD DISPO DISCHARGE CCDA
Responded to mychart.   Jerald Vela RN  May 21, 2021       Patient/Caregiver provided printed discharge information.

## 2021-06-29 ENCOUNTER — APPOINTMENT (OUTPATIENT)
Dept: INTERNAL MEDICINE | Facility: CLINIC | Age: 31
End: 2021-06-29
Payer: MEDICAID

## 2021-06-29 VITALS
DIASTOLIC BLOOD PRESSURE: 82 MMHG | OXYGEN SATURATION: 98 % | HEART RATE: 90 BPM | BODY MASS INDEX: 44.41 KG/M2 | HEIGHT: 68 IN | RESPIRATION RATE: 13 BRPM | WEIGHT: 293 LBS | SYSTOLIC BLOOD PRESSURE: 130 MMHG | TEMPERATURE: 97.3 F

## 2021-06-29 PROCEDURE — 99214 OFFICE O/P EST MOD 30 MIN: CPT

## 2021-06-30 ENCOUNTER — APPOINTMENT (OUTPATIENT)
Dept: ULTRASOUND IMAGING | Facility: CLINIC | Age: 31
End: 2021-06-30

## 2021-07-02 NOTE — ADDENDUM
[FreeTextEntry1] : Sonogram of the area shows\par -Palpable lesion corresponds to a hypoechoic lesion in the subcutaneous tissues which may represent a complex cystic lesion\par Followup is recommended\par \par Patient told to complete the antibiotic and repeat sonogram in 4-6 weeks

## 2021-07-02 NOTE — ASSESSMENT
[FreeTextEntry1] : Augmentin 875 mg one b.i.d. #14 given. Sonogram of area ordered. Further recommendations to follow. Patient returns to the office as scheduled for regular followup\par \par Dr. Aldana was present in office building while I examined patient\par

## 2021-07-02 NOTE — PHYSICAL EXAM
[No Acute Distress] : no acute distress [No Respiratory Distress] : no respiratory distress  [Clear to Auscultation] : lungs were clear to auscultation bilaterally [Normal Rate] : normal rate  [Regular Rhythm] : with a regular rhythm [Normal Affect] : the affect was normal [Normal Mood] : the mood was normal [Normal Outer Ear/Nose] : the outer ears and nose were normal in appearance [Normal Oropharynx] : the oropharynx was normal [Normal TMs] : both tympanic membranes were normal [Normal Nasal Mucosa] : the nasal mucosa was normal [Supple] : supple [de-identified] : +Nickel sized soft and mobile cystic-like lesion at base of skull/hairline on the right, +tender

## 2021-07-07 ENCOUNTER — NON-APPOINTMENT (OUTPATIENT)
Age: 31
End: 2021-07-07

## 2021-07-26 ENCOUNTER — RX RENEWAL (OUTPATIENT)
Age: 31
End: 2021-07-26

## 2021-07-30 ENCOUNTER — APPOINTMENT (OUTPATIENT)
Dept: ENDOCRINOLOGY | Facility: CLINIC | Age: 31
End: 2021-07-30
Payer: MEDICAID

## 2021-07-30 VITALS
HEIGHT: 68 IN | WEIGHT: 293 LBS | OXYGEN SATURATION: 98 % | SYSTOLIC BLOOD PRESSURE: 122 MMHG | DIASTOLIC BLOOD PRESSURE: 70 MMHG | BODY MASS INDEX: 44.41 KG/M2 | HEART RATE: 97 BPM

## 2021-07-30 PROCEDURE — 99214 OFFICE O/P EST MOD 30 MIN: CPT

## 2021-08-01 NOTE — PHYSICAL EXAM
[Alert] : alert [Well Nourished] : well nourished [No Acute Distress] : no acute distress [Well Developed] : well developed [Normal Sclera/Conjunctiva] : normal sclera/conjunctiva [EOMI] : extra ocular movement intact [No Proptosis] : no proptosis [Normal Oropharynx] : the oropharynx was normal [No Neck Mass] : no neck mass was observed [No LAD] : no lymphadenopathy [No Respiratory Distress] : no respiratory distress [No Accessory Muscle Use] : no accessory muscle use [Clear to Auscultation] : lungs were clear to auscultation bilaterally [Normal S1, S2] : normal S1 and S2 [Normal Rate] : heart rate was normal [Regular Rhythm] : with a regular rhythm [No Edema] : no peripheral edema [Pedal Pulses Normal] : the pedal pulses are present [Normal Anterior Cervical Nodes] : no anterior cervical lymphadenopathy [No Spinal Tenderness] : no spinal tenderness [Spine Straight] : spine straight [No Stigmata of Cushings Syndrome] : no stigmata of Cushings Syndrome [Normal Gait] : normal gait [Normal Strength/Tone] : muscle strength and tone were normal [No Rash] : no rash [Acanthosis Nigricans] : no acanthosis nigricans [Normal Reflexes] : deep tendon reflexes were 2+ and symmetric [No Tremors] : no tremors [Oriented x3] : oriented to person, place, and time [de-identified] : Neg Nitza

## 2021-08-01 NOTE — HISTORY OF PRESENT ILLNESS
[FreeTextEntry1] : \par Pt here follow up post op total thryoidectomy  Graves disease\par \par on  calcitriol  0.5 mcg bid \par \par has been trying to lose weight \par  not trying to get pregantn \par menses are regualr \par \par \par trying to quit smoking  but needs help \par \par had COvid Vaccine x 2 \par \par \par due to see opthalmoogy \par Vit D 50 K every other week \par on 0.175 mg 2 tab qd Syntrhoid a ccidentally self icnreased  ( thought 0.175 mg  were 0.125 mg )\par \par still smokimg  - 1 ppd \par  occ has some dyspnea  acne is ok-  stable \par  menses are ok \par \par \par

## 2021-08-01 NOTE — ASSESSMENT
[FreeTextEntry1] : Graves disaese post op total thryoidecotmy \par on 0..175 mg X2  syntrhoid- toleratign it well\par  check BW inow \par \par \par Hypocalcmeia postop -  Cont rocaltrol \par \par cont VitD 50 K q2week \par \par \par Acne-improving \par off OCP \par check hormonal levels \par \par Obesity - consider ozempic- will order \par  dw pt side efect \par  not planning preganncy \par \par will come in for teach if approved \par check updated lbs \par \par RTC 3-4 month  \par

## 2021-08-27 LAB
BASOPHILS # BLD AUTO: 0.04 K/UL
BASOPHILS NFR BLD AUTO: 0.6 %
EOSINOPHIL # BLD AUTO: 0.19 K/UL
EOSINOPHIL NFR BLD AUTO: 2.8 %
ESTRADIOL SERPL-MCNC: 123 PG/ML
HCT VFR BLD CALC: 40.9 %
HGB BLD-MCNC: 13.5 G/DL
IMM GRANULOCYTES NFR BLD AUTO: 0.1 %
LYMPHOCYTES # BLD AUTO: 2.29 K/UL
LYMPHOCYTES NFR BLD AUTO: 33.7 %
MAN DIFF?: NORMAL
MCHC RBC-ENTMCNC: 28.2 PG
MCHC RBC-ENTMCNC: 33 GM/DL
MCV RBC AUTO: 85.6 FL
MONOCYTES # BLD AUTO: 0.41 K/UL
MONOCYTES NFR BLD AUTO: 6 %
NEUTROPHILS # BLD AUTO: 3.85 K/UL
NEUTROPHILS NFR BLD AUTO: 56.8 %
PLATELET # BLD AUTO: 244 K/UL
RBC # BLD: 4.78 M/UL
RBC # FLD: 13 %
WBC # FLD AUTO: 6.79 K/UL

## 2021-09-08 NOTE — ED STATDOCS - CHPI ED AGGRAVATING FACTORS
dw pt and  nothing dw pt and / acp josi team dw pt and family josi team her swelling has Improved significantly: she is not wheezing too: for biopsy today she is doing ok : swelling has Improved: on Eliquis now: her neck swelling and left arm swelling is OK: for outpt follow up if dced

## 2021-10-05 LAB
17OHP SERPL-MCNC: 99 NG/DL
25(OH)D3 SERPL-MCNC: 26.7 NG/ML
ACTH SER-ACNC: 19.8 PG/ML
ALBUMIN SERPL ELPH-MCNC: 4.2 G/DL
ALP BLD-CCNC: 60 U/L
ALT SERPL-CCNC: 16 U/L
ANION GAP SERPL CALC-SCNC: 13 MMOL/L
APPEARANCE: CLEAR
AST SERPL-CCNC: 15 U/L
BILIRUB SERPL-MCNC: 0.2 MG/DL
BILIRUBIN URINE: NEGATIVE
BLOOD URINE: NEGATIVE
BUN SERPL-MCNC: 11 MG/DL
CALCIUM SERPL-MCNC: 8.7 MG/DL
CHLORIDE SERPL-SCNC: 106 MMOL/L
CHOLEST SERPL-MCNC: 142 MG/DL
CO2 SERPL-SCNC: 19 MMOL/L
COLOR: YELLOW
CORTICOSTEROID BIND GLOBULIN: 2.3 MG/DL
CORTIS SERPL-MCNC: 10 UG/DL
CORTISOL, FREE: 0.54 UG/DL
CREAT SERPL-MCNC: 0.68 MG/DL
CREAT SPEC-SCNC: 180 MG/DL
DHEA SERPL-MCNC: 696 NG/DL
DHEA-SULFATE, SERUM: 177 UG/DL
ESTIMATED AVERAGE GLUCOSE: 114 MG/DL
FSH SERPL-MCNC: 3.8 IU/L
GH SERPL-MCNC: 0.12 NG/ML
GLUCOSE QUALITATIVE U: NEGATIVE
GLUCOSE SERPL-MCNC: 99 MG/DL
HBA1C MFR BLD HPLC: 5.6 %
HDLC SERPL-MCNC: 46 MG/DL
IGF-1 INTERP: NORMAL
IGF-I BLD-MCNC: 104 NG/ML
KETONES URINE: NEGATIVE
LDLC SERPL CALC-MCNC: 77 MG/DL
LEUKOCYTE ESTERASE URINE: NEGATIVE
LH SERPL-ACNC: 4.5 IU/L
MICROALBUMIN 24H UR DL<=1MG/L-MCNC: <1.2 MG/DL
MICROALBUMIN/CREAT 24H UR-RTO: NORMAL MG/G
NITRITE URINE: NEGATIVE
NONHDLC SERPL-MCNC: 96 MG/DL
PFCX: 5.4 %
PH URINE: 6
POTASSIUM SERPL-SCNC: 4.2 MMOL/L
PROLACTIN SERPL-MCNC: 19.7 NG/ML
PROT SERPL-MCNC: 7.1 G/DL
PROTEIN URINE: NORMAL
SHBG SERPL-SCNC: 46.4 NMOL/L
SODIUM SERPL-SCNC: 138 MMOL/L
SPECIFIC GRAVITY URINE: 1.03
T3FREE SERPL-MCNC: 4.03 PG/ML
T4 FREE SERPL-MCNC: 2 NG/DL
TESTOST BND SERPL-MCNC: 2.8 PG/ML
TESTOSTERONE TOTAL S: 36 NG/DL
TRIGL SERPL-MCNC: 94 MG/DL
TSH SERPL-ACNC: 0.08 UIU/ML
UROBILINOGEN URINE: NORMAL
VIT B12 SERPL-MCNC: 433 PG/ML

## 2021-10-06 ENCOUNTER — TRANSCRIPTION ENCOUNTER (OUTPATIENT)
Age: 31
End: 2021-10-06

## 2021-10-13 ENCOUNTER — APPOINTMENT (OUTPATIENT)
Dept: INTERNAL MEDICINE | Facility: CLINIC | Age: 31
End: 2021-10-13
Payer: MEDICAID

## 2021-10-13 VITALS
TEMPERATURE: 96.2 F | RESPIRATION RATE: 12 BRPM | HEART RATE: 78 BPM | DIASTOLIC BLOOD PRESSURE: 80 MMHG | SYSTOLIC BLOOD PRESSURE: 120 MMHG

## 2021-10-13 DIAGNOSIS — J06.9 ACUTE UPPER RESPIRATORY INFECTION, UNSPECIFIED: ICD-10-CM

## 2021-10-13 PROCEDURE — 99214 OFFICE O/P EST MOD 30 MIN: CPT

## 2021-10-13 RX ORDER — AMOXICILLIN AND CLAVULANATE POTASSIUM 875; 125 MG/1; MG/1
875-125 TABLET, COATED ORAL
Qty: 14 | Refills: 0 | Status: DISCONTINUED | COMMUNITY
Start: 2021-06-29 | End: 2021-10-13

## 2021-10-13 NOTE — ASSESSMENT
[FreeTextEntry1] : Dermatology referral given.RVP sent out.Patient prescribed A Z-Derrick #1 as directed  .Patient advised to rest/increase fluids and use supportive therapy. Patient will call if symptoms persist or worsen and return to the office as scheduled for regular followup.\par \par \par \par Dr. Aldana was present in office building while I examined patient\par

## 2021-10-13 NOTE — PHYSICAL EXAM
[No Acute Distress] : no acute distress [No Respiratory Distress] : no respiratory distress  [Normal Rate] : normal rate  [Regular Rhythm] : with a regular rhythm [Normal Affect] : the affect was normal [Normal Mood] : the mood was normal [Normal Outer Ear/Nose] : the outer ears and nose were normal in appearance [Normal Oropharynx] : the oropharynx was normal [Normal TMs] : both tympanic membranes were normal [Normal Nasal Mucosa] : the nasal mucosa was normal [de-identified] : mild wheezing, cough noted [de-identified] : +tinea pedis b/l feet

## 2021-10-13 NOTE — HISTORY OF PRESENT ILLNESS
[FreeTextEntry8] : Pt presents complaining of\par \par #1. Ongoing tinea pedis despite treatment by out office, would like referral for specialist\par \par #2. Complaining of not feeling well for one week. Patient is complaining of cough with phlegm, son w/parainfluenza. Patient has had no covid exposure, she did receive vaccine. Patient has no dyspnea/shortness of breath. Patient states she's not getting any better despite OTC medication

## 2021-10-14 ENCOUNTER — NON-APPOINTMENT (OUTPATIENT)
Age: 31
End: 2021-10-14

## 2021-10-14 ENCOUNTER — TRANSCRIPTION ENCOUNTER (OUTPATIENT)
Age: 31
End: 2021-10-14

## 2021-10-14 LAB
RAPID RVP RESULT: NOT DETECTED
SARS-COV-2 RNA PNL RESP NAA+PROBE: NOT DETECTED

## 2021-10-14 NOTE — ED PROVIDER NOTE - RESPIRATORY NEGATIVE STATEMENT, MLM
Your opinion matters!  Thank you for choosing the [facility].  You might receive a survey about your experience today to evaluate our clinic.  If you do receive one, please take a few minutes to complete it.    Have questions? Our preferred method for contact is a telephone call.  Telephone calls have a turn around time of 1 business day. Please be aware that messages left via the Patient Portal have a turn around of time of 3-5 business days.    Prescription Refills?  Please call your preferred pharmacy.    Appointment changes? If you need to cancel, change, or schedule an appointment, please call the appropriate clinic  listed below.     [place]       [phone]  [place]   [phone]  [place]            [phone]  [place]            [phone]      It was a pleasure to care for you today!      
no chest pain, no cough, and no shortness of breath.

## 2021-10-23 ENCOUNTER — APPOINTMENT (OUTPATIENT)
Dept: CT IMAGING | Facility: CLINIC | Age: 31
End: 2021-10-23

## 2021-12-17 ENCOUNTER — APPOINTMENT (OUTPATIENT)
Dept: ENDOCRINOLOGY | Facility: CLINIC | Age: 31
End: 2021-12-17
Payer: MEDICAID

## 2021-12-17 VITALS
BODY MASS INDEX: 41.68 KG/M2 | WEIGHT: 275 LBS | DIASTOLIC BLOOD PRESSURE: 72 MMHG | HEART RATE: 74 BPM | OXYGEN SATURATION: 96 % | HEIGHT: 68 IN | SYSTOLIC BLOOD PRESSURE: 110 MMHG

## 2021-12-17 DIAGNOSIS — E04.9 NONTOXIC GOITER, UNSPECIFIED: ICD-10-CM

## 2021-12-17 PROCEDURE — 99214 OFFICE O/P EST MOD 30 MIN: CPT

## 2021-12-17 RX ORDER — CLOTRIMAZOLE AND BETAMETHASONE DIPROPIONATE 10; .5 MG/G; MG/G
1-0.05 CREAM TOPICAL
Qty: 1 | Refills: 0 | Status: DISCONTINUED | COMMUNITY
Start: 2021-04-30 | End: 2021-12-17

## 2021-12-17 RX ORDER — AZITHROMYCIN 250 MG/1
250 TABLET, FILM COATED ORAL
Qty: 1 | Refills: 0 | Status: DISCONTINUED | COMMUNITY
Start: 2021-10-13 | End: 2021-12-17

## 2021-12-17 RX ORDER — SEMAGLUTIDE 1.34 MG/ML
2 INJECTION, SOLUTION SUBCUTANEOUS
Qty: 4 | Refills: 1 | Status: DISCONTINUED | COMMUNITY
Start: 2021-07-30 | End: 2021-12-17

## 2021-12-17 NOTE — ASSESSMENT
[FreeTextEntry1] : Graves disease post op total thyroidectomy \par on 0.175 mg X 2 Synthroid- tolerating it well\par \par \par Hypocalcemia post-op - Calcitrol 0.5 mcg BID\par \par cont Vit D 50 K q2week \par \par Acne\par -follow up with dermatology\par check hormonal levels \par \par Obesity -\par Increase diet and exercise efforts \par  not planning pregnancy \par \par \par RTC 3-4 month

## 2021-12-17 NOTE — HISTORY OF PRESENT ILLNESS
[FreeTextEntry1] : Pt here follow up post op total thyroidectomy Graves disease (2019)\par \par on calcitriol 0.5 mcg bid \par \par has been trying to lose weight down 25 pounds since last visit \par -Ozempic was not covered by insurance, has changed diet and increased water intake. Walking around block\par not trying to get pregnant at this time\par menses are regular \par \par \par trying to quit smoking but needs help and has cut back \par \par had COvid Vaccine x 2 \par \par \par Vit D 50 K every other week \par on 0.175 mg 2 tab qd Synthroid\par \par still smokimg - 1 ppd \par \par Acne comes and goes, has appt with dermatologist in 1/2022\par

## 2021-12-17 NOTE — REVIEW OF SYSTEMS
[Recent Weight Loss (___ Lbs)] : recent weight loss: [unfilled] lbs [Polydipsia] : polydipsia [Fatigue] : no fatigue [Visual Field Defect] : no visual field defect [Dysphagia] : no dysphagia [Neck Pain] : no neck pain [Chest Pain] : no chest pain [Palpitations] : no palpitations [Shortness Of Breath] : no shortness of breath [Nausea] : no nausea [Constipation] : no constipation [Vomiting] : no vomiting [Diarrhea] : no diarrhea [Polyuria] : no polyuria

## 2021-12-27 ENCOUNTER — TRANSCRIPTION ENCOUNTER (OUTPATIENT)
Age: 31
End: 2021-12-27

## 2021-12-30 ENCOUNTER — NON-APPOINTMENT (OUTPATIENT)
Age: 31
End: 2021-12-30

## 2022-01-19 ENCOUNTER — LABORATORY RESULT (OUTPATIENT)
Age: 32
End: 2022-01-19

## 2022-01-21 ENCOUNTER — NON-APPOINTMENT (OUTPATIENT)
Age: 32
End: 2022-01-21

## 2022-01-21 ENCOUNTER — APPOINTMENT (OUTPATIENT)
Dept: INTERNAL MEDICINE | Facility: CLINIC | Age: 32
End: 2022-01-21
Payer: MEDICAID

## 2022-01-21 VITALS
DIASTOLIC BLOOD PRESSURE: 80 MMHG | HEART RATE: 82 BPM | SYSTOLIC BLOOD PRESSURE: 120 MMHG | TEMPERATURE: 98.4 F | RESPIRATION RATE: 11 BRPM | WEIGHT: 266 LBS | BODY MASS INDEX: 40.45 KG/M2

## 2022-01-21 DIAGNOSIS — R06.02 SHORTNESS OF BREATH: ICD-10-CM

## 2022-01-21 DIAGNOSIS — R00.2 PALPITATIONS: ICD-10-CM

## 2022-01-21 PROCEDURE — 93000 ELECTROCARDIOGRAM COMPLETE: CPT | Mod: 59

## 2022-01-21 PROCEDURE — 99214 OFFICE O/P EST MOD 30 MIN: CPT | Mod: 25

## 2022-01-22 ENCOUNTER — APPOINTMENT (OUTPATIENT)
Dept: RADIOLOGY | Facility: CLINIC | Age: 32
End: 2022-01-22
Payer: MEDICAID

## 2022-01-22 ENCOUNTER — OUTPATIENT (OUTPATIENT)
Dept: OUTPATIENT SERVICES | Facility: HOSPITAL | Age: 32
LOS: 1 days | End: 2022-01-22
Payer: MEDICAID

## 2022-01-22 DIAGNOSIS — Z00.8 ENCOUNTER FOR OTHER GENERAL EXAMINATION: ICD-10-CM

## 2022-01-22 PROCEDURE — 71046 X-RAY EXAM CHEST 2 VIEWS: CPT

## 2022-01-22 PROCEDURE — 71046 X-RAY EXAM CHEST 2 VIEWS: CPT | Mod: 26

## 2022-01-23 LAB
ALBUMIN SERPL ELPH-MCNC: 4.4 G/DL
ALP BLD-CCNC: 68 U/L
ALT SERPL-CCNC: 20 U/L
ANION GAP SERPL CALC-SCNC: 15 MMOL/L
AST SERPL-CCNC: 20 U/L
BASOPHILS # BLD AUTO: 0.03 K/UL
BASOPHILS NFR BLD AUTO: 0.3 %
BILIRUB SERPL-MCNC: <0.2 MG/DL
BUN SERPL-MCNC: 11 MG/DL
CALCIUM SERPL-MCNC: 9 MG/DL
CHLORIDE SERPL-SCNC: 103 MMOL/L
CO2 SERPL-SCNC: 20 MMOL/L
CREAT SERPL-MCNC: 0.61 MG/DL
DEPRECATED D DIMER PPP IA-ACNC: <150 NG/ML DDU
EOSINOPHIL # BLD AUTO: 0.18 K/UL
EOSINOPHIL NFR BLD AUTO: 1.9 %
GLUCOSE SERPL-MCNC: 94 MG/DL
HCT VFR BLD CALC: 42.1 %
HGB BLD-MCNC: 13.7 G/DL
IMM GRANULOCYTES NFR BLD AUTO: 0.7 %
LYMPHOCYTES # BLD AUTO: 2.88 K/UL
LYMPHOCYTES NFR BLD AUTO: 30.7 %
MAN DIFF?: NORMAL
MCHC RBC-ENTMCNC: 27.8 PG
MCHC RBC-ENTMCNC: 32.5 GM/DL
MCV RBC AUTO: 85.6 FL
MONOCYTES # BLD AUTO: 0.47 K/UL
MONOCYTES NFR BLD AUTO: 5 %
NEUTROPHILS # BLD AUTO: 5.76 K/UL
NEUTROPHILS NFR BLD AUTO: 61.4 %
PLATELET # BLD AUTO: 261 K/UL
POTASSIUM SERPL-SCNC: 4.4 MMOL/L
PROT SERPL-MCNC: 7.3 G/DL
RBC # BLD: 4.92 M/UL
RBC # FLD: 13.2 %
SODIUM SERPL-SCNC: 139 MMOL/L
T4 FREE SERPL-MCNC: 2.8 NG/DL
TSH SERPL-ACNC: <0.01 UIU/ML
WBC # FLD AUTO: 9.39 K/UL

## 2022-01-23 NOTE — PHYSICAL EXAM
[No Acute Distress] : no acute distress [No Respiratory Distress] : no respiratory distress  [Clear to Auscultation] : lungs were clear to auscultation bilaterally [Normal Rate] : normal rate  [Regular Rhythm] : with a regular rhythm [Normal Affect] : the affect was normal [Normal Mood] : the mood was normal [No Edema] : there was no peripheral edema [de-identified] : Neg homans

## 2022-01-23 NOTE — HISTORY OF PRESENT ILLNESS
[FreeTextEntry8] : Patient presents complaining of a racing heart with occasional palpitations for at least the past week. Patient states her heart rate has spiked as high as 187 (she checked it with apple watch). Patient feels mild shortness of breath with exertion at times. Patient is a smoker. Patient denies chest pain/calf pain/edema. Patient did have covid around [place]ill[place] Nadia, no breathing issues during. Patient does have hypothyroidism and she follows with endocrinology, her thyroid was overactive 2 days ago and they did decrease her medication.

## 2022-01-23 NOTE — ASSESSMENT
[FreeTextEntry1] : Venipuncture done in our office, Labs sent out.Chest x-ray ordered. Cardiology referral given and appointment made for patient, EKG currently shows no acute changes. Symptoms possibly related to overactive thyroid, medication lower 2 days ago. Further recommendations pending blood work, chest x-ray and cardiology evaluation. Patient will go to the ED if needed for worsening symptoms. Patient will return to the office as scheduled for regular followup\par \par Dr. Aldana was present in office building while I examined patient\par

## 2022-01-23 NOTE — ADDENDUM
[FreeTextEntry1] : Labs show\par TSH < with increased Ft4=to d/w endo, likely reason for current symptoms, faxed

## 2022-01-24 ENCOUNTER — NON-APPOINTMENT (OUTPATIENT)
Age: 32
End: 2022-01-24

## 2022-01-25 ENCOUNTER — NON-APPOINTMENT (OUTPATIENT)
Age: 32
End: 2022-01-25

## 2022-02-09 ENCOUNTER — NON-APPOINTMENT (OUTPATIENT)
Age: 32
End: 2022-02-09

## 2022-02-15 ENCOUNTER — NON-APPOINTMENT (OUTPATIENT)
Age: 32
End: 2022-02-15

## 2022-02-23 ENCOUNTER — APPOINTMENT (OUTPATIENT)
Dept: DERMATOLOGY | Facility: CLINIC | Age: 32
End: 2022-02-23
Payer: MEDICAID

## 2022-02-23 PROCEDURE — 99203 OFFICE O/P NEW LOW 30 MIN: CPT

## 2022-03-04 NOTE — ED PROVIDER NOTE - ATTENDING WITH...
ACP Nasalis-Muscle-Based Myocutaneous Island Pedicle Flap Text: Using a #15 blade, an incision was made around the donor flap to the level of the nasalis muscle. Wide lateral undermining was then performed in both the subcutaneous plane above the nasalis muscle, and in a submuscular plane just above periosteum. This allowed the formation of a free nasalis muscle axial pedicle (based on the angular artery) which was still attached to the actual cutaneous flap, increasing its mobility and vascular viability. Hemostasis was obtained with pinpoint electrocoagulation. The flap was mobilized into position and the pivotal anchor points positioned and stabilized with buried interrupted sutures. Subcutaneous and dermal tissues were closed in a multilayered fashion with sutures. Tissue redundancies were excised, and the epidermal edges were apposed without significant tension and sutured with sutures.

## 2022-03-17 NOTE — ED STATDOCS - CROS ED MUSC ALL NEG
- - - Xerosis Normal Treatment: I recommended application of Cetaphil or CeraVe numerous times a day going to bed to all dry areas.

## 2022-03-24 ENCOUNTER — RX RENEWAL (OUTPATIENT)
Age: 32
End: 2022-03-24

## 2022-04-14 ENCOUNTER — APPOINTMENT (OUTPATIENT)
Dept: ENDOCRINOLOGY | Facility: CLINIC | Age: 32
End: 2022-04-14
Payer: MEDICAID

## 2022-04-14 VITALS
HEIGHT: 68 IN | HEART RATE: 70 BPM | BODY MASS INDEX: 38.34 KG/M2 | SYSTOLIC BLOOD PRESSURE: 116 MMHG | DIASTOLIC BLOOD PRESSURE: 68 MMHG | WEIGHT: 253 LBS

## 2022-04-14 PROCEDURE — 99214 OFFICE O/P EST MOD 30 MIN: CPT

## 2022-04-24 NOTE — COUNSELING
[Benefits of weight loss discussed] : Benefits of weight loss discussed [Potential consequences of obesity discussed] : Potential consequences of obesity discussed [Structured Weight Management Program suggested:] : Structured weight management program suggested [Encouraged to maintain food diary] : Encouraged to maintain food diary [Encouraged to increase physical activity] : Encouraged to increase physical activity [Healthy eating counseling provided] : healthy eating [Smoking cessation counseling provided] : smoking cessation [Low Fat Diet] : Low fat diet [Walking] : Walking [Decrease Portions] : Decrease food portions [None] : None [Needs reinforcement, provided] : Patient needs reinforcement on understanding lifestyle changes and  the steps needed to achieve self management goals and reinforcement was provided

## 2022-04-25 ENCOUNTER — APPOINTMENT (OUTPATIENT)
Dept: INTERNAL MEDICINE | Facility: CLINIC | Age: 32
End: 2022-04-25
Payer: MEDICAID

## 2022-04-25 VITALS
OXYGEN SATURATION: 99 % | RESPIRATION RATE: 18 BRPM | HEART RATE: 84 BPM | DIASTOLIC BLOOD PRESSURE: 68 MMHG | BODY MASS INDEX: 39.71 KG/M2 | SYSTOLIC BLOOD PRESSURE: 124 MMHG | WEIGHT: 262 LBS | HEIGHT: 68 IN

## 2022-04-25 VITALS
HEIGHT: 68 IN | HEART RATE: 84 BPM | BODY MASS INDEX: 39.71 KG/M2 | DIASTOLIC BLOOD PRESSURE: 68 MMHG | SYSTOLIC BLOOD PRESSURE: 124 MMHG | RESPIRATION RATE: 18 BRPM | OXYGEN SATURATION: 99 % | WEIGHT: 262 LBS

## 2022-04-25 DIAGNOSIS — Z13.31 ENCOUNTER FOR SCREENING FOR DEPRESSION: ICD-10-CM

## 2022-04-25 DIAGNOSIS — Z13.39 ENCOUNTER FOR SCREENING EXAM FOR OTHER MENTAL HEALTH AND BEHAVIORAL DISORDERS: ICD-10-CM

## 2022-04-25 PROCEDURE — G0442 ANNUAL ALCOHOL SCREEN 15 MIN: CPT

## 2022-04-25 PROCEDURE — G0444 DEPRESSION SCREEN ANNUAL: CPT | Mod: 59

## 2022-04-25 PROCEDURE — 99395 PREV VISIT EST AGE 18-39: CPT | Mod: 25

## 2022-04-25 NOTE — PHYSICAL EXAM
[No Acute Distress] : no acute distress [Well Nourished] : well nourished [Well Developed] : well developed [Well-Appearing] : well-appearing [Normal Sclera/Conjunctiva] : normal sclera/conjunctiva [PERRL] : pupils equal round and reactive to light [EOMI] : extraocular movements intact [Normal Outer Ear/Nose] : the outer ears and nose were normal in appearance [Normal Oropharynx] : the oropharynx was normal [No JVD] : no jugular venous distention [Supple] : supple [No Lymphadenopathy] : no lymphadenopathy [Thyroid Normal, No Nodules] : the thyroid was normal and there were no nodules present [No Respiratory Distress] : no respiratory distress  [Clear to Auscultation] : lungs were clear to auscultation bilaterally [No Accessory Muscle Use] : no accessory muscle use [Normal Rate] : normal rate  [Regular Rhythm] : with a regular rhythm [Normal S1, S2] : normal S1 and S2 [No Murmur] : no murmur heard [No Carotid Bruits] : no carotid bruits [No Abdominal Bruit] : a ~M bruit was not heard ~T in the abdomen [No Varicosities] : no varicosities [Pedal Pulses Present] : the pedal pulses are present [No Edema] : there was no peripheral edema [No Extremity Clubbing/Cyanosis] : no extremity clubbing/cyanosis [No Palpable Aorta] : no palpable aorta [Soft] : abdomen soft [Non Tender] : non-tender [Non-distended] : non-distended [No Masses] : no abdominal mass palpated [Normal Bowel Sounds] : normal bowel sounds [No HSM] : no HSM [Normal Posterior Cervical Nodes] : no posterior cervical lymphadenopathy [Normal Anterior Cervical Nodes] : no anterior cervical lymphadenopathy [No CVA Tenderness] : no CVA  tenderness [No Spinal Tenderness] : no spinal tenderness [No Joint Swelling] : no joint swelling [Grossly Normal Strength/Tone] : grossly normal strength/tone [Normal Gait] : normal gait [Acne] : acne [Coordination Grossly Intact] : coordination grossly intact [No Focal Deficits] : no focal deficits [Deep Tendon Reflexes (DTR)] : deep tendon reflexes were 2+ and symmetric [Normal Insight/Judgement] : insight and judgment were intact [Normal Affect] : the affect was normal [de-identified] : By GYN [de-identified] : obese [de-identified] : Mild acne-improved

## 2022-04-25 NOTE — ASSESSMENT
[FreeTextEntry1] : 31-year-old female with history of hyperthyroidism secondary to Graves' disease status post total thyroidectomy May 2019 and now with postsurgical hypothyroidism on levothyroxine.\par Recent blood work by endocrinology shows the patient still hypothyroid on levothyroxine.\par \par Patient with history of depression but currently seems good and she feels she's good in this way.\par Still some reactive depression in response to her obesity but she has lost 40 pounds which is encouraging and encourage her to continue and do more for further weight loss.\par \par Patient also continues to be an every day smoker with decrease to 1/2 ppd.\par Again long discussion about need to quit and risks to her general health over time\par \par Significant lifestyle changes patient needs to make discussed in length.\par Recommend starting with a good dietary program such as Weight Watchers with regular exercise and losing weight over 2 months and then setting a date to quit smoking cigarettes.\par \par Status post fall Feb 2021 with avulsion fracture of the right knee medial patellofemoral ligament. \par \par Tdap December 2019\par \par Patient received a J&J COVID vaccine x 1 and encouraged to get the booster\par \par gYN follow up as scheduled\par \par Followup with specialists as scheduled\par Labs to be done via order from endocrinology\par Followup yearly and as needed

## 2022-04-25 NOTE — REVIEW OF SYSTEMS
[Fatigue] : fatigue [Recent Change In Weight] : ~T recent weight change [Palpitations] : palpitations [Negative] : Heme/Lymph [Chest Pain] : no chest pain [Lower Ext Edema] : no lower extremity edema [de-identified] : Acne
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
## 2022-04-25 NOTE — HEALTH RISK ASSESSMENT
[Current] : Current [Yes] : Yes [1 or 2 (0 pts)] : 1 or 2 (0 points) [Never (0 pts)] : Never (0 points) [No] : In the past 12 months have you used drugs other than those required for medical reasons? No [No falls in past year] : Patient reported no falls in the past year [0] : 2) Feeling down, depressed, or hopeless: Not at all (0) [None] : None [With Significant Other] : lives with significant other [With Family] : lives with family [# of Members in Household ___] :  household currently consist of [unfilled] member(s) [Employed] : employed [College] : College [Single] : single [# Of Children ___] : has [unfilled] children [Sexually Active] : sexually active [Feels Safe at Home] : Feels safe at home [Fully functional (bathing, dressing, toileting, transferring, walking, feeding)] : Fully functional (bathing, dressing, toileting, transferring, walking, feeding) [Fully functional (using the telephone, shopping, preparing meals, housekeeping, doing laundry, using] : Fully functional and needs no help or supervision to perform IADLs (using the telephone, shopping, preparing meals, housekeeping, doing laundry, using transportation, managing medications and managing finances) [Smoke Detector] : smoke detector [Seat Belt] :  uses seat belt [Carbon Monoxide Detector] : carbon monoxide detector [Sunscreen] : uses sunscreen [Discussed at today's visit] : Advance Directives Discussed at today's visit [Good] : ~his/her~ current health as good [Very Good] : ~his/her~  mood as very good [Monthly or less (1 pt)] : Monthly or less (1 point) [PHQ-2 Negative - No further assessment needed] : PHQ-2 Negative - No further assessment needed [Patient reported PAP Smear was normal] : Patient reported PAP Smear was normal [Designated Healthcare Proxy] : Designated healthcare proxy [Name: ___] : Health Care Proxy's Name: [unfilled]  [de-identified] : no exercise [Audit-CScore] : 1 [de-identified] : not good [DVC5Irrck] : 0 [Change in mental status noted] : No change in mental status noted [Reports changes in hearing] : Reports no changes in hearing [Reports changes in vision] : Reports no changes in vision [Reports changes in dental health] : Reports no changes in dental health [PapSmearDate] : 06/21 [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

## 2022-04-27 LAB
APPEARANCE: CLEAR
BASOPHILS # BLD AUTO: 0.03 K/UL
BASOPHILS NFR BLD AUTO: 0.5 %
BILIRUBIN URINE: NEGATIVE
BLOOD URINE: NORMAL
COLOR: NORMAL
CORTIS SERPL-MCNC: 4.7 UG/DL
CREAT SPEC-SCNC: 136 MG/DL
EOSINOPHIL # BLD AUTO: 0.2 K/UL
EOSINOPHIL NFR BLD AUTO: 3 %
GLUCOSE QUALITATIVE U: NEGATIVE
HCT VFR BLD CALC: 41.8 %
HGB BLD-MCNC: 13.8 G/DL
IMM GRANULOCYTES NFR BLD AUTO: 0.2 %
KETONES URINE: NEGATIVE
LEUKOCYTE ESTERASE URINE: NEGATIVE
LYMPHOCYTES # BLD AUTO: 2.63 K/UL
LYMPHOCYTES NFR BLD AUTO: 39.7 %
MAN DIFF?: NORMAL
MCHC RBC-ENTMCNC: 28.3 PG
MCHC RBC-ENTMCNC: 33 GM/DL
MCV RBC AUTO: 85.8 FL
MICROALBUMIN 24H UR DL<=1MG/L-MCNC: <1.2 MG/DL
MICROALBUMIN/CREAT 24H UR-RTO: NORMAL MG/G
MONOCYTES # BLD AUTO: 0.42 K/UL
MONOCYTES NFR BLD AUTO: 6.3 %
NEUTROPHILS # BLD AUTO: 3.33 K/UL
NEUTROPHILS NFR BLD AUTO: 50.3 %
NITRITE URINE: NEGATIVE
PH URINE: 6
PLATELET # BLD AUTO: 234 K/UL
PROTEIN URINE: NORMAL
RBC # BLD: 4.87 M/UL
RBC # FLD: 13.2 %
SPECIFIC GRAVITY URINE: 1.02
UROBILINOGEN URINE: NORMAL
WBC # FLD AUTO: 6.62 K/UL

## 2022-04-27 NOTE — DISCUSSION/SUMMARY
[FreeTextEntry1] : Spoke to patient about recent lab results. TSH <0.01, FT4 2.5. Decrease Synthroid to 175 mcg 1.5 tablets QD. Repeat tft's in 4 weeks and adjust mediation further if needed. Vit D 15.7, restart Vit D 87941 units once a week. Pt verbalized understanding

## 2022-04-27 NOTE — PHYSICAL EXAM
[Alert] : alert [Normal Sclera/Conjunctiva] : normal sclera/conjunctiva [Normal Hearing] : hearing was normal [No Respiratory Distress] : no respiratory distress [Clear to Auscultation] : lungs were clear to auscultation bilaterally [Normal S1, S2] : normal S1 and S2 [Normal Rate] : heart rate was normal [No Stigmata of Cushings Syndrome] : no stigmata of Cushings Syndrome [Normal Gait] : normal gait [Oriented x3] : oriented to person, place, and time [de-identified] : /68  HR HR 68

## 2022-04-27 NOTE — HISTORY OF PRESENT ILLNESS
[FreeTextEntry1] : Pt here follow up post op total thyroidectomy Graves disease (2019)\par \par on calcitriol 0.5 mcg bid \par \par \par had COvid Vaccine x 2 \par \par \par Vit D 50 K every other week \par on 0.175 mg 2 tab qd Synthroid\par \par still smokimg - 1 ppd \par \par \par

## 2022-04-27 NOTE — ASSESSMENT
[FreeTextEntry1] : Graves disease post op total thyroidectomy \par on 0.175 mg X 2 Synthroid- tolerating it well\par cont RX \par \par Hypocalcemia post-op - Calcitrol 0.5 mcg BID\par \par cont Vit D 50 K q2week \par \par Acne\par -follow up with dermatology\par check hormonal levels \par \par Obesity -\par Increase diet and exercise efforts \par  not planning pregnancy \par \par \par RTC 3-4 month

## 2022-05-06 LAB
25(OH)D3 SERPL-MCNC: 25.8 NG/ML
ACTH SER-ACNC: 4 PG/ML
ALBUMIN SERPL ELPH-MCNC: 4.4 G/DL
ALP BLD-CCNC: 73 U/L
ALT SERPL-CCNC: 12 U/L
ANION GAP SERPL CALC-SCNC: 15 MMOL/L
AST SERPL-CCNC: 16 U/L
BILIRUB SERPL-MCNC: 0.2 MG/DL
BUN SERPL-MCNC: 11 MG/DL
CALCIUM SERPL-MCNC: 8.9 MG/DL
CHLORIDE SERPL-SCNC: 104 MMOL/L
CHOLEST SERPL-MCNC: 168 MG/DL
CO2 SERPL-SCNC: 21 MMOL/L
CREAT SERPL-MCNC: 0.57 MG/DL
EGFR: 126 ML/MIN/1.73M2
ESTIMATED AVERAGE GLUCOSE: 108 MG/DL
ESTRADIOL SERPL-MCNC: 73 PG/ML
FSH SERPL-MCNC: 7.2 IU/L
GH SERPL-MCNC: 0.65 NG/ML
GLUCOSE SERPL-MCNC: 97 MG/DL
HBA1C MFR BLD HPLC: 5.4 %
HDLC SERPL-MCNC: 51 MG/DL
IGF-1 INTERP: NORMAL
IGF-I BLD-MCNC: 176 NG/ML
LDLC SERPL CALC-MCNC: 104 MG/DL
LH SERPL-ACNC: 7.7 IU/L
MAGNESIUM SERPL-MCNC: 1.8 MG/DL
NONHDLC SERPL-MCNC: 117 MG/DL
PHOSPHATE SERPL-MCNC: 3.9 MG/DL
POTASSIUM SERPL-SCNC: 4.3 MMOL/L
PROLACTIN SERPL-MCNC: 11.2 NG/ML
PROLACTIN SERPL-MCNC: 11.7 NG/ML
PROLACTIN SERPL-MCNC: 11.7 NG/ML
PROT SERPL-MCNC: 6.9 G/DL
SHBG SERPL-SCNC: 69.8 NMOL/L
SODIUM SERPL-SCNC: 139 MMOL/L
T3FREE SERPL-MCNC: 3.57 PG/ML
T4 FREE SERPL-MCNC: 2 NG/DL
TESTOST FREE SERPL-MCNC: 2.5 PG/ML
TESTOST SERPL-MCNC: 32.2 NG/DL
TRIGL SERPL-MCNC: 66 MG/DL
TSH SERPL-ACNC: <0.01 UIU/ML
VIT B12 SERPL-MCNC: 323 PG/ML

## 2022-05-08 LAB
17OHP SERPL-MCNC: 23 NG/DL
DHEA-SULFATE, SERUM: 177 UG/DL

## 2022-05-18 ENCOUNTER — APPOINTMENT (OUTPATIENT)
Dept: INTERNAL MEDICINE | Facility: CLINIC | Age: 32
End: 2022-05-18
Payer: MEDICAID

## 2022-05-18 VITALS
WEIGHT: 255 LBS | RESPIRATION RATE: 13 BRPM | HEART RATE: 97 BPM | SYSTOLIC BLOOD PRESSURE: 120 MMHG | OXYGEN SATURATION: 98 % | BODY MASS INDEX: 38.65 KG/M2 | HEIGHT: 68 IN | DIASTOLIC BLOOD PRESSURE: 80 MMHG | TEMPERATURE: 97 F

## 2022-05-18 PROCEDURE — 99214 OFFICE O/P EST MOD 30 MIN: CPT | Mod: 25

## 2022-05-18 RX ORDER — PROPRANOLOL HYDROCHLORIDE 20 MG/1
20 TABLET ORAL
Refills: 0 | Status: ACTIVE | COMMUNITY

## 2022-05-19 ENCOUNTER — NON-APPOINTMENT (OUTPATIENT)
Age: 32
End: 2022-05-19

## 2022-05-19 LAB
ALBUMIN SERPL ELPH-MCNC: 4.4 G/DL
ALP BLD-CCNC: 68 U/L
ALT SERPL-CCNC: 11 U/L
ANION GAP SERPL CALC-SCNC: 12 MMOL/L
AST SERPL-CCNC: 17 U/L
BASOPHILS # BLD AUTO: 0.03 K/UL
BASOPHILS NFR BLD AUTO: 0.3 %
BILIRUB SERPL-MCNC: 0.2 MG/DL
BUN SERPL-MCNC: 15 MG/DL
CALCIUM SERPL-MCNC: 8.9 MG/DL
CHLORIDE SERPL-SCNC: 105 MMOL/L
CO2 SERPL-SCNC: 19 MMOL/L
CREAT SERPL-MCNC: 0.71 MG/DL
EGFR: 118 ML/MIN/1.73M2
EOSINOPHIL # BLD AUTO: 0.34 K/UL
EOSINOPHIL NFR BLD AUTO: 3.3 %
GLUCOSE SERPL-MCNC: 84 MG/DL
HCT VFR BLD CALC: 40.7 %
HGB BLD-MCNC: 13.2 G/DL
IMM GRANULOCYTES NFR BLD AUTO: 0.3 %
LYMPHOCYTES # BLD AUTO: 3.62 K/UL
LYMPHOCYTES NFR BLD AUTO: 35.7 %
MAN DIFF?: NORMAL
MCHC RBC-ENTMCNC: 28 PG
MCHC RBC-ENTMCNC: 32.4 GM/DL
MCV RBC AUTO: 86.2 FL
MONOCYTES # BLD AUTO: 0.65 K/UL
MONOCYTES NFR BLD AUTO: 6.4 %
NEUTROPHILS # BLD AUTO: 5.48 K/UL
NEUTROPHILS NFR BLD AUTO: 54 %
PLATELET # BLD AUTO: 255 K/UL
POTASSIUM SERPL-SCNC: 4.3 MMOL/L
PROT SERPL-MCNC: 7.1 G/DL
RBC # BLD: 4.72 M/UL
RBC # FLD: 13.4 %
SARS-COV-2 N GENE NPH QL NAA+PROBE: NOT DETECTED
SODIUM SERPL-SCNC: 137 MMOL/L
T4 FREE SERPL-MCNC: 2.9 NG/DL
TSH SERPL-ACNC: <0.01 UIU/ML
WBC # FLD AUTO: 10.15 K/UL

## 2022-05-19 NOTE — ASSESSMENT
[FreeTextEntry1] : Venipuncture done in our office, Labs sent out.COVID swab sent out. Further recommendations pending blood work. Discussed brain imaging. Patient will return to the office as scheduled for regular followup\par \par Dr. Aldana was present in office building while I examined patient\par

## 2022-05-19 NOTE — ADDENDUM
[FreeTextEntry1] : labs show\par -lymphocyte count elevated at 3.6, check one month\par - TSH level decreased at < 0.01 with elevated free T4 at 2.9= to discuss with endocrinology ASAP, results faxed

## 2022-05-19 NOTE — DATA REVIEWED
[FreeTextEntry1] : of note, patient had blood work done with endocrinology recently showing overactive thyroid and her levothyroxine was increased\par \par ?????? why increased/dose doubled

## 2022-05-19 NOTE — HISTORY OF PRESENT ILLNESS
[FreeTextEntry8] : Patient presents complaining of not feeling well for 4 days. Patient is complaining of headache/dizziness, vision = normal. Patient states if she bends down then she feels as though the symptoms are slightly worse. Patient has no chest pain/palpitations/dyspnea/sinus pressure/covid exposure. Patient has taken Tylenol/Zyrtec. Patient feels slightly better today.

## 2022-05-23 ENCOUNTER — APPOINTMENT (OUTPATIENT)
Dept: DERMATOLOGY | Facility: CLINIC | Age: 32
End: 2022-05-23
Payer: MEDICAID

## 2022-05-23 PROCEDURE — 99213 OFFICE O/P EST LOW 20 MIN: CPT

## 2022-06-02 ENCOUNTER — RX RENEWAL (OUTPATIENT)
Age: 32
End: 2022-06-02

## 2022-07-01 ENCOUNTER — APPOINTMENT (OUTPATIENT)
Dept: INTERNAL MEDICINE | Facility: CLINIC | Age: 32
End: 2022-07-01

## 2022-07-01 VITALS
DIASTOLIC BLOOD PRESSURE: 80 MMHG | SYSTOLIC BLOOD PRESSURE: 124 MMHG | HEART RATE: 88 BPM | BODY MASS INDEX: 36.95 KG/M2 | WEIGHT: 243 LBS | RESPIRATION RATE: 12 BRPM

## 2022-07-01 VITALS
HEIGHT: 68 IN | TEMPERATURE: 97 F | HEART RATE: 102 BPM | OXYGEN SATURATION: 98 % | WEIGHT: 243 LBS | BODY MASS INDEX: 36.83 KG/M2

## 2022-07-01 DIAGNOSIS — D72.820 LYMPHOCYTOSIS (SYMPTOMATIC): ICD-10-CM

## 2022-07-01 PROCEDURE — 36415 COLL VENOUS BLD VENIPUNCTURE: CPT

## 2022-07-01 PROCEDURE — 99213 OFFICE O/P EST LOW 20 MIN: CPT | Mod: 25

## 2022-07-09 LAB
BASOPHILS # BLD AUTO: 0.05 K/UL
BASOPHILS NFR BLD AUTO: 0.5 %
EOSINOPHIL # BLD AUTO: 0.29 K/UL
EOSINOPHIL NFR BLD AUTO: 2.9 %
HCT VFR BLD CALC: 43.5 %
HGB BLD-MCNC: 14 G/DL
IMM GRANULOCYTES NFR BLD AUTO: 0.3 %
LYMPHOCYTES # BLD AUTO: 3.46 K/UL
LYMPHOCYTES NFR BLD AUTO: 35.2 %
MAN DIFF?: NORMAL
MCHC RBC-ENTMCNC: 28.9 PG
MCHC RBC-ENTMCNC: 32.2 GM/DL
MCV RBC AUTO: 89.7 FL
MONOCYTES # BLD AUTO: 0.56 K/UL
MONOCYTES NFR BLD AUTO: 5.7 %
NEUTROPHILS # BLD AUTO: 5.45 K/UL
NEUTROPHILS NFR BLD AUTO: 55.4 %
PLATELET # BLD AUTO: 283 K/UL
RBC # BLD: 4.85 M/UL
RBC # FLD: 13.2 %
T4 FREE SERPL-MCNC: 2.5 NG/DL
TSH SERPL-ACNC: <0.01 UIU/ML
WBC # FLD AUTO: 9.84 K/UL

## 2022-07-09 NOTE — ADDENDUM
[FreeTextEntry1] : Labs show\par -lymphs of 3.46 with normal FC, monitor CBC\par -TSH low with elevated Ft4 as in past=faxed to endo but again feel levothyroxine needs to be decreased further

## 2022-07-09 NOTE — HISTORY OF PRESENT ILLNESS
[FreeTextEntry1] : followup [de-identified] : Patient presents to repeat labs, see prior note as patient was found to have lymphs elevated at 3.6 and TSH low with increased Ft4=med dose was adjusted, taking RX 6 days per week and skipping sunday per endo\par -Abnormal results discussed with patient questions answered\par -got covid vaccines\par -stressed, tited, overworked :(

## 2022-07-09 NOTE — ASSESSMENT
[FreeTextEntry1] : Venipuncture done in our office, Labs sent out. Patient advised to continue present medications with diet/exercise and specialists followup. Patient  will return to the office as sched for reg check\par \par \par cigs+\par MG @ 41 y/o\par did not get flu shot this year, +covid vaccine\par MD's\par 1.Endocrine-Dr. Mendez\par 2. Ortho-Dr. Malik\par 3.GYN- "not in years" "to do"\par 4.EYE MD "to do"\par 5.Cardio-Dr. Feliz\par

## 2022-07-11 ENCOUNTER — NON-APPOINTMENT (OUTPATIENT)
Age: 32
End: 2022-07-11

## 2022-07-11 RX ORDER — LEVOTHYROXINE SODIUM 0.3 MG/1
300 TABLET ORAL
Qty: 90 | Refills: 1 | Status: DISCONTINUED | COMMUNITY
Start: 2019-08-16 | End: 2022-07-11

## 2022-07-14 ENCOUNTER — APPOINTMENT (OUTPATIENT)
Dept: INTERNAL MEDICINE | Facility: CLINIC | Age: 32
End: 2022-07-14

## 2022-08-02 ENCOUNTER — LABORATORY RESULT (OUTPATIENT)
Age: 32
End: 2022-08-02

## 2022-08-09 ENCOUNTER — RX RENEWAL (OUTPATIENT)
Age: 32
End: 2022-08-09

## 2022-08-23 ENCOUNTER — APPOINTMENT (OUTPATIENT)
Dept: DERMATOLOGY | Facility: CLINIC | Age: 32
End: 2022-08-23

## 2022-08-23 ENCOUNTER — NON-APPOINTMENT (OUTPATIENT)
Age: 32
End: 2022-08-23

## 2022-08-23 PROCEDURE — 99214 OFFICE O/P EST MOD 30 MIN: CPT

## 2022-08-24 ENCOUNTER — NON-APPOINTMENT (OUTPATIENT)
Age: 32
End: 2022-08-24

## 2022-08-24 ENCOUNTER — LABORATORY RESULT (OUTPATIENT)
Age: 32
End: 2022-08-24

## 2022-09-02 ENCOUNTER — APPOINTMENT (OUTPATIENT)
Dept: ENDOCRINOLOGY | Facility: CLINIC | Age: 32
End: 2022-09-02

## 2022-10-07 ENCOUNTER — RX RENEWAL (OUTPATIENT)
Age: 32
End: 2022-10-07

## 2022-10-13 PROBLEM — Z13.31 SCREENING FOR DEPRESSION: Status: ACTIVE | Noted: 2022-04-25

## 2022-11-23 ENCOUNTER — APPOINTMENT (OUTPATIENT)
Dept: DERMATOLOGY | Facility: CLINIC | Age: 32
End: 2022-11-23

## 2022-11-23 PROCEDURE — 99214 OFFICE O/P EST MOD 30 MIN: CPT

## 2023-02-16 ENCOUNTER — APPOINTMENT (OUTPATIENT)
Dept: INTERNAL MEDICINE | Facility: CLINIC | Age: 33
End: 2023-02-16

## 2023-02-16 ENCOUNTER — OUTPATIENT (OUTPATIENT)
Dept: OUTPATIENT SERVICES | Facility: HOSPITAL | Age: 33
LOS: 1 days | End: 2023-02-16
Payer: MEDICAID

## 2023-02-16 ENCOUNTER — APPOINTMENT (OUTPATIENT)
Dept: INTERNAL MEDICINE | Facility: CLINIC | Age: 33
End: 2023-02-16
Payer: MEDICAID

## 2023-02-16 ENCOUNTER — APPOINTMENT (OUTPATIENT)
Dept: CT IMAGING | Facility: CLINIC | Age: 33
End: 2023-02-16
Payer: MEDICAID

## 2023-02-16 ENCOUNTER — RESULT REVIEW (OUTPATIENT)
Age: 33
End: 2023-02-16

## 2023-02-16 VITALS
DIASTOLIC BLOOD PRESSURE: 80 MMHG | BODY MASS INDEX: 40.01 KG/M2 | HEIGHT: 68 IN | WEIGHT: 264 LBS | RESPIRATION RATE: 14 BRPM | OXYGEN SATURATION: 98 % | HEART RATE: 97 BPM | SYSTOLIC BLOOD PRESSURE: 125 MMHG

## 2023-02-16 DIAGNOSIS — K62.5 HEMORRHAGE OF ANUS AND RECTUM: ICD-10-CM

## 2023-02-16 DIAGNOSIS — R10.31 RIGHT LOWER QUADRANT PAIN: ICD-10-CM

## 2023-02-16 LAB
DATE COLLECTED: NORMAL
HEMOCCULT SP1 STL QL: NEGATIVE
QUALITY CONTROL: YES

## 2023-02-16 PROCEDURE — 74176 CT ABD & PELVIS W/O CONTRAST: CPT | Mod: 26

## 2023-02-16 PROCEDURE — 99214 OFFICE O/P EST MOD 30 MIN: CPT | Mod: 25

## 2023-02-16 PROCEDURE — 74176 CT ABD & PELVIS W/O CONTRAST: CPT

## 2023-02-16 PROCEDURE — 82272 OCCULT BLD FECES 1-3 TESTS: CPT

## 2023-02-17 ENCOUNTER — TRANSCRIPTION ENCOUNTER (OUTPATIENT)
Age: 33
End: 2023-02-17

## 2023-02-17 ENCOUNTER — NON-APPOINTMENT (OUTPATIENT)
Age: 33
End: 2023-02-17

## 2023-02-17 LAB
ALBUMIN SERPL ELPH-MCNC: 4.5 G/DL
ALP BLD-CCNC: 62 U/L
ALT SERPL-CCNC: 18 U/L
ANION GAP SERPL CALC-SCNC: 13 MMOL/L
AST SERPL-CCNC: 22 U/L
BASOPHILS # BLD AUTO: 0.04 K/UL
BASOPHILS NFR BLD AUTO: 0.5 %
BILIRUB SERPL-MCNC: <0.2 MG/DL
BUN SERPL-MCNC: 8 MG/DL
CALCIUM SERPL-MCNC: 8.9 MG/DL
CHLORIDE SERPL-SCNC: 101 MMOL/L
CO2 SERPL-SCNC: 23 MMOL/L
CREAT SERPL-MCNC: 0.69 MG/DL
EGFR: 120 ML/MIN/1.73M2
EOSINOPHIL # BLD AUTO: 0.32 K/UL
EOSINOPHIL NFR BLD AUTO: 3.6 %
GLUCOSE SERPL-MCNC: 81 MG/DL
HCT VFR BLD CALC: 40.5 %
HGB BLD-MCNC: 13.4 G/DL
IMM GRANULOCYTES NFR BLD AUTO: 0.2 %
LYMPHOCYTES # BLD AUTO: 3.44 K/UL
LYMPHOCYTES NFR BLD AUTO: 39 %
MAN DIFF?: NORMAL
MCHC RBC-ENTMCNC: 30.2 PG
MCHC RBC-ENTMCNC: 33.1 GM/DL
MCV RBC AUTO: 91.4 FL
MONOCYTES # BLD AUTO: 0.47 K/UL
MONOCYTES NFR BLD AUTO: 5.3 %
NEUTROPHILS # BLD AUTO: 4.53 K/UL
NEUTROPHILS NFR BLD AUTO: 51.4 %
PLATELET # BLD AUTO: 227 K/UL
POTASSIUM SERPL-SCNC: 4.6 MMOL/L
PROT SERPL-MCNC: 7.1 G/DL
RBC # BLD: 4.43 M/UL
RBC # FLD: 12.6 %
SODIUM SERPL-SCNC: 137 MMOL/L
T4 FREE SERPL-MCNC: 1.7 NG/DL
TSH SERPL-ACNC: 0.47 UIU/ML
WBC # FLD AUTO: 8.82 K/UL

## 2023-02-17 NOTE — ASSESSMENT
[FreeTextEntry1] : Venipuncture done in our office, Labs sent out.CT A/P ordered. GI referral and apt made for pt. Further recs to followup. Report any recurrence. RTO as sched for reg check\par \par \par \par \par Dr. Aldana was present in office building while I examined patient\par

## 2023-02-17 NOTE — PHYSICAL EXAM
[No Acute Distress] : no acute distress [No Respiratory Distress] : no respiratory distress  [Clear to Auscultation] : lungs were clear to auscultation bilaterally [Normal Rate] : normal rate  [Regular Rhythm] : with a regular rhythm [Normal Affect] : the affect was normal [Normal Mood] : the mood was normal [Soft] : abdomen soft [Normal Bowel Sounds] : normal bowel sounds [No Mass] : no mass [Stool Occult Blood] : stool negative for occult blood [de-identified] : +mild pain lower abdomen w/o rebound

## 2023-02-17 NOTE — HISTORY OF PRESENT ILLNESS
[FreeTextEntry8] : Patient presents stating on Friday she felt constipated  and was pushing to the point where she became sweaty/nauseous and felt like she was going to faint.  Patient noticed bright red blood in the toilet water Friday, Saturday and has not noticed bleeding since Sunday.  Patient had lower abdominal cramping/pain.  Patient did move her bowels today.  Patient generally moves her bowels regularly.

## 2023-02-21 ENCOUNTER — NON-APPOINTMENT (OUTPATIENT)
Age: 33
End: 2023-02-21

## 2023-02-27 ENCOUNTER — NON-APPOINTMENT (OUTPATIENT)
Age: 33
End: 2023-02-27

## 2023-02-27 ENCOUNTER — APPOINTMENT (OUTPATIENT)
Dept: GASTROENTEROLOGY | Facility: CLINIC | Age: 33
End: 2023-02-27
Payer: MEDICAID

## 2023-02-27 VITALS
HEIGHT: 68 IN | HEART RATE: 81 BPM | SYSTOLIC BLOOD PRESSURE: 112 MMHG | TEMPERATURE: 97.8 F | OXYGEN SATURATION: 97 % | WEIGHT: 264 LBS | RESPIRATION RATE: 16 BRPM | BODY MASS INDEX: 40.01 KG/M2 | DIASTOLIC BLOOD PRESSURE: 80 MMHG

## 2023-02-27 DIAGNOSIS — K59.00 CONSTIPATION, UNSPECIFIED: ICD-10-CM

## 2023-02-27 PROCEDURE — 99203 OFFICE O/P NEW LOW 30 MIN: CPT

## 2023-02-27 NOTE — HISTORY OF PRESENT ILLNESS
[FreeTextEntry1] : The patient has arrived for a consultation visit.  He recently had a travel to [place] with her son for a basketball game.  She got constipated and then had to strain and also had lower abdominal discomfort.  She had to manually disimpact her and led to rectal bleeding.  She has some loose stools after that.  Rectal bleeding has abated.  Evaluated by primary physician and underwent rectal exam and the stool occult was negative.  Underwent CT abdomen and pelvis which was unimpressive.  No prior history of any issues.  No history of rectal bleeding in the past.  No weight loss.  No family history of colon polyp or colon cancer.  No previous colonoscopy.

## 2023-02-27 NOTE — PHYSICAL EXAM
[Alert] : alert [Normal Voice/Communication] : normal voice/communication [Healthy Appearing] : healthy appearing [No Acute Distress] : no acute distress [Sclera] : the sclera and conjunctiva were normal [Hearing Threshold Finger Rub Not [place]] : hearing was normal [Normal Lips/Gums] : the lips and gums were normal [Oropharynx] : the oropharynx was normal [Normal Appearance] : the appearance of the neck was normal [No Neck Mass] : no neck mass was observed [No Respiratory Distress] : no respiratory distress [No Acc Muscle Use] : no accessory muscle use [Respiration, Rhythm And Depth] : normal respiratory rhythm and effort [Auscultation Breath Sounds / Voice Sounds] : lungs were clear to auscultation bilaterally [Heart Rate And Rhythm] : heart rate was normal and rhythm regular [Normal S1, S2] : normal S1 and S2 [Murmurs] : no murmurs [Bowel Sounds] : normal bowel sounds [Abdomen Tenderness] : non-tender [No Masses] : no abdominal mass palpated [Abdomen Soft] : soft [] : no hepatosplenomegaly [Oriented To Time, Place, And Person] : oriented to person, place, and time [Normal Sphincter Tone] : normal sphincter tone [No External Hemorrhoid] : no external hemorrhoids [No Rectal Mass] : no rectal mass [Chaperone Present: ____] : chaperone present: [unfilled]

## 2023-02-27 NOTE — ASSESSMENT
[FreeTextEntry1] : The patient most likely had acute onset of constipation and with digital manual disimpaction, may have led to some rectal bleeding.  This has abated.  I have recommended to start taking fiber Gummies on a daily basis for at least 1 month and then slowly taper it off.  Discussed at length regarding the dietary precautions while traveling to avoid constipation.  If she has any symptoms, she will call me back.\par \par Devlis Ramos MD\par Gastroenterology \par \par

## 2023-03-03 NOTE — ED ADULT TRIAGE NOTE - HEIGHT IN CM
172.72 Itraconazole Counseling:  I discussed with the patient the risks of itraconazole including but not limited to liver damage, nausea/vomiting, neuropathy, and severe allergy.  The patient understands that this medication is best absorbed when taken with acidic beverages such as non-diet cola or ginger ale.  The patient understands that monitoring is required including baseline LFTs and repeat LFTs at intervals.  The patient understands that they are to contact us or the primary physician if concerning signs are noted.

## 2023-03-08 ENCOUNTER — RX RENEWAL (OUTPATIENT)
Age: 33
End: 2023-03-08

## 2023-03-15 ENCOUNTER — APPOINTMENT (OUTPATIENT)
Dept: DERMATOLOGY | Facility: CLINIC | Age: 33
End: 2023-03-15
Payer: MEDICAID

## 2023-03-15 PROCEDURE — 99214 OFFICE O/P EST MOD 30 MIN: CPT

## 2023-03-16 ENCOUNTER — APPOINTMENT (OUTPATIENT)
Dept: INTERNAL MEDICINE | Facility: CLINIC | Age: 33
End: 2023-03-16
Payer: MEDICAID

## 2023-03-16 VITALS
TEMPERATURE: 98.3 F | RESPIRATION RATE: 14 BRPM | BODY MASS INDEX: 39.71 KG/M2 | SYSTOLIC BLOOD PRESSURE: 117 MMHG | HEIGHT: 68 IN | WEIGHT: 262 LBS | HEART RATE: 76 BPM | OXYGEN SATURATION: 98 % | DIASTOLIC BLOOD PRESSURE: 70 MMHG

## 2023-03-16 DIAGNOSIS — R52 PAIN, UNSPECIFIED: ICD-10-CM

## 2023-03-16 DIAGNOSIS — R05.9 COUGH, UNSPECIFIED: ICD-10-CM

## 2023-03-16 LAB — FLUAV SPEC QL CULT: NEGATIVE

## 2023-03-16 PROCEDURE — 99214 OFFICE O/P EST MOD 30 MIN: CPT | Mod: 25

## 2023-03-16 PROCEDURE — 87804 INFLUENZA ASSAY W/OPTIC: CPT | Mod: QW

## 2023-03-16 NOTE — PHYSICAL EXAM
[No Acute Distress] : no acute distress [Normal Sclera/Conjunctiva] : normal sclera/conjunctiva [Normal Outer Ear/Nose] : the outer ears and nose were normal in appearance [Normal Oropharynx] : the oropharynx was normal [Normal TMs] : both tympanic membranes were normal [No Lymphadenopathy] : no lymphadenopathy [No Respiratory Distress] : no respiratory distress  [No Accessory Muscle Use] : no accessory muscle use [Clear to Auscultation] : lungs were clear to auscultation bilaterally [Normal Rate] : normal rate  [Regular Rhythm] : with a regular rhythm [No Focal Deficits] : no focal deficits [de-identified] : Not ill-appearing

## 2023-03-16 NOTE — HISTORY OF PRESENT ILLNESS
[FreeTextEntry8] : The patient presents for an acute visit not feeling well starting today.\par She states for the past few days she has had minor head congestion but today felt much worse with symptoms including laryngitis without sore throat, headache, body aches, fatigue and slight cough.  Occasional mucus production.\par No chest pain, shortness of breath, fever, chills, GI symptoms.\par Taking over-the-counter remedies such as NyQuil and Mucinex.\par Has not tested for COVID.\par Fellow workers have had influenza.

## 2023-03-16 NOTE — ASSESSMENT
[FreeTextEntry1] : Patient signs and symptoms most compatible with viral illness therefore no antibiotic given.\par Rapid flu negative but still possible as well as possible COVID therefore nasal PCR done and sent to the lab.\par Plenty of rest and fluids recommended\par Also recommended continue over-the-counter remedies such as Mucinex DM and TheraFlu.\par Patient told to call if symptoms persist or worsen.

## 2023-03-17 ENCOUNTER — NON-APPOINTMENT (OUTPATIENT)
Age: 33
End: 2023-03-17

## 2023-03-17 LAB
INFLUENZA A RESULT: NOT DETECTED
INFLUENZA B RESULT: NOT DETECTED
RESP SYN VIRUS RESULT: NOT DETECTED
SARS-COV-2 RESULT: NOT DETECTED

## 2023-04-19 ENCOUNTER — APPOINTMENT (OUTPATIENT)
Dept: ENDOCRINOLOGY | Facility: CLINIC | Age: 33
End: 2023-04-19
Payer: MEDICAID

## 2023-04-19 VITALS
BODY MASS INDEX: 38.19 KG/M2 | SYSTOLIC BLOOD PRESSURE: 110 MMHG | DIASTOLIC BLOOD PRESSURE: 70 MMHG | WEIGHT: 252 LBS | HEIGHT: 68 IN

## 2023-04-19 PROCEDURE — 99214 OFFICE O/P EST MOD 30 MIN: CPT

## 2023-04-20 RX ORDER — GLUCOSA SU 2KCL/CHONDROITIN SU 500-400 MG
500 CAPSULE ORAL DAILY
Qty: 100 | Refills: 1 | Status: ACTIVE | COMMUNITY
Start: 2019-04-05 | End: 1900-01-01

## 2023-04-25 NOTE — REVIEW OF SYSTEMS
[Fatigue] : no fatigue [Recent Weight Loss (___ Lbs)] : recent weight loss: [unfilled] lbs [Visual Field Defect] : no visual field defect [Dysphagia] : no dysphagia [Neck Pain] : no neck pain [Chest Pain] : no chest pain [Palpitations] : no palpitations [Shortness Of Breath] : no shortness of breath [Nausea] : no nausea [Constipation] : no constipation [Vomiting] : no vomiting [Diarrhea] : no diarrhea [Polyuria] : no polyuria [Polydipsia] : polydipsia

## 2023-04-25 NOTE — HISTORY OF PRESENT ILLNESS
[FreeTextEntry1] : Pt here follow up post op total thyroidectomy Graves disease (2019)\par \par on calcitriol 0.5 mcg bid \par \par \par \par Vit D 50 K every other week \par on 0.175 mg 1 tab qd Synthroid\par \par still smokimg - 1 ppd \par \par \par been trying to lose weight \par \par \par started spironolactone for acne - but not counselled on pregnanyc prevention \par

## 2023-04-25 NOTE — PHYSICAL EXAM
[Alert] : alert [Normal Sclera/Conjunctiva] : normal sclera/conjunctiva [Normal Hearing] : hearing was normal [No Respiratory Distress] : no respiratory distress [Clear to Auscultation] : lungs were clear to auscultation bilaterally [Normal S1, S2] : normal S1 and S2 [Normal Rate] : heart rate was normal [No Stigmata of Cushings Syndrome] : no stigmata of Cushings Syndrome [Normal Gait] : normal gait [Oriented x3] : oriented to person, place, and time [de-identified] : /68  HR HR 68

## 2023-04-25 NOTE — ASSESSMENT
[FreeTextEntry1] : Graves disease post op total thyroidectomy \par on 0.175 mg qd Synthroid- tolerating it well\par cont RX \par check sono for residual \par \par Hypocalcemia post-op - Calcitrol 0.5 mcg BID\par \par cont Vit D 50 K q2week \par \par Acne/irregualr menses  Liekly PCOS- can consider MFN\par   pt on spironolacone -long dw pt - canot use evene if remote change of pregnancy- not using any contraception now \par \par pt will stop this  as is not using any form of contraception \par \par -follow up with dermatology\par check hormonal levels \par \par Obesity -\par Increase diet and exercise efforts \par \par \par RTC 3-4 month

## 2023-05-08 NOTE — COUNSELING
[Potential consequences of obesity discussed] : Potential consequences of obesity discussed [Benefits of weight loss discussed] : Benefits of weight loss discussed [Structured Weight Management Program suggested:] : Structured weight management program suggested [Encouraged to maintain food diary] : Encouraged to maintain food diary [Encouraged to increase physical activity] : Encouraged to increase physical activity [Healthy eating counseling provided] : healthy eating [Smoking cessation counseling provided] : smoking cessation [Low Fat Diet] : Low fat diet [Decrease Portions] : Decrease food portions [Walking] : Walking [None] : None [Needs reinforcement, provided] : Patient needs reinforcement on understanding lifestyle changes and  the steps needed to achieve self management goals and reinforcement was provided

## 2023-05-09 ENCOUNTER — APPOINTMENT (OUTPATIENT)
Dept: INTERNAL MEDICINE | Facility: CLINIC | Age: 33
End: 2023-05-09
Payer: MEDICAID

## 2023-05-09 ENCOUNTER — LABORATORY RESULT (OUTPATIENT)
Age: 33
End: 2023-05-09

## 2023-05-09 ENCOUNTER — NON-APPOINTMENT (OUTPATIENT)
Age: 33
End: 2023-05-09

## 2023-05-09 VITALS
HEIGHT: 68 IN | HEART RATE: 76 BPM | OXYGEN SATURATION: 98 % | DIASTOLIC BLOOD PRESSURE: 60 MMHG | SYSTOLIC BLOOD PRESSURE: 120 MMHG | BODY MASS INDEX: 36.98 KG/M2 | WEIGHT: 244 LBS

## 2023-05-09 DIAGNOSIS — R42 DIZZINESS AND GIDDINESS: ICD-10-CM

## 2023-05-09 DIAGNOSIS — S82.091A OTHER FRACTURE OF RIGHT PATELLA, INITIAL ENCOUNTER FOR CLOSED FRACTURE: ICD-10-CM

## 2023-05-09 DIAGNOSIS — R10.819 ABDOMINAL TENDERNESS, UNSPECIFIED SITE: ICD-10-CM

## 2023-05-09 DIAGNOSIS — R79.89 OTHER SPECIFIED ABNORMAL FINDINGS OF BLOOD CHEMISTRY: ICD-10-CM

## 2023-05-09 DIAGNOSIS — R19.4 CHANGE IN BOWEL HABIT: ICD-10-CM

## 2023-05-09 DIAGNOSIS — Z86.19 PERSONAL HISTORY OF OTHER INFECTIOUS AND PARASITIC DISEASES: ICD-10-CM

## 2023-05-09 DIAGNOSIS — R10.32 RIGHT LOWER QUADRANT PAIN: ICD-10-CM

## 2023-05-09 DIAGNOSIS — K92.1 MELENA: ICD-10-CM

## 2023-05-09 DIAGNOSIS — R09.81 NASAL CONGESTION: ICD-10-CM

## 2023-05-09 DIAGNOSIS — R68.89 OTHER GENERAL SYMPTOMS AND SIGNS: ICD-10-CM

## 2023-05-09 DIAGNOSIS — Z87.898 PERSONAL HISTORY OF OTHER SPECIFIED CONDITIONS: ICD-10-CM

## 2023-05-09 DIAGNOSIS — R22.9 LOCALIZED SWELLING, MASS AND LUMP, UNSPECIFIED: ICD-10-CM

## 2023-05-09 DIAGNOSIS — M23.91 UNSPECIFIED INTERNAL DERANGEMENT OF RIGHT KNEE: Chronic | ICD-10-CM

## 2023-05-09 DIAGNOSIS — R10.31 RIGHT LOWER QUADRANT PAIN: ICD-10-CM

## 2023-05-09 PROCEDURE — 93000 ELECTROCARDIOGRAM COMPLETE: CPT

## 2023-05-09 PROCEDURE — 99395 PREV VISIT EST AGE 18-39: CPT | Mod: 25

## 2023-05-09 RX ORDER — COVID-19 ANTIGEN TEST
KIT MISCELLANEOUS
Qty: 2 | Refills: 0 | Status: DISCONTINUED | COMMUNITY
Start: 2022-10-07 | End: 2023-05-09

## 2023-05-09 RX ORDER — OSELTAMIVIR PHOSPHATE 75 MG/1
75 CAPSULE ORAL
Qty: 10 | Refills: 0 | Status: DISCONTINUED | COMMUNITY
Start: 2022-11-02 | End: 2023-05-09
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
## 2023-05-09 NOTE — HEALTH RISK ASSESSMENT
[Good] : ~his/her~ current health as good [Very Good] : ~his/her~  mood as very good [Yes] : Yes [Monthly or less (1 pt)] : Monthly or less (1 point) [1 or 2 (0 pts)] : 1 or 2 (0 points) [Never (0 pts)] : Never (0 points) [No] : In the past 12 months have you used drugs other than those required for medical reasons? No [No falls in past year] : Patient reported no falls in the past year [0] : 2) Feeling down, depressed, or hopeless: Not at all (0) [PHQ-2 Negative - No further assessment needed] : PHQ-2 Negative - No further assessment needed [Patient reported PAP Smear was normal] : Patient reported PAP Smear was normal [None] : None [With Significant Other] : lives with significant other [With Family] : lives with family [# of Members in Household ___] :  household currently consist of [unfilled] member(s) [Employed] : employed [College] : College [Single] : single [# Of Children ___] : has [unfilled] children [Sexually Active] : sexually active [Feels Safe at Home] : Feels safe at home [Fully functional (bathing, dressing, toileting, transferring, walking, feeding)] : Fully functional (bathing, dressing, toileting, transferring, walking, feeding) [Fully functional (using the telephone, shopping, preparing meals, housekeeping, doing laundry, using] : Fully functional and needs no help or supervision to perform IADLs (using the telephone, shopping, preparing meals, housekeeping, doing laundry, using transportation, managing medications and managing finances) [Smoke Detector] : smoke detector [Carbon Monoxide Detector] : carbon monoxide detector [Seat Belt] :  uses seat belt [Sunscreen] : uses sunscreen [Current] : Current [Discussed at today's visit] : Advance Directives Discussed at today's visit [Designated Healthcare Proxy] : Designated healthcare proxy [Reviewed no changes] : Reviewed, no changes [Name: ___] : Health Care Proxy's Name: [unfilled]  [Audit-CScore] : 1 [de-identified] : no exercise [de-identified] : not good [NQS5Atkqs] : 0 [Change in mental status noted] : No change in mental status noted [Reports changes in hearing] : Reports no changes in hearing [Reports changes in vision] : Reports no changes in vision [Reports changes in dental health] : Reports no changes in dental health [PapSmearDate] : 03/23 [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

## 2023-05-09 NOTE — PHYSICAL EXAM

## 2023-05-09 NOTE — REVIEW OF SYSTEMS
[Fatigue] : fatigue [Recent Change In Weight] : ~T recent weight change [Palpitations] : palpitations [Negative] : Heme/Lymph [Chest Pain] : no chest pain [Lower Ext Edema] : no lower extremity edema [de-identified] : Acne

## 2023-05-15 ENCOUNTER — OUTPATIENT (OUTPATIENT)
Dept: OUTPATIENT SERVICES | Facility: HOSPITAL | Age: 33
LOS: 1 days | End: 2023-05-15
Payer: MEDICAID

## 2023-05-15 ENCOUNTER — APPOINTMENT (OUTPATIENT)
Dept: ULTRASOUND IMAGING | Facility: CLINIC | Age: 33
End: 2023-05-15

## 2023-05-15 DIAGNOSIS — E83.51 HYPOCALCEMIA: ICD-10-CM

## 2023-05-15 PROCEDURE — 76536 US EXAM OF HEAD AND NECK: CPT

## 2023-05-15 PROCEDURE — 76536 US EXAM OF HEAD AND NECK: CPT | Mod: 26

## 2023-05-25 ENCOUNTER — NON-APPOINTMENT (OUTPATIENT)
Age: 33
End: 2023-05-25

## 2023-06-01 ENCOUNTER — LABORATORY RESULT (OUTPATIENT)
Age: 33
End: 2023-06-01

## 2023-06-10 ENCOUNTER — NON-APPOINTMENT (OUTPATIENT)
Age: 33
End: 2023-06-10

## 2023-06-29 ENCOUNTER — APPOINTMENT (OUTPATIENT)
Dept: DERMATOLOGY | Facility: CLINIC | Age: 33
End: 2023-06-29

## 2023-07-19 ENCOUNTER — RX RENEWAL (OUTPATIENT)
Age: 33
End: 2023-07-19

## 2023-07-19 RX ORDER — CALCITRIOL 0.5 UG/1
0.5 CAPSULE, LIQUID FILLED ORAL
Qty: 60 | Refills: 3 | Status: ACTIVE | COMMUNITY
Start: 2019-05-23 | End: 1900-01-01

## 2023-07-31 ENCOUNTER — RX RENEWAL (OUTPATIENT)
Age: 33
End: 2023-07-31

## 2023-08-10 ENCOUNTER — RX RENEWAL (OUTPATIENT)
Age: 33
End: 2023-08-10

## 2023-08-31 ENCOUNTER — TRANSCRIPTION ENCOUNTER (OUTPATIENT)
Age: 33
End: 2023-08-31

## 2023-09-26 NOTE — H&P PST ADULT - ENDOCRINE
Procedure:  ROBOTIC SLEEVE; INTRAOP EGD (Abdomen)    Relevant Problems   CARDIO   (+) Benign essential HTN   (+) Moderate mixed hyperlipidemia not requiring statin therapy      GI/HEPATIC   (+) GERD (gastroesophageal reflux disease)      PULMONARY   (+) MAGGY (obstructive sleep apnea)      Endocrine   (+) Homozygous MTHFR mutation [id number]   (+) PCOS (polycystic ovarian syndrome)      Other   (+) At risk for sleep apnea   (+) CPAP (continuous positive airway pressure) dependence   (+) Glaucoma   (+) Obesity, Class II, BMI 35-39.9        Physical Exam    Airway    Mallampati score: III  TM Distance: >3 FB  Neck ROM: full     Dental   No notable dental hx     Cardiovascular  Rhythm: regular, Rate: normal, Cardiovascular exam normal    Pulmonary  Pulmonary exam normal Breath sounds clear to auscultation,     Other Findings        Anesthesia Plan  ASA Score- 3     Anesthesia Type- general with ASA Monitors. Additional Monitors:   Airway Plan:     Comment: SCOP patch order DC'd. Plan Factors-    Chart reviewed. EKG reviewed. Existing labs reviewed. Patient summary reviewed. Patient is not a current smoker. Patient not instructed to abstain from smoking on day of procedure. Patient did not smoke on day of surgery. There is medical exclusion for perioperative obstructive sleep apnea risk education. Induction- intravenous. Postoperative Plan- . Planned trial extubation    Informed Consent- Anesthetic plan and risks discussed with patient and spouse.
negative

## 2023-10-01 ENCOUNTER — NON-APPOINTMENT (OUTPATIENT)
Age: 33
End: 2023-10-01

## 2023-10-03 ENCOUNTER — NON-APPOINTMENT (OUTPATIENT)
Age: 33
End: 2023-10-03

## 2023-10-16 ENCOUNTER — APPOINTMENT (OUTPATIENT)
Dept: ENDOCRINOLOGY | Facility: CLINIC | Age: 33
End: 2023-10-16
Payer: MEDICAID

## 2023-10-16 VITALS
SYSTOLIC BLOOD PRESSURE: 128 MMHG | OXYGEN SATURATION: 97 % | BODY MASS INDEX: 36.68 KG/M2 | HEIGHT: 68 IN | DIASTOLIC BLOOD PRESSURE: 82 MMHG | WEIGHT: 242 LBS | HEART RATE: 79 BPM

## 2023-10-16 DIAGNOSIS — Z86.39 PERSONAL HISTORY OF OTHER ENDOCRINE, NUTRITIONAL AND METABOLIC DISEASE: ICD-10-CM

## 2023-10-16 DIAGNOSIS — R53.83 OTHER FATIGUE: ICD-10-CM

## 2023-10-16 PROCEDURE — 99214 OFFICE O/P EST MOD 30 MIN: CPT

## 2023-10-16 RX ORDER — TRETINOIN 1 MG/G
0.1 CREAM TOPICAL
Qty: 45 | Refills: 0 | Status: DISCONTINUED | COMMUNITY
Start: 2022-10-07 | End: 2023-10-16

## 2023-10-17 ENCOUNTER — NON-APPOINTMENT (OUTPATIENT)
Age: 33
End: 2023-10-17

## 2023-10-20 LAB
25(OH)D3 SERPL-MCNC: 20.5 NG/ML
ALBUMIN SERPL ELPH-MCNC: 4.3 G/DL
ALP BLD-CCNC: 45 U/L
ALT SERPL-CCNC: 9 U/L
ANION GAP SERPL CALC-SCNC: 11 MMOL/L
AST SERPL-CCNC: 13 U/L
BILIRUB SERPL-MCNC: 0.4 MG/DL
BUN SERPL-MCNC: 13 MG/DL
CALCIUM SERPL-MCNC: 8.8 MG/DL
CHLORIDE SERPL-SCNC: 106 MMOL/L
CHOLEST SERPL-MCNC: 157 MG/DL
CO2 SERPL-SCNC: 23 MMOL/L
CREAT SERPL-MCNC: 0.64 MG/DL
EGFR: 121 ML/MIN/1.73M2
ESTIMATED AVERAGE GLUCOSE: 108 MG/DL
ESTRADIOL SERPL-MCNC: 280 PG/ML
FSH SERPL-MCNC: 4.2 IU/L
GLUCOSE SERPL-MCNC: 87 MG/DL
HBA1C MFR BLD HPLC: 5.4 %
HDLC SERPL-MCNC: 59 MG/DL
LDLC SERPL CALC-MCNC: 84 MG/DL
LH SERPL-ACNC: 10.9 IU/L
NONHDLC SERPL-MCNC: 98 MG/DL
POTASSIUM SERPL-SCNC: 4.6 MMOL/L
PROLACTIN SERPL-MCNC: 13.3 NG/ML
PROT SERPL-MCNC: 6.9 G/DL
SODIUM SERPL-SCNC: 140 MMOL/L
T3FREE SERPL-MCNC: 2.67 PG/ML
T4 FREE SERPL-MCNC: 1.8 NG/DL
TESTOST FREE SERPL-MCNC: 1.6 PG/ML
TESTOST SERPL-MCNC: 30.2 NG/DL
TRIGL SERPL-MCNC: 71 MG/DL
TSH SERPL-ACNC: 0.95 UIU/ML
VIT B12 SERPL-MCNC: 534 PG/ML

## 2023-10-24 ENCOUNTER — NON-APPOINTMENT (OUTPATIENT)
Age: 33
End: 2023-10-24

## 2023-10-26 LAB
INSULIN FREE SERPL-MCNC: 6.7 UU/ML
INSULIN: 6.7 UU/ML

## 2023-10-27 ENCOUNTER — NON-APPOINTMENT (OUTPATIENT)
Age: 33
End: 2023-10-27

## 2023-10-27 LAB — DHEA-SULFATE, SERUM: 154 UG/DL

## 2023-10-30 ENCOUNTER — APPOINTMENT (OUTPATIENT)
Dept: INTERNAL MEDICINE | Facility: CLINIC | Age: 33
End: 2023-10-30
Payer: MEDICAID

## 2023-10-30 VITALS
OXYGEN SATURATION: 97 % | SYSTOLIC BLOOD PRESSURE: 124 MMHG | DIASTOLIC BLOOD PRESSURE: 80 MMHG | WEIGHT: 239 LBS | RESPIRATION RATE: 13 BRPM | HEART RATE: 88 BPM | BODY MASS INDEX: 36.22 KG/M2 | TEMPERATURE: 98.1 F | HEIGHT: 68 IN

## 2023-10-30 DIAGNOSIS — J06.9 ACUTE UPPER RESPIRATORY INFECTION, UNSPECIFIED: ICD-10-CM

## 2023-10-30 PROCEDURE — 99213 OFFICE O/P EST LOW 20 MIN: CPT

## 2023-11-08 LAB
RAPID RVP RESULT: DETECTED
RV+EV RNA SPEC QL NAA+PROBE: DETECTED
SARS-COV-2 RNA PNL RESP NAA+PROBE: NOT DETECTED

## 2023-11-14 ENCOUNTER — APPOINTMENT (OUTPATIENT)
Dept: INTERNAL MEDICINE | Facility: CLINIC | Age: 33
End: 2023-11-14
Payer: MEDICAID

## 2023-11-14 VITALS
RESPIRATION RATE: 13 BRPM | BODY MASS INDEX: 37.13 KG/M2 | SYSTOLIC BLOOD PRESSURE: 125 MMHG | OXYGEN SATURATION: 98 % | WEIGHT: 245 LBS | HEIGHT: 68 IN | HEART RATE: 80 BPM | DIASTOLIC BLOOD PRESSURE: 80 MMHG

## 2023-11-14 DIAGNOSIS — R68.89 OTHER GENERAL SYMPTOMS AND SIGNS: ICD-10-CM

## 2023-11-14 DIAGNOSIS — M54.2 CERVICALGIA: ICD-10-CM

## 2023-11-14 PROCEDURE — 99214 OFFICE O/P EST MOD 30 MIN: CPT

## 2023-11-14 RX ORDER — CEFUROXIME AXETIL 250 MG/1
250 TABLET ORAL
Qty: 20 | Refills: 0 | Status: DISCONTINUED | COMMUNITY
Start: 2023-10-30 | End: 2023-11-14

## 2023-11-14 RX ORDER — NAPROXEN 500 MG/1
500 TABLET, DELAYED RELEASE ORAL
Qty: 30 | Refills: 0 | Status: DISCONTINUED | COMMUNITY
Start: 2023-11-14 | End: 2023-11-14

## 2023-11-14 RX ORDER — METHYLPREDNISOLONE 4 MG/1
4 TABLET ORAL
Qty: 1 | Refills: 0 | Status: DISCONTINUED | COMMUNITY
Start: 2023-10-30 | End: 2023-11-14

## 2023-11-15 ENCOUNTER — APPOINTMENT (OUTPATIENT)
Dept: ENDOCRINOLOGY | Facility: CLINIC | Age: 33
End: 2023-11-15

## 2023-11-16 ENCOUNTER — OUTPATIENT (OUTPATIENT)
Dept: OUTPATIENT SERVICES | Facility: HOSPITAL | Age: 33
LOS: 1 days | End: 2023-11-16
Payer: MEDICAID

## 2023-11-16 ENCOUNTER — APPOINTMENT (OUTPATIENT)
Dept: CT IMAGING | Facility: CLINIC | Age: 33
End: 2023-11-16
Payer: MEDICAID

## 2023-11-16 DIAGNOSIS — R68.89 OTHER GENERAL SYMPTOMS AND SIGNS: ICD-10-CM

## 2023-11-16 DIAGNOSIS — Z00.8 ENCOUNTER FOR OTHER GENERAL EXAMINATION: ICD-10-CM

## 2023-11-16 PROCEDURE — 70450 CT HEAD/BRAIN W/O DYE: CPT

## 2023-11-16 PROCEDURE — 70450 CT HEAD/BRAIN W/O DYE: CPT | Mod: 26

## 2023-11-16 RX ORDER — METAXALONE 800 MG/1
800 TABLET ORAL 3 TIMES DAILY
Qty: 30 | Refills: 0 | Status: DISCONTINUED | COMMUNITY
Start: 2023-11-14 | End: 2023-11-16

## 2023-11-17 ENCOUNTER — TRANSCRIPTION ENCOUNTER (OUTPATIENT)
Age: 33
End: 2023-11-17

## 2023-11-18 ENCOUNTER — NON-APPOINTMENT (OUTPATIENT)
Age: 33
End: 2023-11-18

## 2023-12-07 ENCOUNTER — APPOINTMENT (OUTPATIENT)
Dept: OTOLARYNGOLOGY | Facility: CLINIC | Age: 33
End: 2023-12-07
Payer: MEDICAID

## 2023-12-07 VITALS
SYSTOLIC BLOOD PRESSURE: 122 MMHG | HEART RATE: 77 BPM | BODY MASS INDEX: 37.13 KG/M2 | DIASTOLIC BLOOD PRESSURE: 83 MMHG | HEIGHT: 68 IN | WEIGHT: 245 LBS

## 2023-12-07 DIAGNOSIS — R49.0 DYSPHONIA: ICD-10-CM

## 2023-12-07 DIAGNOSIS — J32.4 CHRONIC PANSINUSITIS: ICD-10-CM

## 2023-12-07 DIAGNOSIS — K21.9 GASTRO-ESOPHAGEAL REFLUX DISEASE W/OUT ESOPHAGITIS: ICD-10-CM

## 2023-12-07 PROCEDURE — 99204 OFFICE O/P NEW MOD 45 MIN: CPT | Mod: 25

## 2023-12-07 PROCEDURE — 31575 DIAGNOSTIC LARYNGOSCOPY: CPT

## 2023-12-18 ENCOUNTER — APPOINTMENT (OUTPATIENT)
Dept: OTOLARYNGOLOGY | Facility: CLINIC | Age: 33
End: 2023-12-18

## 2023-12-18 ENCOUNTER — EMERGENCY (EMERGENCY)
Facility: HOSPITAL | Age: 33
LOS: 1 days | Discharge: HOME | End: 2023-12-18
Attending: EMERGENCY MEDICINE
Payer: COMMERCIAL

## 2023-12-18 VITALS
SYSTOLIC BLOOD PRESSURE: 137 MMHG | OXYGEN SATURATION: 97 % | WEIGHT: 251.77 LBS | TEMPERATURE: 98 F | HEART RATE: 111 BPM | RESPIRATION RATE: 18 BRPM | DIASTOLIC BLOOD PRESSURE: 84 MMHG | HEIGHT: 68 IN

## 2023-12-18 VITALS — HEART RATE: 88 BPM

## 2023-12-18 LAB
ALBUMIN SERPL ELPH-MCNC: 4.1 G/DL — SIGNIFICANT CHANGE UP (ref 3.3–5.2)
ALBUMIN SERPL ELPH-MCNC: 4.1 G/DL — SIGNIFICANT CHANGE UP (ref 3.3–5.2)
ALP SERPL-CCNC: 49 U/L — SIGNIFICANT CHANGE UP (ref 40–120)
ALP SERPL-CCNC: 49 U/L — SIGNIFICANT CHANGE UP (ref 40–120)
ALT FLD-CCNC: 12 U/L — SIGNIFICANT CHANGE UP
ALT FLD-CCNC: 12 U/L — SIGNIFICANT CHANGE UP
ANION GAP SERPL CALC-SCNC: 11 MMOL/L — SIGNIFICANT CHANGE UP (ref 5–17)
ANION GAP SERPL CALC-SCNC: 11 MMOL/L — SIGNIFICANT CHANGE UP (ref 5–17)
AST SERPL-CCNC: 17 U/L — SIGNIFICANT CHANGE UP
AST SERPL-CCNC: 17 U/L — SIGNIFICANT CHANGE UP
BASOPHILS # BLD AUTO: 0.05 K/UL — SIGNIFICANT CHANGE UP (ref 0–0.2)
BASOPHILS # BLD AUTO: 0.05 K/UL — SIGNIFICANT CHANGE UP (ref 0–0.2)
BASOPHILS NFR BLD AUTO: 0.5 % — SIGNIFICANT CHANGE UP (ref 0–2)
BASOPHILS NFR BLD AUTO: 0.5 % — SIGNIFICANT CHANGE UP (ref 0–2)
BILIRUB SERPL-MCNC: 0.3 MG/DL — LOW (ref 0.4–2)
BILIRUB SERPL-MCNC: 0.3 MG/DL — LOW (ref 0.4–2)
BLD GP AB SCN SERPL QL: SIGNIFICANT CHANGE UP
BLD GP AB SCN SERPL QL: SIGNIFICANT CHANGE UP
BUN SERPL-MCNC: 15 MG/DL — SIGNIFICANT CHANGE UP (ref 8–20)
BUN SERPL-MCNC: 15 MG/DL — SIGNIFICANT CHANGE UP (ref 8–20)
CALCIUM SERPL-MCNC: 8.2 MG/DL — LOW (ref 8.4–10.5)
CALCIUM SERPL-MCNC: 8.2 MG/DL — LOW (ref 8.4–10.5)
CHLORIDE SERPL-SCNC: 107 MMOL/L — SIGNIFICANT CHANGE UP (ref 96–108)
CHLORIDE SERPL-SCNC: 107 MMOL/L — SIGNIFICANT CHANGE UP (ref 96–108)
CO2 SERPL-SCNC: 22 MMOL/L — SIGNIFICANT CHANGE UP (ref 22–29)
CO2 SERPL-SCNC: 22 MMOL/L — SIGNIFICANT CHANGE UP (ref 22–29)
CREAT SERPL-MCNC: 0.62 MG/DL — SIGNIFICANT CHANGE UP (ref 0.5–1.3)
CREAT SERPL-MCNC: 0.62 MG/DL — SIGNIFICANT CHANGE UP (ref 0.5–1.3)
EGFR: 122 ML/MIN/1.73M2 — SIGNIFICANT CHANGE UP
EGFR: 122 ML/MIN/1.73M2 — SIGNIFICANT CHANGE UP
EOSINOPHIL # BLD AUTO: 0.14 K/UL — SIGNIFICANT CHANGE UP (ref 0–0.5)
EOSINOPHIL # BLD AUTO: 0.14 K/UL — SIGNIFICANT CHANGE UP (ref 0–0.5)
EOSINOPHIL NFR BLD AUTO: 1.5 % — SIGNIFICANT CHANGE UP (ref 0–6)
EOSINOPHIL NFR BLD AUTO: 1.5 % — SIGNIFICANT CHANGE UP (ref 0–6)
GLUCOSE SERPL-MCNC: 90 MG/DL — SIGNIFICANT CHANGE UP (ref 70–99)
GLUCOSE SERPL-MCNC: 90 MG/DL — SIGNIFICANT CHANGE UP (ref 70–99)
HCG SERPL-ACNC: <4 MIU/ML — SIGNIFICANT CHANGE UP
HCG SERPL-ACNC: <4 MIU/ML — SIGNIFICANT CHANGE UP
HCT VFR BLD CALC: 38.6 % — SIGNIFICANT CHANGE UP (ref 34.5–45)
HCT VFR BLD CALC: 38.6 % — SIGNIFICANT CHANGE UP (ref 34.5–45)
HGB BLD-MCNC: 13.6 G/DL — SIGNIFICANT CHANGE UP (ref 11.5–15.5)
HGB BLD-MCNC: 13.6 G/DL — SIGNIFICANT CHANGE UP (ref 11.5–15.5)
IMM GRANULOCYTES NFR BLD AUTO: 0.3 % — SIGNIFICANT CHANGE UP (ref 0–0.9)
IMM GRANULOCYTES NFR BLD AUTO: 0.3 % — SIGNIFICANT CHANGE UP (ref 0–0.9)
LYMPHOCYTES # BLD AUTO: 1.93 K/UL — SIGNIFICANT CHANGE UP (ref 1–3.3)
LYMPHOCYTES # BLD AUTO: 1.93 K/UL — SIGNIFICANT CHANGE UP (ref 1–3.3)
LYMPHOCYTES # BLD AUTO: 20.1 % — SIGNIFICANT CHANGE UP (ref 13–44)
LYMPHOCYTES # BLD AUTO: 20.1 % — SIGNIFICANT CHANGE UP (ref 13–44)
MCHC RBC-ENTMCNC: 31.5 PG — SIGNIFICANT CHANGE UP (ref 27–34)
MCHC RBC-ENTMCNC: 31.5 PG — SIGNIFICANT CHANGE UP (ref 27–34)
MCHC RBC-ENTMCNC: 35.2 GM/DL — SIGNIFICANT CHANGE UP (ref 32–36)
MCHC RBC-ENTMCNC: 35.2 GM/DL — SIGNIFICANT CHANGE UP (ref 32–36)
MCV RBC AUTO: 89.4 FL — SIGNIFICANT CHANGE UP (ref 80–100)
MCV RBC AUTO: 89.4 FL — SIGNIFICANT CHANGE UP (ref 80–100)
MONOCYTES # BLD AUTO: 0.48 K/UL — SIGNIFICANT CHANGE UP (ref 0–0.9)
MONOCYTES # BLD AUTO: 0.48 K/UL — SIGNIFICANT CHANGE UP (ref 0–0.9)
MONOCYTES NFR BLD AUTO: 5 % — SIGNIFICANT CHANGE UP (ref 2–14)
MONOCYTES NFR BLD AUTO: 5 % — SIGNIFICANT CHANGE UP (ref 2–14)
NEUTROPHILS # BLD AUTO: 6.99 K/UL — SIGNIFICANT CHANGE UP (ref 1.8–7.4)
NEUTROPHILS # BLD AUTO: 6.99 K/UL — SIGNIFICANT CHANGE UP (ref 1.8–7.4)
NEUTROPHILS NFR BLD AUTO: 72.6 % — SIGNIFICANT CHANGE UP (ref 43–77)
NEUTROPHILS NFR BLD AUTO: 72.6 % — SIGNIFICANT CHANGE UP (ref 43–77)
PLATELET # BLD AUTO: 229 K/UL — SIGNIFICANT CHANGE UP (ref 150–400)
PLATELET # BLD AUTO: 229 K/UL — SIGNIFICANT CHANGE UP (ref 150–400)
POTASSIUM SERPL-MCNC: 4 MMOL/L — SIGNIFICANT CHANGE UP (ref 3.5–5.3)
POTASSIUM SERPL-MCNC: 4 MMOL/L — SIGNIFICANT CHANGE UP (ref 3.5–5.3)
POTASSIUM SERPL-SCNC: 4 MMOL/L — SIGNIFICANT CHANGE UP (ref 3.5–5.3)
POTASSIUM SERPL-SCNC: 4 MMOL/L — SIGNIFICANT CHANGE UP (ref 3.5–5.3)
PROT SERPL-MCNC: 7 G/DL — SIGNIFICANT CHANGE UP (ref 6.6–8.7)
PROT SERPL-MCNC: 7 G/DL — SIGNIFICANT CHANGE UP (ref 6.6–8.7)
RBC # BLD: 4.32 M/UL — SIGNIFICANT CHANGE UP (ref 3.8–5.2)
RBC # BLD: 4.32 M/UL — SIGNIFICANT CHANGE UP (ref 3.8–5.2)
RBC # FLD: 12.8 % — SIGNIFICANT CHANGE UP (ref 10.3–14.5)
RBC # FLD: 12.8 % — SIGNIFICANT CHANGE UP (ref 10.3–14.5)
SODIUM SERPL-SCNC: 140 MMOL/L — SIGNIFICANT CHANGE UP (ref 135–145)
SODIUM SERPL-SCNC: 140 MMOL/L — SIGNIFICANT CHANGE UP (ref 135–145)
WBC # BLD: 9.62 K/UL — SIGNIFICANT CHANGE UP (ref 3.8–10.5)
WBC # BLD: 9.62 K/UL — SIGNIFICANT CHANGE UP (ref 3.8–10.5)
WBC # FLD AUTO: 9.62 K/UL — SIGNIFICANT CHANGE UP (ref 3.8–10.5)
WBC # FLD AUTO: 9.62 K/UL — SIGNIFICANT CHANGE UP (ref 3.8–10.5)

## 2023-12-18 PROCEDURE — 99284 EMERGENCY DEPT VISIT MOD MDM: CPT

## 2023-12-18 PROCEDURE — 36415 COLL VENOUS BLD VENIPUNCTURE: CPT

## 2023-12-18 PROCEDURE — 96374 THER/PROPH/DIAG INJ IV PUSH: CPT

## 2023-12-18 PROCEDURE — 86850 RBC ANTIBODY SCREEN: CPT

## 2023-12-18 PROCEDURE — 99284 EMERGENCY DEPT VISIT MOD MDM: CPT | Mod: 25

## 2023-12-18 PROCEDURE — 86900 BLOOD TYPING SEROLOGIC ABO: CPT

## 2023-12-18 PROCEDURE — 80053 COMPREHEN METABOLIC PANEL: CPT

## 2023-12-18 PROCEDURE — 84702 CHORIONIC GONADOTROPIN TEST: CPT

## 2023-12-18 PROCEDURE — 86901 BLOOD TYPING SEROLOGIC RH(D): CPT

## 2023-12-18 PROCEDURE — 85025 COMPLETE CBC W/AUTO DIFF WBC: CPT

## 2023-12-18 RX ORDER — ACETAMINOPHEN 500 MG
1000 TABLET ORAL ONCE
Refills: 0 | Status: COMPLETED | OUTPATIENT
Start: 2023-12-18 | End: 2023-12-18

## 2023-12-18 RX ADMIN — Medication 400 MILLIGRAM(S): at 12:27

## 2023-12-18 NOTE — ED PROVIDER NOTE - PATIENT PORTAL LINK FT
You can access the FollowMyHealth Patient Portal offered by [facility] by registering at the following website: [email or web address]. By joining [facility]’s FollowMyHealth portal, you will also be able to view your health information using other applications (apps) compatible with our system. You can access the FollowMyHealth Patient Portal offered by [facility] by registering at the following website: [email or web address]. By joining [facility]’s FollowMyHealth portal, you will also be able to view your health information using other applications (apps) compatible with our system.

## 2023-12-18 NOTE — ED PROVIDER NOTE - NSFOLLOWUPINSTRUCTIONS_ED_ALL_ED_FT
Follow up closely with gastroenterologist. Return for any significant bleeding, dizziness, abdominal pain, chest pain/shortness of breath or any other concerning symptoms.   Please follow up with your Primary Care Doctor in 1 - 2 days. If you cannot follow-up with your primary care doctor please return to the Emergency Department for any urgent issues.  - Seek immediate medical care for any new, worsening or concerning signs or symptoms.     - If you have difficulty following up, please call: [phone]) or go to [email or web address] to obtain a [facility] doctor or specialist who takes your insurance in your area.    Continue all previously prescribed medications as directed. You can use motrin 600mg every 6-8 hours for pain or fever, and/or Tylenol 650 mg every 4 hours for pain/fever. Follow up with your primary care physician in 48-72 hours- bring copies of your results.  Return to the emergency department for chest pain, shortness of breath, dizziness, or worsening, concerning, or persistent symptoms. Follow up closely with gastroenterologist. Return for any significant bleeding, dizziness, abdominal pain, chest pain/shortness of breath or any other concerning symptoms.   Please follow up with your Primary Care Doctor in 1 - 2 days. If you cannot follow-up with your primary care doctor please return to the Emergency Department for any urgent issues.  - Seek immediate medical care for any new, worsening or concerning signs or symptoms.     - If you have difficulty following up, please call: [phone] or go to [email or web address] to obtain a [facility] doctor or specialist who takes your insurance in your area.    Continue all previously prescribed medications as directed. You can use motrin 600mg every 6-8 hours for pain or fever, and/or Tylenol 650 mg every 4 hours for pain/fever. Follow up with your primary care physician in 48-72 hours- bring copies of your results.  Return to the emergency department for chest pain, shortness of breath, dizziness, or worsening, concerning, or persistent symptoms.

## 2023-12-18 NOTE — ED PROVIDER NOTE - OBJECTIVE STATEMENT
32 y/o female hx hypothyroid present for multiple episodes of brbpr starting last night. states similar episode in february where felt sweaty/dizzy, had 2 days of brbpr, resolved. was out of town, by time came back for check up gi told her was probably "constipation" per pt. no symtpoms in betwee. states minimal lower abd/back discomfort. no urinary symptoms. no dizziness, no cp/sob. no other complaints currently. denies recent constipation. no colonoscopy.

## 2023-12-18 NOTE — ED PROVIDER NOTE - CLINICAL SUMMARY MEDICAL DECISION MAKING FREE TEXT BOX
32 y/o female hx hypothyroid present for multiple episodes of brbpr starting last night. similar self limited episode in february. was unabl eto see gi today so came in. no other symptoms. benign abd. no a/c. ?internal hemrrhoid vs diverticular bleed, suspect likely self limited. will check labs and can arrange close f/u with pt care coordinator for gi f/u if labs wnl. 34 y/o female hx hypothyroid present for multiple episodes of brbpr starting last night. similar self limited episode in february. was unabl eto see gi today so came in. no other symptoms. benign abd. no a/c. ?internal hemrrhoid vs diverticular bleed, suspect likely self limited. will check labs and can arrange close f/u with pt care coordinator for gi f/u if labs wnl.    HD stable, hgb 13.6 with prior being 13.4. no episodes bleeding here. no pain and benign abd. pt care coordinator contacted for close gi f/u. pt would like to go home. retur nprecautoins. 32 y/o female hx hypothyroid present for multiple episodes of brbpr starting last night. similar self limited episode in february. was unabl eto see gi today so came in. no other symptoms. benign abd. no a/c. ?internal hemrrhoid vs diverticular bleed, suspect likely self limited. will check labs and can arrange close f/u with pt care coordinator for gi f/u if labs wnl.    HD stable, hgb 13.6 with prior being 13.4. no episodes bleeding here. no pain and benign abd. pt care coordinator contacted for close gi f/u. pt would like to go home. retur nprecautoins.

## 2023-12-18 NOTE — ED PROVIDER NOTE - PHYSICAL EXAMINATION
Gen: No acute distress, non toxic  HEENT: Mucous membranes moist, pink conjunctivae, EOMI  CV: opulse ~100, nl s1/s2.  Resp: CTAB, normal rate and effort  GI: Abdomen soft, NT, ND. No rebound, no guarding  : No CVAT  Neuro: A&O x 3, moving all 4 extremities  MSK: No spine or joint tenderness to palpation  Skin: No rashes. intact and perfused. Gen: No acute distress, non toxic  HEENT: Mucous membranes moist, pink conjunctivae, EOMI  CV: opulse ~100, nl s1/s2.  Resp: CTAB, normal rate and effort  GI: Abdomen soft, NT, ND. No rebound, no guarding. rectal chaperoned by STACY Graham, no hemorrhoids or fissures.   : No CVAT  Neuro: A&O x 3, moving all 4 extremities  MSK: No spine or joint tenderness to palpation  Skin: No rashes. intact and perfused.

## 2023-12-18 NOTE — ED ADULT TRIAGE NOTE - CHIEF COMPLAINT QUOTE
pt c/o blood in stool, started at 330 am, x 5 episodes  A&Ox3, resp wnl, c/o low back & low abdominal cramping

## 2023-12-20 ENCOUNTER — APPOINTMENT (OUTPATIENT)
Dept: GASTROENTEROLOGY | Facility: CLINIC | Age: 33
End: 2023-12-20
Payer: MEDICAID

## 2023-12-20 VITALS
SYSTOLIC BLOOD PRESSURE: 169 MMHG | HEART RATE: 79 BPM | WEIGHT: 293 LBS | HEIGHT: 68 IN | BODY MASS INDEX: 44.41 KG/M2 | DIASTOLIC BLOOD PRESSURE: 100 MMHG

## 2023-12-20 DIAGNOSIS — K62.5 HEMORRHAGE OF ANUS AND RECTUM: ICD-10-CM

## 2023-12-20 LAB
DATE COLLECTED: NORMAL
HEMOCCULT SP1 STL QL: NEGATIVE
QUALITY CONTROL: YES

## 2023-12-20 PROCEDURE — 99214 OFFICE O/P EST MOD 30 MIN: CPT

## 2023-12-20 NOTE — PHYSICAL EXAM
[Alert] : alert [Normal Voice/Communication] : normal voice/communication [Healthy Appearing] : healthy appearing [No Acute Distress] : no acute distress [Sclera] : the sclera and conjunctiva were normal [Hearing Threshold Finger Rub Not [place]] : hearing was normal [Normal Lips/Gums] : the lips and gums were normal [Normal Appearance] : the appearance of the neck was normal [No Neck Mass] : no neck mass was observed [No Respiratory Distress] : no respiratory distress [No Acc Muscle Use] : no accessory muscle use [Respiration, Rhythm And Depth] : normal respiratory rhythm and effort [Auscultation Breath Sounds / Voice Sounds] : lungs were clear to auscultation bilaterally [Heart Rate And Rhythm] : heart rate was normal and rhythm regular [Normal S1, S2] : normal S1 and S2 [Bowel Sounds] : normal bowel sounds [Abdomen Tenderness] : non-tender [No Masses] : no abdominal mass palpated [Abdomen Soft] : soft [] : no hepatosplenomegaly [Normal rectal exam] : was normal [Internal Hemorrhoid] : no internal hemorrhoids were present [External Hemorrhoid] : no external hemorrhoids were present [Normal] : was normal [None] : there was no rectal mass  [Occult Blood Positive] : was negative for occult blood [Stool Sample Taken] : a stool sample was obtained [Oriented To Time, Place, And Person] : oriented to person, place, and time

## 2023-12-20 NOTE — HISTORY OF PRESENT ILLNESS
[FreeTextEntry1] : Patient is a 33 year old female, with PMH of hypothyroidism s/p total thyroidectomy in 2019 for Graves disease, who presents for follow up after recent ER visit.   PT was seen by Dr. Ramos in February 2023 for similar complaints. She states she usually has 1-2 BMs/day, denies constipation. Two days ago, felt the urge to have a BM, went to the bathroom and felt near syncope/vasovagal with acute rectal bleeding. She was passing bloody stools for about 1 hour, 6 episodes total. She went to [facility], no imaging done. Hgb remained stable around 13.6g, no intervention needed. Symptoms were self limiting and resolved on its own. She was having some lower abdominal cramping and back pain which has also resolved. She denies straining.   Pt has never had a colonoscopy in the past. Denies a family h/o colon cancer, polyps or IBD.   Patient denies any significant cardiac or pulmonary conditions.   Patient denies pyrosis, dysphagia, nausea, vomiting, or unexplained weight loss.

## 2023-12-20 NOTE — ASSESSMENT
[FreeTextEntry1] : Patient is a 33 year old female, with PMH of hypothyroidism s/p total thyroidectomy in 2019 for Graves disease, who presents for follow up after recent ER visit. Was at [facility] on 12/18/23 for rectal bleeding x 1 day, self limiting. DIANA today normal, negative hemoccult with stable Hgb in the ER. This is the second episode this year.   Will start with CT A/P to be done now and Colonoscopy to be scheduled. I have discussed the indications, risks and benefits of procedure with patient. Risks include, but not limited to, bleeding, perforation, infection, and reaction to anesthesia. Alternatives to colonoscopy discussed with patient. Patient was given the opportunity to ask questions, all questions were answered. The patient agrees to proceed with colonoscopy. Patient is medically optimized for colonoscopy.   Gavilyte prep to be used. Bowel prep instructions discussed at length.

## 2023-12-21 ENCOUNTER — APPOINTMENT (OUTPATIENT)
Dept: CT IMAGING | Facility: CLINIC | Age: 33
End: 2023-12-21
Payer: MEDICAID

## 2023-12-21 ENCOUNTER — OUTPATIENT (OUTPATIENT)
Dept: OUTPATIENT SERVICES | Facility: HOSPITAL | Age: 33
LOS: 1 days | End: 2023-12-21
Payer: MEDICAID

## 2023-12-21 ENCOUNTER — NON-APPOINTMENT (OUTPATIENT)
Age: 33
End: 2023-12-21

## 2023-12-21 DIAGNOSIS — K62.5 HEMORRHAGE OF ANUS AND RECTUM: ICD-10-CM

## 2023-12-21 PROCEDURE — 74177 CT ABD & PELVIS W/CONTRAST: CPT | Mod: 26

## 2023-12-21 PROCEDURE — 74177 CT ABD & PELVIS W/CONTRAST: CPT

## 2024-01-04 NOTE — CONSULT LETTER
[Dear  ___] : Dear  [unfilled], [Please see my note below.] : Please see my note below. [Consult Letter:] : I had the pleasure of evaluating your patient, [unfilled]. [Consult Closing:] : Thank you very much for allowing me to participate in the care of this patient.  If you have any questions, please do not hesitate to contact me. [Sincerely,] : Sincerely, [FreeTextEntry2] : Dr. Ruddy Casillas [FreeTextEntry3] : Waldemar Erickson M.D. Division of Laryngology | Department of Otolaryngology  [address] [address]

## 2024-01-15 ENCOUNTER — TRANSCRIPTION ENCOUNTER (OUTPATIENT)
Age: 34
End: 2024-01-15

## 2024-01-15 NOTE — PHYSICAL EXAM

## 2024-01-16 ENCOUNTER — APPOINTMENT (OUTPATIENT)
Dept: GASTROENTEROLOGY | Facility: GI CENTER | Age: 34
End: 2024-01-16
Payer: MEDICAID

## 2024-01-16 ENCOUNTER — OUTPATIENT (OUTPATIENT)
Dept: OUTPATIENT SERVICES | Facility: HOSPITAL | Age: 34
LOS: 1 days | End: 2024-01-16
Payer: COMMERCIAL

## 2024-01-16 ENCOUNTER — RESULT REVIEW (OUTPATIENT)
Age: 34
End: 2024-01-16

## 2024-01-16 DIAGNOSIS — K62.5 HEMORRHAGE OF ANUS AND RECTUM: ICD-10-CM

## 2024-01-16 PROCEDURE — 45385 COLONOSCOPY W/LESION REMOVAL: CPT

## 2024-01-16 PROCEDURE — 88305 TISSUE EXAM BY PATHOLOGIST: CPT

## 2024-01-16 PROCEDURE — 88305 TISSUE EXAM BY PATHOLOGIST: CPT | Mod: 26

## 2024-01-16 NOTE — HISTORY OF PRESENT ILLNESS
[FreeTextEntry1] : The patient has arrived for colonoscopy.  He recently had a travel to [place] with her son for a basketball game.  She got constipated and then had to strain and also had lower abdominal discomfort.  She had to manually disimpact her and led to rectal bleeding.  She has some loose stools after that.  Rectal bleeding has abated.  Evaluated by primary physician and underwent rectal exam and the stool occult was negative.  Underwent CT abdomen and pelvis which was unimpressive.  No prior history of any issues.  No history of rectal bleeding in the past.  No weight loss.  No family history of colon polyp or colon cancer.  No previous colonoscopy.

## 2024-01-18 NOTE — HISTORY OF PRESENT ILLNESS
[de-identified] : REBECCA AVITIA is a 33 year old woman who presents to the [facility] with difficulty phonating. She is referred by Dr. Ruddy Casillas who last saw the patient on 12/7/23. She now ~  ~ note periods of normal voicing. She ~  ~ note specific difficulties with swallowing. She ~  ~ note frequent classic heartburn symptoms.   VOCAL DEMANDS: Her vocal demands are primarily those of ~  ~. show

## 2024-01-19 LAB — SURGICAL PATHOLOGY STUDY: SIGNIFICANT CHANGE UP

## 2024-01-24 ENCOUNTER — APPOINTMENT (OUTPATIENT)
Dept: ENDOCRINOLOGY | Facility: CLINIC | Age: 34
End: 2024-01-24
Payer: MEDICAID

## 2024-01-24 VITALS — HEART RATE: 81 BPM | DIASTOLIC BLOOD PRESSURE: 78 MMHG | OXYGEN SATURATION: 98 % | SYSTOLIC BLOOD PRESSURE: 132 MMHG

## 2024-01-24 VITALS — WEIGHT: 256 LBS | HEIGHT: 68 IN | BODY MASS INDEX: 38.8 KG/M2

## 2024-01-24 DIAGNOSIS — L70.9 ACNE, UNSPECIFIED: ICD-10-CM

## 2024-01-24 PROCEDURE — 99214 OFFICE O/P EST MOD 30 MIN: CPT

## 2024-01-24 RX ORDER — NAPROXEN 500 MG/1
500 TABLET ORAL
Qty: 30 | Refills: 0 | Status: DISCONTINUED | COMMUNITY
Start: 2023-11-14 | End: 2024-01-24

## 2024-01-24 RX ORDER — LEVOFLOXACIN 500 MG/1
500 TABLET, FILM COATED ORAL
Qty: 14 | Refills: 0 | Status: DISCONTINUED | COMMUNITY
Start: 2023-12-07 | End: 2024-01-24

## 2024-01-24 NOTE — ASSESSMENT
[FreeTextEntry1] : 33 year old female with hypothyroidism s/p total thyroidectomy for Grave's diseaes in May 2019. She had post surgical hypoparathyroidism as well. Started on Metformin one year ago for irregular menstrual cycles, acne, and weight gain.   1. Post surgical hypothyroidism -Continue current dose of LT4. -Repeat TFTs now.  2. Post surgical hypoparathyroidism -Repeat calcium with PTH and vitamin D now. -Continue calcitriol and vitamin D supplement.  3. ? PCOS- GYN says patient does not have PCOS, but has h/o irregular menstrual cycle and signs of androgen excess (acne), as well as weight gain. -For hirsutism/acne:  minimal symptoms. Can try spironolactone for hirsutism in the future if needed. Reports acne has improved with metformin use. -For menstrual regularity: continue metformin. No plans for pregnancy at this time. -For weight/metabolic issues: Continue Metformin. Discussed weight loss medications. Unfortunately, Medicaid does not cover GLP1 agonists for obesity. -Advised low fat, low carb diet. -Increase exercise. Suggest moderate intensity cardio 3-4x per week for 30 minutes, can do strength training as well. -Can check testosterone level with next labs.

## 2024-01-24 NOTE — HISTORY OF PRESENT ILLNESS
[FreeTextEntry1] : INTERVAL HISTORY: constipation, BRBPR. Had colonoscopy- two polyps and a hemorrhoid.  Post surgical hypothyroidism s/p total thyroidectomy May 2019 for Grave's disease  Current regimen: LT4 150 mcg daily, takes appropriately.  Post surgical hypoparathyroidism with symptomatic hypocalcemia Current regimen: Calcitriol 0.5 mcg BID, Vit D 50,000 IU once weekly  Denies anterior neck pain and dysphagia. +raspy voice since surgery. Has appointment with ENT. =============================== ? PCOS- GYN says she does not have PCOS but was started on Metformin about 1 year ago by Dr Puri due to acne, irregular menstrual cycles, and weight gain/difficulty losing weight.  Acne: improved, flares up with menses Hirsutism: on chin and neck, stable Some hair thinning Menses: now regular since on Metformin LMP: 1/11/24 Weight: gained 10 pounds in the past 6 months Diet: B- black coffee with egg platter (egg whites and veggies), skips lunch, D- salads or grilled chicken and rice. Snacks on cookies baked by sister. Doesn't like sweets much or chips. Does not drink soda. Exercise: none, works in a [employer] so on her feet all day Denies history of GDM (son born in 2015, weighed 7 pounds 14 ounces at birth at 39 weeks) FH: T2DM in father. Denies history of pancreatitis and family history of thyroid cancer. Tobacco: down to 1/2 pack per day, has been cutting back with goal to quit.

## 2024-01-24 NOTE — PHYSICAL EXAM
[Alert] : alert [Well Nourished] : well nourished [No Acute Distress] : no acute distress [Well Developed] : well developed [Normal Sclera/Conjunctiva] : normal sclera/conjunctiva [EOMI] : extra ocular movement intact [No Proptosis] : no proptosis [Well Healed Scar] : well healed scar [No Respiratory Distress] : no respiratory distress [No Accessory Muscle Use] : no accessory muscle use [Clear to Auscultation] : lungs were clear to auscultation bilaterally [Normal S1, S2] : normal S1 and S2 [Normal Rate] : heart rate was normal [Regular Rhythm] : with a regular rhythm [No Edema] : no peripheral edema [Normal Anterior Cervical Nodes] : no anterior cervical lymphadenopathy [Normal Posterior Cervical Nodes] : no posterior cervical lymphadenopathy [Acanthosis Nigricans] : no acanthosis nigricans [Acne] : acne present [Oriented x3] : oriented to person, place, and time [Normal Affect] : the affect was normal [Normal Mood] : the mood was normal

## 2024-01-31 ENCOUNTER — NON-APPOINTMENT (OUTPATIENT)
Age: 34
End: 2024-01-31

## 2024-02-16 LAB
25(OH)D3 SERPL-MCNC: 28.3 NG/ML
ALBUMIN SERPL ELPH-MCNC: 4.3 G/DL
ALP BLD-CCNC: 49 U/L
ALT SERPL-CCNC: 14 U/L
ANION GAP SERPL CALC-SCNC: 12 MMOL/L
AST SERPL-CCNC: 17 U/L
BILIRUB SERPL-MCNC: 0.3 MG/DL
BUN SERPL-MCNC: 11 MG/DL
CALCIUM SERPL-MCNC: 8.5 MG/DL
CALCIUM SERPL-MCNC: 8.6 MG/DL
CHLORIDE SERPL-SCNC: 104 MMOL/L
CHOLEST SERPL-MCNC: 150 MG/DL
CO2 SERPL-SCNC: 24 MMOL/L
CREAT SERPL-MCNC: 0.65 MG/DL
CREAT SPEC-SCNC: 83 MG/DL
EGFR: 121 ML/MIN/1.73M2
ESTIMATED AVERAGE GLUCOSE: 100 MG/DL
GLUCOSE SERPL-MCNC: 72 MG/DL
HBA1C MFR BLD HPLC: 5.1 %
HCT VFR BLD CALC: 38.9 %
HDLC SERPL-MCNC: 58 MG/DL
HGB BLD-MCNC: 13.4 G/DL
LDLC SERPL CALC-MCNC: 81 MG/DL
MCHC RBC-ENTMCNC: 31.8 PG
MCHC RBC-ENTMCNC: 34.4 GM/DL
MCV RBC AUTO: 92.4 FL
MICROALBUMIN 24H UR DL<=1MG/L-MCNC: <1.2 MG/DL
MICROALBUMIN/CREAT 24H UR-RTO: NORMAL MG/G
NONHDLC SERPL-MCNC: 93 MG/DL
PARATHYROID HORMONE INTACT: 38 PG/ML
PLATELET # BLD AUTO: 279 K/UL
POTASSIUM SERPL-SCNC: 4.3 MMOL/L
PROT SERPL-MCNC: 6.9 G/DL
RBC # BLD: 4.21 M/UL
RBC # FLD: 13.2 %
SODIUM SERPL-SCNC: 139 MMOL/L
T4 FREE SERPL-MCNC: 1.5 NG/DL
TESTOST SERPL-MCNC: 37 NG/DL
TRIGL SERPL-MCNC: 57 MG/DL
TSH SERPL-ACNC: 1.38 UIU/ML
VIT B12 SERPL-MCNC: 787 PG/ML
WBC # FLD AUTO: 8.3 K/UL

## 2024-03-15 ENCOUNTER — NON-APPOINTMENT (OUTPATIENT)
Age: 34
End: 2024-03-15

## 2024-03-17 ENCOUNTER — NON-APPOINTMENT (OUTPATIENT)
Age: 34
End: 2024-03-17

## 2024-03-31 ENCOUNTER — NON-APPOINTMENT (OUTPATIENT)
Age: 34
End: 2024-03-31

## 2024-04-03 ENCOUNTER — APPOINTMENT (OUTPATIENT)
Dept: INTERNAL MEDICINE | Facility: CLINIC | Age: 34
End: 2024-04-03
Payer: MEDICAID

## 2024-04-03 VITALS
RESPIRATION RATE: 13 BRPM | OXYGEN SATURATION: 98 % | HEART RATE: 84 BPM | SYSTOLIC BLOOD PRESSURE: 130 MMHG | DIASTOLIC BLOOD PRESSURE: 80 MMHG

## 2024-04-03 DIAGNOSIS — Z11.59 ENCOUNTER FOR SCREENING FOR OTHER VIRAL DISEASES: ICD-10-CM

## 2024-04-03 DIAGNOSIS — H10.9 UNSPECIFIED CONJUNCTIVITIS: ICD-10-CM

## 2024-04-03 DIAGNOSIS — J34.89 OTHER SPECIFIED DISORDERS OF NOSE AND NASAL SINUSES: ICD-10-CM

## 2024-04-03 PROCEDURE — 99213 OFFICE O/P EST LOW 20 MIN: CPT

## 2024-04-03 PROCEDURE — G2211 COMPLEX E/M VISIT ADD ON: CPT | Mod: NC,1L

## 2024-04-03 RX ORDER — POLYETHYLENE GLYCOL-3350 AND ELECTROLYTES WITH FLAVOR PACK 240; 5.84; 2.98; 6.72; 22.72 G/278.26G; G/278.26G; G/278.26G; G/278.26G; G/278.26G
240 POWDER, FOR SOLUTION ORAL
Qty: 1 | Refills: 0 | Status: DISCONTINUED | COMMUNITY
Start: 2023-12-20 | End: 2024-04-03

## 2024-04-03 NOTE — ASSESSMENT
[FreeTextEntry1] : Patient prescribed Augmentin x 10 days/TobraDex ophthalmic suspension, viral swab sent..Patient advised to rest/increase fluids and use supportive therapy. Patient will call if symptoms persist or worsen and return to the office as scheduled for regular followup.   Dr. Aldana was present in office building while I examined patient

## 2024-04-03 NOTE — PHYSICAL EXAM
[No Acute Distress] : no acute distress [No Respiratory Distress] : no respiratory distress  [Clear to Auscultation] : lungs were clear to auscultation bilaterally [Regular Rhythm] : with a regular rhythm [Normal Rate] : normal rate  [Normal Affect] : the affect was normal [Normal Mood] : the mood was normal [EOMI] : extraocular movements intact [PERRL] : pupils equal round and reactive to light [Normal Outer Ear/Nose] : the outer ears and nose were normal in appearance [Normal TMs] : both tympanic membranes were normal [Normal Nasal Mucosa] : the nasal mucosa was normal [Supple] : supple [de-identified] : + Scleral erythema bilateral with discharge noted [de-identified] : +PND [de-identified] : + Maxillary pressure with poor illumination of sinuses

## 2024-04-03 NOTE — HISTORY OF PRESENT ILLNESS
[FreeTextEntry8] : Patient presents status post clinic evaluation.  Patient went to the clinic on 4/1/2024 and diagnosed with URI/sinus pressure and treated with prednisone 20 mg taper/Bromfed cough syrup.  Patient was not tested for COVID, no antibiotic was given.  Patient continues with sinus pressure and states she woke up with red eyes, a.m. crusting with copious discharge.  Patient does not wear contacts, her eyes feel sore, no fever.

## 2024-04-04 ENCOUNTER — TRANSCRIPTION ENCOUNTER (OUTPATIENT)
Age: 34
End: 2024-04-04

## 2024-05-13 ENCOUNTER — NON-APPOINTMENT (OUTPATIENT)
Age: 34
End: 2024-05-13

## 2024-05-13 ENCOUNTER — APPOINTMENT (OUTPATIENT)
Dept: INTERNAL MEDICINE | Facility: CLINIC | Age: 34
End: 2024-05-13
Payer: MEDICAID

## 2024-05-13 VITALS
HEART RATE: 80 BPM | HEIGHT: 68 IN | WEIGHT: 223 LBS | RESPIRATION RATE: 16 BRPM | SYSTOLIC BLOOD PRESSURE: 116 MMHG | OXYGEN SATURATION: 96 % | DIASTOLIC BLOOD PRESSURE: 70 MMHG | BODY MASS INDEX: 33.8 KG/M2

## 2024-05-13 DIAGNOSIS — F17.200 NICOTINE DEPENDENCE, UNSPECIFIED, UNCOMPLICATED: ICD-10-CM

## 2024-05-13 DIAGNOSIS — Q76.6 OTHER CONGENITAL MALFORMATIONS OF RIBS: ICD-10-CM

## 2024-05-13 DIAGNOSIS — Z23 ENCOUNTER FOR IMMUNIZATION: ICD-10-CM

## 2024-05-13 DIAGNOSIS — I49.3 VENTRICULAR PREMATURE DEPOLARIZATION: ICD-10-CM

## 2024-05-13 DIAGNOSIS — Z00.00 ENCOUNTER FOR GENERAL ADULT MEDICAL EXAMINATION W/OUT ABNORMAL FINDINGS: ICD-10-CM

## 2024-05-13 DIAGNOSIS — Z13.1 ENCOUNTER FOR SCREENING FOR DIABETES MELLITUS: ICD-10-CM

## 2024-05-13 DIAGNOSIS — E66.01 MORBID (SEVERE) OBESITY DUE TO EXCESS CALORIES: ICD-10-CM

## 2024-05-13 DIAGNOSIS — E55.9 VITAMIN D DEFICIENCY, UNSPECIFIED: ICD-10-CM

## 2024-05-13 PROCEDURE — 99395 PREV VISIT EST AGE 18-39: CPT | Mod: 25

## 2024-05-13 PROCEDURE — 99406 BEHAV CHNG SMOKING 3-10 MIN: CPT

## 2024-05-13 PROCEDURE — 93000 ELECTROCARDIOGRAM COMPLETE: CPT

## 2024-05-13 RX ORDER — AMOXICILLIN AND CLAVULANATE POTASSIUM 875; 125 MG/1; MG/1
875-125 TABLET, COATED ORAL
Qty: 20 | Refills: 0 | Status: DISCONTINUED | COMMUNITY
Start: 2024-04-03 | End: 2024-05-13

## 2024-05-13 RX ORDER — CYCLOBENZAPRINE HYDROCHLORIDE 10 MG/1
10 TABLET, FILM COATED ORAL
Qty: 30 | Refills: 0 | Status: DISCONTINUED | COMMUNITY
Start: 2023-11-16 | End: 2024-05-13

## 2024-05-13 RX ORDER — METFORMIN ER 500 MG 500 MG/1
500 TABLET ORAL
Qty: 90 | Refills: 0 | Status: DISCONTINUED | COMMUNITY
Start: 2023-05-03 | End: 2024-05-13

## 2024-05-13 RX ORDER — OMEPRAZOLE 20 MG/1
20 CAPSULE, DELAYED RELEASE ORAL DAILY
Qty: 30 | Refills: 5 | Status: DISCONTINUED | COMMUNITY
Start: 2023-12-07 | End: 2024-05-13

## 2024-05-13 RX ORDER — TOBRAMYCIN AND DEXAMETHASONE 3; 1 MG/ML; MG/ML
0.3-0.1 SUSPENSION/ DROPS OPHTHALMIC EVERY 4 HOURS
Qty: 1 | Refills: 0 | Status: DISCONTINUED | COMMUNITY
Start: 2024-04-03 | End: 2024-05-13

## 2024-05-13 NOTE — REVIEW OF SYSTEMS
[Fatigue] : fatigue [Recent Change In Weight] : ~T recent weight change [Palpitations] : palpitations [Negative] : Heme/Lymph [Chest Pain] : no chest pain [Lower Ext Edema] : no lower extremity edema [de-identified] : Acne

## 2024-05-13 NOTE — PHYSICAL EXAM
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
## 2024-05-13 NOTE — COUNSELING
[Potential consequences of obesity discussed] : Potential consequences of obesity discussed [Benefits of weight loss discussed] : Benefits of weight loss discussed [Encouraged to maintain food diary] : Encouraged to maintain food diary [Structured Weight Management Program suggested:] : Structured weight management program suggested [Encouraged to increase physical activity] : Encouraged to increase physical activity [Healthy eating counseling provided] : healthy eating [Smoking cessation counseling provided] : smoking cessation [Low Fat Diet] : Low fat diet [Decrease Portions] : Decrease food portions [Walking] : Walking [None] : None [Needs reinforcement, provided] : Patient needs reinforcement on understanding lifestyle changes and  the steps needed to achieve self management goals and reinforcement was provided [Use of nicotine replacement therapies and other medications discussed] : Use of nicotine replacement therapies and other medications discussed [Encouraged to pick a quit date and identify support needed to quit] : Encouraged to pick a quit date and identify support needed to quit [Yes] : Willing to quit smoking [FreeTextEntry1] : 5

## 2024-05-13 NOTE — HEALTH RISK ASSESSMENT
[Very Good] : ~his/her~  mood as very good [Yes] : Yes [Monthly or less (1 pt)] : Monthly or less (1 point) [1 or 2 (0 pts)] : 1 or 2 (0 points) [Never (0 pts)] : Never (0 points) [No] : In the past 12 months have you used drugs other than those required for medical reasons? No [No falls in past year] : Patient reported no falls in the past year [0] : 2) Feeling down, depressed, or hopeless: Not at all (0) [PHQ-2 Negative - No further assessment needed] : PHQ-2 Negative - No further assessment needed [Patient reported PAP Smear was normal] : Patient reported PAP Smear was normal [None] : None [With Significant Other] : lives with significant other [With Family] : lives with family [# of Members in Household ___] :  household currently consist of [unfilled] member(s) [Employed] : employed [College] : College [Single] : single [# Of Children ___] : has [unfilled] children [Sexually Active] : sexually active [Feels Safe at Home] : Feels safe at home [Fully functional (bathing, dressing, toileting, transferring, walking, feeding)] : Fully functional (bathing, dressing, toileting, transferring, walking, feeding) [Fully functional (using the telephone, shopping, preparing meals, housekeeping, doing laundry, using] : Fully functional and needs no help or supervision to perform IADLs (using the telephone, shopping, preparing meals, housekeeping, doing laundry, using transportation, managing medications and managing finances) [Smoke Detector] : smoke detector [Carbon Monoxide Detector] : carbon monoxide detector [Seat Belt] :  uses seat belt [Sunscreen] : uses sunscreen [Reviewed no changes] : Reviewed, no changes [Current] : Current [Discussed at today's visit] : Advance Directives Discussed at today's visit [Designated Healthcare Proxy] : Designated healthcare proxy [Name: ___] : Health Care Proxy's Name: [unfilled]  [Good] : ~his/her~  mood as  good [Audit-CScore] : 1 [de-identified] : no exercise [de-identified] : improved [[id number]] : 0 [Change in mental status noted] : No change in mental status noted [Reports changes in hearing] : Reports no changes in hearing [Reports changes in vision] : Reports no changes in vision [Reports changes in dental health] : Reports no changes in dental health [PapSmearDate] : 10/23 [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

## 2024-05-13 NOTE — ASSESSMENT
[FreeTextEntry1] : 33-year-old female with history of hyperthyroidism secondary to Graves' disease status post total thyroidectomy May 2019 and now with postsurgical hypothyroidism on levothyroxine. Recent blood work by endocrinology shows the patient still hypothyroid on levothyroxine.  Endocrinology also feels likely PCOS with having tried patient on metformin without benefit therefore discontinued.  Patient with history of depression but currently seems good and she feels she's good in this way. Still some reactive depression in response to her obesity with patient having lost additional weight now on Mounjaro via a nurse practitioner with 20 pounds of weight loss since starting it about 4 months ago and a total weight loss of about 90 pounds.  Patient also continues to be an every day smoker with 8 cigarettes/week Again long discussion about need to quit and risks to her general health over time. Discussed nicotine replacement feeling nicotine patch is not indicated for patient secondary to "not smoking enough" therefore recommended Nicorette gum.  Also recommended setting a date.  All questions answered.  Lower chest wall pain with prominent anterior inferior ribs likely floating ribs therefore x-ray ordered  Status post fall Feb 2021 with avulsion fracture of the right knee medial patellofemoral ligament.  Improved with physical therapy  Tdap December 2019  Patient received a J&J COVID vaccine x 1 and encouraged to get the booster  GYN follow up as scheduled Colonoscopy January 2024 with polyps with follow-up in 7 years per GI  Followup with specialists as scheduled Venipuncture done today in the office Followup yearly and as needed

## 2024-05-14 ENCOUNTER — TRANSCRIPTION ENCOUNTER (OUTPATIENT)
Age: 34
End: 2024-05-14

## 2024-05-14 LAB
25(OH)D3 SERPL-MCNC: 36.4 NG/ML
ALBUMIN SERPL ELPH-MCNC: 4.7 G/DL
ALP BLD-CCNC: 55 U/L
ALT SERPL-CCNC: 12 U/L
ANION GAP SERPL CALC-SCNC: 10 MMOL/L
APPEARANCE: CLEAR
AST SERPL-CCNC: 14 U/L
BACTERIA: NEGATIVE /HPF
BASOPHILS # BLD AUTO: 0.04 K/UL
BASOPHILS NFR BLD AUTO: 0.5 %
BILIRUB SERPL-MCNC: 0.2 MG/DL
BILIRUBIN URINE: NEGATIVE
BLOOD URINE: NEGATIVE
BUN SERPL-MCNC: 13 MG/DL
CALCIUM SERPL-MCNC: 9.4 MG/DL
CAST: 0 /LPF
CHLORIDE SERPL-SCNC: 105 MMOL/L
CHOLEST SERPL-MCNC: 150 MG/DL
CO2 SERPL-SCNC: 25 MMOL/L
COLOR: YELLOW
CREAT SERPL-MCNC: 0.73 MG/DL
EGFR: 113 ML/MIN/1.73M2
EOSINOPHIL # BLD AUTO: 0.15 K/UL
EOSINOPHIL NFR BLD AUTO: 1.8 %
EPITHELIAL CELLS: 5 /HPF
ESTIMATED AVERAGE GLUCOSE: 103 MG/DL
GLUCOSE QUALITATIVE U: NEGATIVE MG/DL
GLUCOSE SERPL-MCNC: 91 MG/DL
HBA1C MFR BLD HPLC: 5.2 %
HCT VFR BLD CALC: 41.1 %
HDLC SERPL-MCNC: 55 MG/DL
HGB BLD-MCNC: 14 G/DL
IMM GRANULOCYTES NFR BLD AUTO: 0.2 %
KETONES URINE: NEGATIVE MG/DL
LDLC SERPL CALC-MCNC: 83 MG/DL
LEUKOCYTE ESTERASE URINE: NEGATIVE
LYMPHOCYTES # BLD AUTO: 2.98 K/UL
LYMPHOCYTES NFR BLD AUTO: 35.6 %
MAN DIFF?: NORMAL
MCHC RBC-ENTMCNC: 31 PG
MCHC RBC-ENTMCNC: 34.1 GM/DL
MCV RBC AUTO: 90.9 FL
MICROSCOPIC-UA: NORMAL
MONOCYTES # BLD AUTO: 0.51 K/UL
MONOCYTES NFR BLD AUTO: 6.1 %
NEUTROPHILS # BLD AUTO: 4.66 K/UL
NEUTROPHILS NFR BLD AUTO: 55.8 %
NITRITE URINE: NEGATIVE
NONHDLC SERPL-MCNC: 95 MG/DL
PH URINE: 5.5
PLATELET # BLD AUTO: 261 K/UL
POTASSIUM SERPL-SCNC: 4.8 MMOL/L
PROT SERPL-MCNC: 7.5 G/DL
PROTEIN URINE: NEGATIVE MG/DL
RBC # BLD: 4.52 M/UL
RBC # FLD: 12.9 %
RED BLOOD CELLS URINE: 1 /HPF
SODIUM SERPL-SCNC: 140 MMOL/L
SPECIFIC GRAVITY URINE: 1.02
T4 FREE SERPL-MCNC: 1.6 NG/DL
TRIGL SERPL-MCNC: 54 MG/DL
TSH SERPL-ACNC: 0.21 UIU/ML
UROBILINOGEN URINE: 0.2 MG/DL
WBC # FLD AUTO: 8.36 K/UL
WHITE BLOOD CELLS URINE: 0 /HPF

## 2024-05-15 ENCOUNTER — APPOINTMENT (OUTPATIENT)
Dept: ENDOCRINOLOGY | Facility: CLINIC | Age: 34
End: 2024-05-15
Payer: MEDICAID

## 2024-05-15 VITALS
HEIGHT: 68 IN | HEART RATE: 79 BPM | BODY MASS INDEX: 33.8 KG/M2 | DIASTOLIC BLOOD PRESSURE: 68 MMHG | WEIGHT: 223 LBS | OXYGEN SATURATION: 95 % | SYSTOLIC BLOOD PRESSURE: 120 MMHG

## 2024-05-15 DIAGNOSIS — E89.0 POSTPROCEDURAL HYPOTHYROIDISM: ICD-10-CM

## 2024-05-15 DIAGNOSIS — E28.2 POLYCYSTIC OVARIAN SYNDROME: ICD-10-CM

## 2024-05-15 DIAGNOSIS — E83.51 HYPOCALCEMIA: ICD-10-CM

## 2024-05-15 PROCEDURE — G2211 COMPLEX E/M VISIT ADD ON: CPT | Mod: NC,1L

## 2024-05-15 PROCEDURE — 99214 OFFICE O/P EST MOD 30 MIN: CPT

## 2024-05-15 RX ORDER — LEVOTHYROXINE SODIUM 0.15 MG/1
150 TABLET ORAL
Qty: 90 | Refills: 0 | Status: DISCONTINUED | COMMUNITY
Start: 2022-07-11 | End: 2024-05-15

## 2024-05-15 RX ORDER — LEVOTHYROXINE SODIUM 0.14 MG/1
137 TABLET ORAL
Qty: 90 | Refills: 1 | Status: ACTIVE | COMMUNITY
Start: 2024-05-15 | End: 1900-01-01

## 2024-05-15 NOTE — PHYSICAL EXAM
[Alert] : alert [Well Nourished] : well nourished [No Acute Distress] : no acute distress [Well Developed] : well developed [Normal Sclera/Conjunctiva] : normal sclera/conjunctiva [EOMI] : extra ocular movement intact [No Proptosis] : no proptosis [Well Healed Scar] : well healed scar [No Respiratory Distress] : no respiratory distress [No Accessory Muscle Use] : no accessory muscle use [Clear to Auscultation] : lungs were clear to auscultation bilaterally [Normal S1, S2] : normal S1 and S2 [Normal Rate] : heart rate was normal [Regular Rhythm] : with a regular rhythm [No Edema] : no peripheral edema [Normal Anterior Cervical Nodes] : no anterior cervical lymphadenopathy [Acne] : acne present [Oriented x3] : oriented to person, place, and time [Normal Affect] : the affect was normal [Normal Mood] : the mood was normal [Acanthosis Nigricans] : no acanthosis nigricans

## 2024-05-15 NOTE — HISTORY OF PRESENT ILLNESS
[FreeTextEntry1] : INTERVAL HISTORY: constipation, BRBPR. Had colonoscopy- two polyps and a hemorrhoid.  Post surgical hypothyroidism s/p total thyroidectomy May 2019 for Grave's disease Current regimen: LT4 150 mcg daily, takes appropriately.  Saw eye doctor 2 years ago, denies YUNG.  Post surgical hypoparathyroidism with symptomatic hypocalcemia Current regimen: Calcitriol 0.5 mcg BID, Vit D 50,000 IU once weekly  Denies anterior neck pain and dysphagia. =============================== ? PCOS- GYN says she does not have PCOS but was started on Metformin about 1.5 year ago by Dr Puri due to acne, irregular menstrual cycles, and weight gain/difficulty losing weight. Stopped Metformin 6 months ago. Started Mounjaro 4 months ago. Has lost 34 pounds since then. Currently on 7.5 mg weekly.  Acne: improved, flares up with menses Hirsutism: on chin and neck, stable Some hair thinning Menses: regular LMP: last week Weight: lost 34 pound sin the past 4 monhts on Mounjaro  Diet: decreased appetite on Mounjaro, trying to eat healthy B- black coffee with egg platter (egg whites and veggies), skips lunch, D- salads or grilled chicken and rice. Snacks on cookies baked by sister. Doesn't like sweets much or chips. Does not drink soda. Exercise: strength training, walking  Denies history of GDM (son born in 2015, weighed 7 pounds 14 ounces at birth at 39 weeks) FH: T2DM in father. Denies history of pancreatitis and family history of thyroid cancer. Tobacco: down to 1/2 pack per day, has been cutting back with goal to quit.

## 2024-05-15 NOTE — ASSESSMENT
[FreeTextEntry1] : 33 year old female with hypothyroidism s/p total thyroidectomy for Grave's disease in May 2019. She had post surgical hypoparathyroidism. Possible PCOS as well.  1. Post surgical hypothyroidism- TSH 0.21, likely due to weight loss. -Decrease LT4 to 137 mcg daily. -Repeat TFTs in 2 months.  2. Post surgical hypoparathyroidism -Continue calcitriol and vitamin D 50,000 IU weekly. -Repeat calcium and PTH with next labs.  3. ? PCOS- GYN says patient does not have PCOS, but has h/o irregular menstrual cycle and signs of androgen excess (acne), as well as weight gain. -For hirsutism/acne: minimal symptoms. Can try spironolactone for hirsutism in the future if needed. Explained need for contraception if on spironolactone. Reports acne has improved. Can consider OCPs. -For menstrual regularity: currently reports regular menses. Can consider OCPs. -For weight/metabolic issues: Continue Mounjaro. Repeat A1c with next labs. -Advised low fat, low carb diet. -Continue with exercise. -Can check testosterone level with next labs.

## 2024-05-15 NOTE — REVIEW OF SYSTEMS
[Fatigue] : fatigue [Recent Weight Loss (___ Lbs)] : recent weight loss: [unfilled] lbs [Palpitations] : palpitations [Tremors] : tremors [Depression] : depression [Cold Intolerance] : cold intolerance [Eye Pain] : no pain [Dysphagia] : no dysphagia [Neck Pain] : no neck pain [Chest Pain] : no chest pain [Shortness Of Breath] : no shortness of breath [Nausea] : no nausea [Constipation] : no constipation [Abdominal Pain] : no abdominal pain [Vomiting] : no vomiting [Diarrhea] : no diarrhea [Irregular Menses] : regular menses [Anxiety] : no anxiety [Heat Intolerance] : no heat intolerance

## 2024-05-16 ENCOUNTER — RX RENEWAL (OUTPATIENT)
Age: 34
End: 2024-05-16

## 2024-05-16 RX ORDER — ERGOCALCIFEROL 1.25 MG/1
1.25 MG CAPSULE, LIQUID FILLED ORAL
Qty: 12 | Refills: 0 | Status: ACTIVE | COMMUNITY
Start: 2024-05-16 | End: 1900-01-01

## 2024-05-17 RX ORDER — ERGOCALCIFEROL 1.25 MG/1
1.25 MG CAPSULE, LIQUID FILLED ORAL
Qty: 12 | Refills: 0 | Status: ACTIVE | COMMUNITY
Start: 2019-03-06 | End: 1900-01-01

## 2024-07-26 ENCOUNTER — APPOINTMENT (OUTPATIENT)
Dept: ENDOCRINOLOGY | Facility: CLINIC | Age: 34
End: 2024-07-26
Payer: MEDICAID

## 2024-07-26 VITALS
DIASTOLIC BLOOD PRESSURE: 70 MMHG | SYSTOLIC BLOOD PRESSURE: 110 MMHG | HEIGHT: 68 IN | WEIGHT: 208 LBS | HEART RATE: 80 BPM | BODY MASS INDEX: 31.52 KG/M2 | OXYGEN SATURATION: 98 %

## 2024-07-26 DIAGNOSIS — E89.0 POSTPROCEDURAL HYPOTHYROIDISM: ICD-10-CM

## 2024-07-26 DIAGNOSIS — E55.9 VITAMIN D DEFICIENCY, UNSPECIFIED: ICD-10-CM

## 2024-07-26 DIAGNOSIS — E20.89 OTHER SPECIFIED HYPOPARATHYROIDISM: ICD-10-CM

## 2024-07-26 DIAGNOSIS — E83.51 HYPOCALCEMIA: ICD-10-CM

## 2024-07-26 PROCEDURE — 99401 PREV MED CNSL INDIV APPRX 15: CPT

## 2024-07-26 PROCEDURE — G2211 COMPLEX E/M VISIT ADD ON: CPT | Mod: NC

## 2024-07-26 PROCEDURE — 99214 OFFICE O/P EST MOD 30 MIN: CPT | Mod: 25

## 2024-07-26 PROCEDURE — G0447 BEHAVIOR COUNSEL OBESITY 15M: CPT | Mod: 59

## 2024-07-29 NOTE — PHYSICAL EXAM
[Alert] : alert [Obese] : obese [No Acute Distress] : no acute distress [Well Developed] : well developed [Normal Voice/Communication] : normal voice communication [PERRL] : pupils equal, round and reactive to light [Normal Hearing] : hearing was normal [No Respiratory Distress] : no respiratory distress [Clear to Auscultation] : lungs were clear to auscultation bilaterally [Normal Rate] : heart rate was normal [Regular Rhythm] : with a regular rhythm [No Edema] : no peripheral edema [Normal Bowel Sounds] : normal bowel sounds [Soft] : abdomen soft [Normal Supraclavicular Nodes] : no supraclavicular lymphadenopathy [Normal Anterior Cervical Nodes] : no anterior cervical lymphadenopathy [Normal Posterior Cervical Nodes] : no posterior cervical lymphadenopathy [No Stigmata of Cushings Syndrome] : no stigmata of Cushings Syndrome [No Clubbing, Cyanosis] : no clubbing  or cyanosis of the fingernails [Oriented x3] : oriented to person, place, and time [Normal Affect] : the affect was normal [Normal Insight/Judgement] : insight and judgment were intact [Normal Mood] : the mood was normal [de-identified] : Anterior neck surgical scar noted. No thyroid tissue palpated in the thyroid bed.  [de-identified] : Mild acne [de-identified] : B/l fine hand tremors.

## 2024-07-29 NOTE — PHYSICAL EXAM
[Alert] : alert [Obese] : obese [No Acute Distress] : no acute distress [Well Developed] : well developed [Normal Voice/Communication] : normal voice communication [PERRL] : pupils equal, round and reactive to light [Normal Hearing] : hearing was normal [No Respiratory Distress] : no respiratory distress [Clear to Auscultation] : lungs were clear to auscultation bilaterally [Normal Rate] : heart rate was normal [Regular Rhythm] : with a regular rhythm [No Edema] : no peripheral edema [Normal Bowel Sounds] : normal bowel sounds [Soft] : abdomen soft [Normal Supraclavicular Nodes] : no supraclavicular lymphadenopathy [Normal Anterior Cervical Nodes] : no anterior cervical lymphadenopathy [Normal Posterior Cervical Nodes] : no posterior cervical lymphadenopathy [No Stigmata of Cushings Syndrome] : no stigmata of Cushings Syndrome [No Clubbing, Cyanosis] : no clubbing  or cyanosis of the fingernails [Oriented x3] : oriented to person, place, and time [Normal Affect] : the affect was normal [Normal Insight/Judgement] : insight and judgment were intact [Normal Mood] : the mood was normal [de-identified] : Anterior neck surgical scar noted. No thyroid tissue palpated in the thyroid bed.  [de-identified] : Mild acne [de-identified] : B/l fine hand tremors.

## 2024-07-29 NOTE — ASSESSMENT
[FreeTextEntry1] : 33 y.o. Female follows for Hypothyroidism and PCOS. Previous patient of Dr. Puri. Has been seen by NP.   # Post surgical Hypothyroidism S/p TT in 2019 for Graves' disease Levothyroxine 137 mcg daily (decreased from 150 mcg daily last visit for TSH of 0.21). Fine hand tremor noted.  Just did labs today. F/u results. Will call for adjustments.  ADDENDUM: Repeat TFTs - WNL. Continue current dose of LT4  # Post surgical Hypoparathyroidism Hx of Hypocalcemia  Her Ca level has been appropriately low normal.  Patient just did labs - pending results. Will call with recommendations ADDENDUM: Repeat BW shows serum Ca 8.8 corrected at goal (low normal range). Vit D25OH is 22, remains in insufficiency range, however Ca is adequate.  Continue current regimen with Calcitriol 0.5 mcg BID and Ergocalciferol 50K IU weekly  # Obesity Unclear Hx of suspected PCOS. She has Hx of irregular peridots but clinically does not fit in criteria for PCOS. No significant Hirsutism, just few terminal hairs under the chin. Mild acne. Biochemically NO Hyperandrogenism.  Was on MFN Currently on compounded Semaglutide, prescribed by outside NP Discussed that I am not suggesting compounded forms but patient states here sister and mother also using this and she wants to try it.  ADDENDUM: B12 level is high >2000. The compounded Semaglutide she takes is mixed with B12 (?dose). She will discuss this with her provider, consider stopping.    F/u with NP in 4 months F/u with me in 6 months.

## 2024-07-29 NOTE — HISTORY OF PRESENT ILLNESS
[FreeTextEntry1] : 33 y.o. Female follows for Hypothyroidism and PCOS. Previous patient of Dr. Puri. Has been seen by NP.

## 2024-08-02 ENCOUNTER — OUTPATIENT (OUTPATIENT)
Dept: OUTPATIENT SERVICES | Facility: HOSPITAL | Age: 34
LOS: 1 days | End: 2024-08-02
Payer: MEDICAID

## 2024-08-02 ENCOUNTER — APPOINTMENT (OUTPATIENT)
Dept: RADIOLOGY | Facility: CLINIC | Age: 34
End: 2024-08-02
Payer: MEDICAID

## 2024-08-02 DIAGNOSIS — Q76.6 OTHER CONGENITAL MALFORMATIONS OF RIBS: ICD-10-CM

## 2024-08-02 PROCEDURE — 71110 X-RAY EXAM RIBS BIL 3 VIEWS: CPT

## 2024-08-02 PROCEDURE — 71110 X-RAY EXAM RIBS BIL 3 VIEWS: CPT | Mod: 26

## 2024-09-06 ENCOUNTER — APPOINTMENT (OUTPATIENT)
Dept: DERMATOLOGY | Facility: CLINIC | Age: 34
End: 2024-09-06
Payer: MEDICAID

## 2024-09-06 PROCEDURE — 99213 OFFICE O/P EST LOW 20 MIN: CPT | Mod: 25

## 2024-09-06 PROCEDURE — 11900 INJECT SKIN LESIONS </W 7: CPT

## 2024-09-06 PROCEDURE — 10060 I&D ABSCESS SIMPLE/SINGLE: CPT

## 2024-09-13 ENCOUNTER — APPOINTMENT (OUTPATIENT)
Dept: DERMATOLOGY | Facility: CLINIC | Age: 34
End: 2024-09-13

## 2024-09-13 PROCEDURE — 11900 INJECT SKIN LESIONS </W 7: CPT | Mod: 59,79

## 2024-09-13 PROCEDURE — 10060 I&D ABSCESS SIMPLE/SINGLE: CPT | Mod: 79

## 2024-09-13 PROCEDURE — 99213 OFFICE O/P EST LOW 20 MIN: CPT | Mod: 24,25

## 2024-10-04 ENCOUNTER — APPOINTMENT (OUTPATIENT)
Dept: DERMATOLOGY | Facility: CLINIC | Age: 34
End: 2024-10-04
Payer: MEDICAID

## 2024-10-04 PROCEDURE — 99214 OFFICE O/P EST MOD 30 MIN: CPT | Mod: 25

## 2024-10-04 PROCEDURE — 11900 INJECT SKIN LESIONS </W 7: CPT

## 2024-10-21 ENCOUNTER — RX RENEWAL (OUTPATIENT)
Age: 34
End: 2024-10-21

## 2024-10-22 ENCOUNTER — RX RENEWAL (OUTPATIENT)
Age: 34
End: 2024-10-22

## 2024-11-01 ENCOUNTER — RX RENEWAL (OUTPATIENT)
Age: 34
End: 2024-11-01

## 2024-11-01 ENCOUNTER — APPOINTMENT (OUTPATIENT)
Dept: DERMATOLOGY | Facility: CLINIC | Age: 34
End: 2024-11-01
Payer: MEDICAID

## 2024-11-01 PROCEDURE — 99213 OFFICE O/P EST LOW 20 MIN: CPT | Mod: 25

## 2024-11-01 PROCEDURE — 10060 I&D ABSCESS SIMPLE/SINGLE: CPT

## 2024-11-01 PROCEDURE — 11900 INJECT SKIN LESIONS </W 7: CPT | Mod: 59

## 2024-11-19 ENCOUNTER — APPOINTMENT (OUTPATIENT)
Dept: ENDOCRINOLOGY | Facility: CLINIC | Age: 34
End: 2024-11-19
Payer: MEDICAID

## 2024-11-19 VITALS
WEIGHT: 184 LBS | DIASTOLIC BLOOD PRESSURE: 72 MMHG | HEIGHT: 68 IN | BODY MASS INDEX: 27.89 KG/M2 | SYSTOLIC BLOOD PRESSURE: 118 MMHG

## 2024-11-19 DIAGNOSIS — E28.2 POLYCYSTIC OVARIAN SYNDROME: ICD-10-CM

## 2024-11-19 DIAGNOSIS — E55.9 VITAMIN D DEFICIENCY, UNSPECIFIED: ICD-10-CM

## 2024-11-19 DIAGNOSIS — E83.51 HYPOCALCEMIA: ICD-10-CM

## 2024-11-19 DIAGNOSIS — E04.9 NONTOXIC GOITER, UNSPECIFIED: ICD-10-CM

## 2024-11-19 DIAGNOSIS — E66.9 OBESITY, UNSPECIFIED: ICD-10-CM

## 2024-11-19 DIAGNOSIS — E20.89 OTHER SPECIFIED HYPOPARATHYROIDISM: ICD-10-CM

## 2024-11-19 DIAGNOSIS — E89.0 POSTPROCEDURAL HYPOTHYROIDISM: ICD-10-CM

## 2024-11-19 PROCEDURE — 99214 OFFICE O/P EST MOD 30 MIN: CPT

## 2024-11-19 PROCEDURE — G2211 COMPLEX E/M VISIT ADD ON: CPT | Mod: NC

## 2024-11-19 RX ORDER — SPIRONOLACTONE 100 MG/1
100 TABLET ORAL
Refills: 0 | Status: ACTIVE | COMMUNITY

## 2024-11-19 RX ORDER — TIRZEPATIDE 7.5 MG/.5ML
INJECTION, SOLUTION SUBCUTANEOUS
Refills: 0 | Status: ACTIVE | COMMUNITY

## 2024-12-03 ENCOUNTER — APPOINTMENT (OUTPATIENT)
Dept: DERMATOLOGY | Facility: CLINIC | Age: 34
End: 2024-12-03
Payer: MEDICAID

## 2024-12-03 PROCEDURE — 99214 OFFICE O/P EST MOD 30 MIN: CPT

## 2024-12-13 ENCOUNTER — APPOINTMENT (OUTPATIENT)
Dept: DERMATOLOGY | Facility: CLINIC | Age: 34
End: 2024-12-13
Payer: MEDICAID

## 2024-12-13 PROCEDURE — 99214 OFFICE O/P EST MOD 30 MIN: CPT | Mod: 25

## 2024-12-13 PROCEDURE — 11442 EXC FACE-MM B9+MARG 1.1-2 CM: CPT

## 2024-12-13 PROCEDURE — 12051 INTMD RPR FACE/MM 2.5 CM/<: CPT

## 2024-12-20 ENCOUNTER — APPOINTMENT (OUTPATIENT)
Dept: DERMATOLOGY | Facility: CLINIC | Age: 34
End: 2024-12-20
Payer: MEDICAID

## 2024-12-20 PROCEDURE — ZZZZZ: CPT

## 2025-01-10 ENCOUNTER — APPOINTMENT (OUTPATIENT)
Dept: INTERNAL MEDICINE | Facility: CLINIC | Age: 35
End: 2025-01-10
Payer: MEDICAID

## 2025-01-10 VITALS
DIASTOLIC BLOOD PRESSURE: 80 MMHG | SYSTOLIC BLOOD PRESSURE: 124 MMHG | OXYGEN SATURATION: 98 % | HEART RATE: 80 BPM | HEIGHT: 68 IN | RESPIRATION RATE: 13 BRPM | BODY MASS INDEX: 26.98 KG/M2 | WEIGHT: 178 LBS

## 2025-01-10 DIAGNOSIS — J06.9 ACUTE UPPER RESPIRATORY INFECTION, UNSPECIFIED: ICD-10-CM

## 2025-01-10 DIAGNOSIS — Z11.59 ENCOUNTER FOR SCREENING FOR OTHER VIRAL DISEASES: ICD-10-CM

## 2025-01-10 LAB — S PYO AG SPEC QL IA: NEGATIVE

## 2025-01-10 PROCEDURE — 99213 OFFICE O/P EST LOW 20 MIN: CPT | Mod: 25

## 2025-01-10 PROCEDURE — 87880 STREP A ASSAY W/OPTIC: CPT | Mod: QW

## 2025-01-10 RX ORDER — AMOXICILLIN AND CLAVULANATE POTASSIUM 875; 125 MG/1; MG/1
875-125 TABLET, COATED ORAL
Qty: 20 | Refills: 0 | Status: ACTIVE | COMMUNITY
Start: 2025-01-10 | End: 1900-01-01

## 2025-01-10 RX ORDER — ISOTRETINOIN 40 MG/1
40 CAPSULE, GELATIN COATED ORAL
Refills: 0 | Status: ACTIVE | COMMUNITY
Start: 2025-01-10

## 2025-01-13 ENCOUNTER — TRANSCRIPTION ENCOUNTER (OUTPATIENT)
Age: 35
End: 2025-01-13

## 2025-01-15 NOTE — PATIENT PROFILE ADULT - NSALCOHOLWITHDRAWAL_GEN_A_NUR
- elevation in bilirubin with concomitant elevations in LFTs  - obtaining RUQ US and fractionating for assessment     none

## 2025-01-24 ENCOUNTER — APPOINTMENT (OUTPATIENT)
Dept: DERMATOLOGY | Facility: CLINIC | Age: 35
End: 2025-01-24
Payer: MEDICAID

## 2025-01-24 PROCEDURE — 11900 INJECT SKIN LESIONS </W 7: CPT

## 2025-01-24 PROCEDURE — 99213 OFFICE O/P EST LOW 20 MIN: CPT | Mod: 25

## 2025-02-06 ENCOUNTER — APPOINTMENT (OUTPATIENT)
Dept: DERMATOLOGY | Facility: CLINIC | Age: 35
End: 2025-02-06
Payer: MEDICAID

## 2025-02-06 PROCEDURE — 99215 OFFICE O/P EST HI 40 MIN: CPT | Mod: 25

## 2025-02-06 PROCEDURE — 11901 INJECT SKIN LESIONS >7: CPT

## 2025-02-13 ENCOUNTER — APPOINTMENT (OUTPATIENT)
Dept: DERMATOLOGY | Facility: CLINIC | Age: 35
End: 2025-02-13
Payer: MEDICAID

## 2025-02-13 PROCEDURE — 99213 OFFICE O/P EST LOW 20 MIN: CPT

## 2025-03-12 ENCOUNTER — APPOINTMENT (OUTPATIENT)
Dept: DERMATOLOGY | Facility: CLINIC | Age: 35
End: 2025-03-12
Payer: MEDICAID

## 2025-03-12 PROCEDURE — 99213 OFFICE O/P EST LOW 20 MIN: CPT | Mod: 25

## 2025-03-12 PROCEDURE — 11900 INJECT SKIN LESIONS </W 7: CPT

## 2025-04-11 ENCOUNTER — APPOINTMENT (OUTPATIENT)
Dept: ENDOCRINOLOGY | Facility: CLINIC | Age: 35
End: 2025-04-11
Payer: MEDICAID

## 2025-04-11 VITALS
HEIGHT: 68 IN | BODY MASS INDEX: 24.71 KG/M2 | OXYGEN SATURATION: 99 % | WEIGHT: 163 LBS | SYSTOLIC BLOOD PRESSURE: 118 MMHG | DIASTOLIC BLOOD PRESSURE: 74 MMHG | HEART RATE: 83 BPM

## 2025-04-11 DIAGNOSIS — E89.0 POSTPROCEDURAL HYPOTHYROIDISM: ICD-10-CM

## 2025-04-11 DIAGNOSIS — E20.89 OTHER SPECIFIED HYPOPARATHYROIDISM: ICD-10-CM

## 2025-04-11 DIAGNOSIS — E55.9 VITAMIN D DEFICIENCY, UNSPECIFIED: ICD-10-CM

## 2025-04-11 DIAGNOSIS — Z86.39 PERSONAL HISTORY OF OTHER ENDOCRINE, NUTRITIONAL AND METABOLIC DISEASE: ICD-10-CM

## 2025-04-11 DIAGNOSIS — E28.2 POLYCYSTIC OVARIAN SYNDROME: ICD-10-CM

## 2025-04-11 PROCEDURE — G2211 COMPLEX E/M VISIT ADD ON: CPT | Mod: NC

## 2025-04-11 PROCEDURE — 99214 OFFICE O/P EST MOD 30 MIN: CPT

## 2025-04-14 ENCOUNTER — TRANSCRIPTION ENCOUNTER (OUTPATIENT)
Age: 35
End: 2025-04-14

## 2025-04-15 ENCOUNTER — APPOINTMENT (OUTPATIENT)
Dept: DERMATOLOGY | Facility: CLINIC | Age: 35
End: 2025-04-15
Payer: MEDICAID

## 2025-04-15 PROCEDURE — 99213 OFFICE O/P EST LOW 20 MIN: CPT

## 2025-04-21 ENCOUNTER — NON-APPOINTMENT (OUTPATIENT)
Age: 35
End: 2025-04-21

## 2025-05-09 ENCOUNTER — APPOINTMENT (OUTPATIENT)
Dept: DERMATOLOGY | Facility: CLINIC | Age: 35
End: 2025-05-09
Payer: MEDICAID

## 2025-05-09 PROCEDURE — 99213 OFFICE O/P EST LOW 20 MIN: CPT | Mod: 25

## 2025-05-09 PROCEDURE — 11900 INJECT SKIN LESIONS </W 7: CPT

## 2025-05-19 ENCOUNTER — APPOINTMENT (OUTPATIENT)
Dept: INTERNAL MEDICINE | Facility: CLINIC | Age: 35
End: 2025-05-19
Payer: MEDICAID

## 2025-05-19 ENCOUNTER — NON-APPOINTMENT (OUTPATIENT)
Age: 35
End: 2025-05-19

## 2025-05-19 VITALS
RESPIRATION RATE: 12 BRPM | WEIGHT: 156.25 LBS | BODY MASS INDEX: 23.68 KG/M2 | HEIGHT: 68 IN | HEART RATE: 95 BPM | DIASTOLIC BLOOD PRESSURE: 60 MMHG | OXYGEN SATURATION: 98 % | SYSTOLIC BLOOD PRESSURE: 108 MMHG

## 2025-05-19 DIAGNOSIS — E66.9 OBESITY, UNSPECIFIED: ICD-10-CM

## 2025-05-19 DIAGNOSIS — Z00.00 ENCOUNTER FOR GENERAL ADULT MEDICAL EXAMINATION W/OUT ABNORMAL FINDINGS: ICD-10-CM

## 2025-05-19 DIAGNOSIS — E89.0 POSTPROCEDURAL HYPOTHYROIDISM: ICD-10-CM

## 2025-05-19 DIAGNOSIS — F17.200 NICOTINE DEPENDENCE, UNSPECIFIED, UNCOMPLICATED: ICD-10-CM

## 2025-05-19 DIAGNOSIS — I49.3 VENTRICULAR PREMATURE DEPOLARIZATION: ICD-10-CM

## 2025-05-19 DIAGNOSIS — R00.0 TACHYCARDIA, UNSPECIFIED: ICD-10-CM

## 2025-05-19 DIAGNOSIS — Z86.39 PERSONAL HISTORY OF OTHER ENDOCRINE, NUTRITIONAL AND METABOLIC DISEASE: ICD-10-CM

## 2025-05-19 PROCEDURE — 99395 PREV VISIT EST AGE 18-39: CPT | Mod: 25

## 2025-05-19 PROCEDURE — 93000 ELECTROCARDIOGRAM COMPLETE: CPT

## 2025-05-20 LAB
APPEARANCE: CLEAR
BACTERIA: NEGATIVE /HPF
BILIRUBIN URINE: NEGATIVE
BLOOD URINE: NEGATIVE
CAST: 0 /LPF
COLOR: YELLOW
EPITHELIAL CELLS: 3 /HPF
GLUCOSE QUALITATIVE U: NEGATIVE MG/DL
KETONES URINE: NEGATIVE MG/DL
LEUKOCYTE ESTERASE URINE: NEGATIVE
MICROSCOPIC-UA: NORMAL
NITRITE URINE: NEGATIVE
PH URINE: 6
PROTEIN URINE: NEGATIVE MG/DL
RED BLOOD CELLS URINE: 0 /HPF
SPECIFIC GRAVITY URINE: 1.01
UROBILINOGEN URINE: 0.2 MG/DL
WHITE BLOOD CELLS URINE: 0 /HPF

## 2025-06-09 ENCOUNTER — APPOINTMENT (OUTPATIENT)
Dept: DERMATOLOGY | Facility: CLINIC | Age: 35
End: 2025-06-09

## 2025-06-15 ENCOUNTER — NON-APPOINTMENT (OUTPATIENT)
Age: 35
End: 2025-06-15

## 2025-06-16 ENCOUNTER — NON-APPOINTMENT (OUTPATIENT)
Age: 35
End: 2025-06-16

## 2025-06-16 ENCOUNTER — APPOINTMENT (OUTPATIENT)
Dept: DERMATOLOGY | Facility: CLINIC | Age: 35
End: 2025-06-16
Payer: MEDICAID

## 2025-06-16 PROCEDURE — 99213 OFFICE O/P EST LOW 20 MIN: CPT | Mod: 25

## 2025-06-16 PROCEDURE — 11900 INJECT SKIN LESIONS </W 7: CPT

## 2025-07-14 ENCOUNTER — APPOINTMENT (OUTPATIENT)
Dept: DERMATOLOGY | Facility: CLINIC | Age: 35
End: 2025-07-14
Payer: MEDICAID

## 2025-07-14 PROCEDURE — 99213 OFFICE O/P EST LOW 20 MIN: CPT | Mod: 25

## 2025-07-14 PROCEDURE — 11900 INJECT SKIN LESIONS </W 7: CPT

## 2025-07-29 ENCOUNTER — NON-APPOINTMENT (OUTPATIENT)
Age: 35
End: 2025-07-29

## 2025-07-29 ENCOUNTER — RX RENEWAL (OUTPATIENT)
Age: 35
End: 2025-07-29

## 2025-08-02 ENCOUNTER — NON-APPOINTMENT (OUTPATIENT)
Age: 35
End: 2025-08-02

## 2025-09-18 ENCOUNTER — APPOINTMENT (OUTPATIENT)
Dept: DERMATOLOGY | Facility: CLINIC | Age: 35
End: 2025-09-18
Payer: MEDICAID

## 2025-09-18 PROCEDURE — 11900 INJECT SKIN LESIONS </W 7: CPT

## 2025-09-18 PROCEDURE — 99214 OFFICE O/P EST MOD 30 MIN: CPT | Mod: 25

## (undated) DEVICE — POLY TRAP ETRAP

## (undated) DEVICE — SNARE CAPTIVATOR RND COLD STIFF 2.4X10MM 240CM

## (undated) DEVICE — KIT DEFENDO 4 OLY 4 PC